# Patient Record
Sex: FEMALE | Race: WHITE | Employment: OTHER | URBAN - METROPOLITAN AREA
[De-identification: names, ages, dates, MRNs, and addresses within clinical notes are randomized per-mention and may not be internally consistent; named-entity substitution may affect disease eponyms.]

---

## 2021-06-09 ENCOUNTER — OFFICE VISIT (OUTPATIENT)
Dept: INTERNAL MEDICINE CLINIC | Age: 81
End: 2021-06-09
Payer: MEDICARE

## 2021-06-09 VITALS
SYSTOLIC BLOOD PRESSURE: 136 MMHG | OXYGEN SATURATION: 98 % | BODY MASS INDEX: 27.43 KG/M2 | HEIGHT: 68 IN | WEIGHT: 181 LBS | HEART RATE: 81 BPM | DIASTOLIC BLOOD PRESSURE: 60 MMHG | RESPIRATION RATE: 14 BRPM

## 2021-06-09 DIAGNOSIS — E78.2 MIXED HYPERLIPIDEMIA: ICD-10-CM

## 2021-06-09 DIAGNOSIS — R32 URINARY INCONTINENCE, UNSPECIFIED TYPE: Primary | ICD-10-CM

## 2021-06-09 DIAGNOSIS — Z78.0 POSTMENOPAUSAL: ICD-10-CM

## 2021-06-09 DIAGNOSIS — Z87.448 HX OF RENAL FAILURE: ICD-10-CM

## 2021-06-09 DIAGNOSIS — Z13.820 SCREENING FOR OSTEOPOROSIS: ICD-10-CM

## 2021-06-09 DIAGNOSIS — E66.3 OVER WEIGHT: ICD-10-CM

## 2021-06-09 DIAGNOSIS — R73.9 HIGH BLOOD SUGAR: ICD-10-CM

## 2021-06-09 DIAGNOSIS — I10 ESSENTIAL HYPERTENSION: ICD-10-CM

## 2021-06-09 PROBLEM — I73.9 PAD (PERIPHERAL ARTERY DISEASE) (HCC): Status: ACTIVE | Noted: 2019-02-26

## 2021-06-09 PROBLEM — N19 RENAL FAILURE: Status: ACTIVE | Noted: 2017-08-04

## 2021-06-09 LAB
BILIRUBIN URINE: NEGATIVE
BLOOD, URINE: ABNORMAL
CLARITY: CLEAR
COLOR: YELLOW
EPITHELIAL CELLS, UA: 2 /HPF (ref 0–5)
GLUCOSE URINE: NEGATIVE MG/DL
HYALINE CASTS: 1 /LPF (ref 0–8)
KETONES, URINE: NEGATIVE MG/DL
LEUKOCYTE ESTERASE, URINE: NEGATIVE
MICROSCOPIC EXAMINATION: YES
NITRITE, URINE: NEGATIVE
PH UA: 6 (ref 5–8)
PROTEIN UA: 30 MG/DL
RBC UA: 8 /HPF (ref 0–4)
SPECIFIC GRAVITY UA: 1.02 (ref 1–1.03)
URINE TYPE: ABNORMAL
UROBILINOGEN, URINE: 0.2 E.U./DL
WBC UA: 4 /HPF (ref 0–5)

## 2021-06-09 PROCEDURE — 1123F ACP DISCUSS/DSCN MKR DOCD: CPT | Performed by: INTERNAL MEDICINE

## 2021-06-09 PROCEDURE — G8417 CALC BMI ABV UP PARAM F/U: HCPCS | Performed by: INTERNAL MEDICINE

## 2021-06-09 PROCEDURE — 1036F TOBACCO NON-USER: CPT | Performed by: INTERNAL MEDICINE

## 2021-06-09 PROCEDURE — G8400 PT W/DXA NO RESULTS DOC: HCPCS | Performed by: INTERNAL MEDICINE

## 2021-06-09 PROCEDURE — 99204 OFFICE O/P NEW MOD 45 MIN: CPT | Performed by: INTERNAL MEDICINE

## 2021-06-09 PROCEDURE — 4040F PNEUMOC VAC/ADMIN/RCVD: CPT | Performed by: INTERNAL MEDICINE

## 2021-06-09 PROCEDURE — G8427 DOCREV CUR MEDS BY ELIG CLIN: HCPCS | Performed by: INTERNAL MEDICINE

## 2021-06-09 PROCEDURE — 0509F URINE INCON PLAN DOCD: CPT | Performed by: INTERNAL MEDICINE

## 2021-06-09 PROCEDURE — 1090F PRES/ABSN URINE INCON ASSESS: CPT | Performed by: INTERNAL MEDICINE

## 2021-06-09 RX ORDER — DONEPEZIL HYDROCHLORIDE 5 MG/1
5 TABLET, FILM COATED ORAL NIGHTLY
COMMUNITY
End: 2021-06-23 | Stop reason: SDUPTHER

## 2021-06-09 RX ORDER — MIRTAZAPINE 15 MG/1
15 TABLET, FILM COATED ORAL NIGHTLY
COMMUNITY
End: 2021-08-30 | Stop reason: SDUPTHER

## 2021-06-09 RX ORDER — ATORVASTATIN CALCIUM 40 MG/1
40 TABLET, FILM COATED ORAL DAILY
COMMUNITY
End: 2021-08-16 | Stop reason: SDUPTHER

## 2021-06-09 RX ORDER — ASPIRIN 81 MG/1
81 TABLET ORAL NIGHTLY
COMMUNITY

## 2021-06-09 ASSESSMENT — PATIENT HEALTH QUESTIONNAIRE - PHQ9
SUM OF ALL RESPONSES TO PHQ QUESTIONS 1-9: 0
SUM OF ALL RESPONSES TO PHQ QUESTIONS 1-9: 0
1. LITTLE INTEREST OR PLEASURE IN DOING THINGS: 0
SUM OF ALL RESPONSES TO PHQ QUESTIONS 1-9: 0
2. FEELING DOWN, DEPRESSED OR HOPELESS: 0
SUM OF ALL RESPONSES TO PHQ9 QUESTIONS 1 & 2: 0

## 2021-06-09 NOTE — PROGRESS NOTES
SULFAMETHOXAZOLE-TRIMETHOPRIM: headaches - Converted from [employer]       Past Medical History:   Diagnosis Date    Anxiety     Dementia ([address]     Hyperlipidemia     Hypertension        No past surgical history on file. Social History     Socioeconomic History    Marital status:      Spouse name: Not on file    Number of children: Not on file    Years of education: Not on file    Highest education level: Not on file   Occupational History    Not on file   Tobacco Use    Smoking status: Never Smoker    Smokeless tobacco: Never Used   Substance and Sexual Activity    Alcohol use: Never    Drug use: Never    Sexual activity: Not on file   Other Topics Concern    Not on file   Social History Narrative    Not on file     Social Determinants of Health     Financial Resource Strain:     Difficulty of Paying Living Expenses:    Food Insecurity:     Worried About Running Out of Food in the Last Year:     [address] in the Last Year:    Transportation Needs:     Lack of Transportation (Medical):  Lack of Transportation (Non-Medical):    Physical Activity:     Days of Exercise per Week:     Minutes of Exercise per Session:    Stress:     Feeling of Stress :    Social Connections:     Frequency of Communication with Friends and Family:     Frequency of Social Gatherings with Friends and Family:     Attends Jainism Services:     Active Member of Clubs or Organizations:     Attends Club or Organization Meetings:     Marital Status:    Intimate Partner Violence:     Fear of Current or Ex-Partner:     Emotionally Abused:     Physically Abused:     Sexually Abused:         No family history on file. OBJECTIVE:    Vitals:    06/09/21 0922 06/09/21 1005   BP: (!) 148/60 136/60   Pulse: 81    Resp: 14    SpO2: 98%    Weight: 181 lb (82.1 kg)    Height: 5' 8\" (1.727 m)      Body mass index is 27.52 kg/m².       Physical Exam  Constitutional:       General: She is not in acute 1 month    - Urinalysis with Microscopic; Future  - Culture, Urine; Future  - Urinalysis with Microscopic  - Culture, Urine    2. Over weight  BMI 27. Will screen for BONNER and DM  - Lipid Panel; Future  - Comprehensive Metabolic Panel; Future  - Hemoglobin A1C; Future    3. Hx of renal failure  Patient with documented renal failure in the past. Will check CMP to assess current kidney function. - Comprehensive Metabolic Panel; Future    4. High blood sugar  Patient with high blood sugar level in the past without documented hx of DM. Will check hba1c  - Hemoglobin A1C; Future    5. Screening for osteoporosis  Post menopausal. Has had dexa in the past about 10 years ago was normal.  - screening dexa      - DEXA BONE DENSITY 2 SITES; Future    6. Postmenopausal  Per #5 above  - DEXA BONE DENSITY 2 SITES; Future    7. Essential hypertension  BP wnl on recheck in the office today. Continue off medications. Low salt diet (<2g sodium per day)    8. Mixed hyperlipidemia  On statin therapy. Will check lipid panel. Continue statin    Return in about 4 weeks (around 7/7/2021).      The DO Napoleon

## 2021-06-10 LAB — URINE CULTURE, ROUTINE: NORMAL

## 2021-06-11 ENCOUNTER — TELEPHONE (OUTPATIENT)
Dept: INTERNAL MEDICINE CLINIC | Age: 81
End: 2021-06-11

## 2021-06-16 DIAGNOSIS — R31.29 OTHER MICROSCOPIC HEMATURIA: Primary | ICD-10-CM

## 2021-06-23 DIAGNOSIS — R31.29 OTHER MICROSCOPIC HEMATURIA: Primary | ICD-10-CM

## 2021-06-23 DIAGNOSIS — F03.90 DEMENTIA WITHOUT BEHAVIORAL DISTURBANCE, UNSPECIFIED DEMENTIA TYPE: ICD-10-CM

## 2021-06-23 RX ORDER — DONEPEZIL HYDROCHLORIDE 5 MG/1
5 TABLET, FILM COATED ORAL NIGHTLY
Qty: 30 TABLET | Refills: 2 | Status: SHIPPED | OUTPATIENT
Start: 2021-06-23 | End: 2021-08-30 | Stop reason: SDUPTHER

## 2021-08-09 ENCOUNTER — OFFICE VISIT (OUTPATIENT)
Dept: INTERNAL MEDICINE CLINIC | Age: 81
End: 2021-08-09
Payer: MEDICARE

## 2021-08-09 VITALS
HEIGHT: 67 IN | SYSTOLIC BLOOD PRESSURE: 132 MMHG | BODY MASS INDEX: 28.72 KG/M2 | WEIGHT: 183 LBS | OXYGEN SATURATION: 98 % | DIASTOLIC BLOOD PRESSURE: 78 MMHG | RESPIRATION RATE: 16 BRPM | HEART RATE: 76 BPM

## 2021-08-09 DIAGNOSIS — R32 URINARY INCONTINENCE, UNSPECIFIED TYPE: ICD-10-CM

## 2021-08-09 DIAGNOSIS — R73.03 PREDIABETES: ICD-10-CM

## 2021-08-09 DIAGNOSIS — R31.0 GROSS HEMATURIA: ICD-10-CM

## 2021-08-09 DIAGNOSIS — I10 ESSENTIAL HYPERTENSION: Primary | ICD-10-CM

## 2021-08-09 DIAGNOSIS — I73.9 PAD (PERIPHERAL ARTERY DISEASE) (HCC): ICD-10-CM

## 2021-08-09 PROCEDURE — 4040F PNEUMOC VAC/ADMIN/RCVD: CPT | Performed by: INTERNAL MEDICINE

## 2021-08-09 PROCEDURE — 1036F TOBACCO NON-USER: CPT | Performed by: INTERNAL MEDICINE

## 2021-08-09 PROCEDURE — 1090F PRES/ABSN URINE INCON ASSESS: CPT | Performed by: INTERNAL MEDICINE

## 2021-08-09 PROCEDURE — 1123F ACP DISCUSS/DSCN MKR DOCD: CPT | Performed by: INTERNAL MEDICINE

## 2021-08-09 PROCEDURE — G8400 PT W/DXA NO RESULTS DOC: HCPCS | Performed by: INTERNAL MEDICINE

## 2021-08-09 PROCEDURE — G8427 DOCREV CUR MEDS BY ELIG CLIN: HCPCS | Performed by: INTERNAL MEDICINE

## 2021-08-09 PROCEDURE — G8417 CALC BMI ABV UP PARAM F/U: HCPCS | Performed by: INTERNAL MEDICINE

## 2021-08-09 PROCEDURE — 99214 OFFICE O/P EST MOD 30 MIN: CPT | Performed by: INTERNAL MEDICINE

## 2021-08-09 PROCEDURE — 0509F URINE INCON PLAN DOCD: CPT | Performed by: INTERNAL MEDICINE

## 2021-08-09 SDOH — ECONOMIC STABILITY: INCOME INSECURITY: IN THE LAST 12 MONTHS, WAS THERE A TIME WHEN YOU WERE NOT ABLE TO PAY THE MORTGAGE OR RENT ON TIME?: NO

## 2021-08-09 SDOH — ECONOMIC STABILITY: FOOD INSECURITY: WITHIN THE PAST 12 MONTHS, YOU WORRIED THAT YOUR FOOD WOULD RUN OUT BEFORE YOU GOT MONEY TO BUY MORE.: NEVER TRUE

## 2021-08-09 SDOH — ECONOMIC STABILITY: FOOD INSECURITY: WITHIN THE PAST 12 MONTHS, THE FOOD YOU BOUGHT JUST DIDN'T LAST AND YOU DIDN'T HAVE MONEY TO GET MORE.: NEVER TRUE

## 2021-08-09 SDOH — ECONOMIC STABILITY: TRANSPORTATION INSECURITY
IN THE PAST 12 MONTHS, HAS THE LACK OF TRANSPORTATION KEPT YOU FROM MEDICAL APPOINTMENTS OR FROM GETTING MEDICATIONS?: NO

## 2021-08-09 SDOH — ECONOMIC STABILITY: HOUSING INSECURITY
IN THE LAST 12 MONTHS, WAS THERE A TIME WHEN YOU DID NOT HAVE A STEADY PLACE TO SLEEP OR SLEPT IN A SHELTER (INCLUDING NOW)?: NO

## 2021-08-09 SDOH — ECONOMIC STABILITY: TRANSPORTATION INSECURITY
IN THE PAST 12 MONTHS, HAS LACK OF TRANSPORTATION KEPT YOU FROM MEETINGS, WORK, OR FROM GETTING THINGS NEEDED FOR DAILY LIVING?: NO

## 2021-08-09 ASSESSMENT — SOCIAL DETERMINANTS OF HEALTH (SDOH): HOW HARD IS IT FOR YOU TO PAY FOR THE VERY BASICS LIKE FOOD, HOUSING, MEDICAL CARE, AND HEATING?: NOT HARD AT ALL

## 2021-08-09 NOTE — PROGRESS NOTES
Spenser Villalobos (:  1940) is a [de-identified] y.o. female,Established patient, here for evaluation of the following chief complaint(s):  1 Month Follow-Up      Vitals:    21 1453   BP: 132/78   Pulse: 76   Resp: 16   SpO2: 98%        ASSESSMENT/PLAN:  1. Essential hypertension  Blood pressure well controlled on no medications at this time continue to monitor. 2. PAD (peripheral artery disease) ([facility] [address]  Documented history of PAD patient has no pain in the legs with walking. 3. Prediabetes  Prediabetes noted on prior lab work we will plan to follow-up labs in 4 months  4. Urinary incontinence, unspecified type  Patient seen by urology and started on medications for incontinence. Patient is unsure of the name of this medication. Patient's daughter supposed to get me the name of this medication as soon as possible. 5. Gross hematuria  Evaluated by the urology group will obtain these records. Plan for CT to evaluate for mass within the bladder. We will continue to follow urology's recommendations. 6. Dementia  Continue Aricept    Return in about 4 months (around 2021). SUBJECTIVE/OBJECTIVE:  HPI    Patient is an 52-year-old female with past medical history of hypertension, PAD, microscopic hematuria, dementia and CKD who is here for follow-up for microscopic hematuria and other chronic problems. She did see the urology group -for microscopic hematuria. They did urine studies and lab work at the visit. CT scan also ordered- looking for mass in the bladder. This is not completed as of yet. Is also given medication for the incontinance. Patient feeling well overall but she does note some knee pain over the last 2 weeks ago. Did have a fall 1 month ago. She fell onto the right hip but did not fall onto the right knee where the pain is present. She has been taking ibuprofen for the discomfort with some relief. Pain does come and go and is not consistent.     Review of Systems ROS negative except for those noted in the HPI above. Vitals:    08/09/21 1453   BP: 132/78   Pulse: 76   Resp: 16   SpO2: 98%         Physical Exam  Constitutional:       General: She is not in acute distress. Appearance: Normal appearance. She is normal weight. She is not ill-appearing. HENT:      Head: Normocephalic and atraumatic. Right Ear: There is no impacted cerumen. Left Ear: There is no impacted cerumen. Eyes:      General: No scleral icterus. Extraocular Movements: Extraocular movements intact. Conjunctiva/sclera: Conjunctivae normal.   Cardiovascular:      Rate and Rhythm: Normal rate and regular rhythm. Pulses: Normal pulses. Heart sounds: No murmur heard. No friction rub. No gallop. Pulmonary:      Effort: Pulmonary effort is normal. No respiratory distress. Breath sounds: Normal breath sounds. No wheezing, rhonchi or rales. Abdominal:      General: Bowel sounds are normal. There is no distension. Palpations: Abdomen is soft. Tenderness: There is no abdominal tenderness. There is no guarding or rebound. Musculoskeletal:         General: No swelling or deformity. Comments: Crepitus in the right knee with flexion and extension     Skin:     General: Skin is warm. Findings: No erythema or rash. Neurological:      General: No focal deficit present. Mental Status: She is alert and oriented to person, place, and time. Psychiatric:         Mood and Affect: Mood normal.         Behavior: Behavior normal.           An electronic signature was used to authenticate this note.     --Christa Cobos, DO

## 2021-08-10 PROBLEM — R73.03 PREDIABETES: Status: ACTIVE | Noted: 2021-06-09

## 2021-08-16 ENCOUNTER — PATIENT MESSAGE (OUTPATIENT)
Dept: INTERNAL MEDICINE CLINIC | Age: 81
End: 2021-08-16

## 2021-08-16 RX ORDER — ATORVASTATIN CALCIUM 40 MG/1
40 TABLET, FILM COATED ORAL DAILY
Qty: 30 TABLET | Refills: 3 | Status: SHIPPED | OUTPATIENT
Start: 2021-08-16 | End: 2022-02-16 | Stop reason: SDUPTHER

## 2021-08-16 NOTE — TELEPHONE ENCOUNTER
From: Dejah Gill  To: Castillo Fernandez DO  Sent: 2021 12:28 PM EDT  Subject: Prescription Question    My mom Rhoda Bey () 1940 needs her Atorvastatin sent into the [facility] [place] on [address]. Please call if you have any questions or issues with sending in the medication to the pharmacy.  Thank You Fallon Babcock [phone]

## 2021-08-30 ENCOUNTER — OFFICE VISIT (OUTPATIENT)
Dept: INTERNAL MEDICINE CLINIC | Age: 81
End: 2021-08-30
Payer: MEDICARE

## 2021-08-30 ENCOUNTER — TELEPHONE (OUTPATIENT)
Dept: INTERNAL MEDICINE CLINIC | Age: 81
End: 2021-08-30

## 2021-08-30 ENCOUNTER — HOSPITAL ENCOUNTER (OUTPATIENT)
Dept: NON INVASIVE DIAGNOSTICS | Age: 81
Discharge: HOME | End: 2021-08-30
Payer: MEDICARE

## 2021-08-30 VITALS
RESPIRATION RATE: 16 BRPM | SYSTOLIC BLOOD PRESSURE: 140 MMHG | HEIGHT: 67 IN | OXYGEN SATURATION: 98 % | WEIGHT: 184 LBS | DIASTOLIC BLOOD PRESSURE: 70 MMHG | BODY MASS INDEX: 28.88 KG/M2 | HEART RATE: 80 BPM

## 2021-08-30 DIAGNOSIS — R94.31 ABNORMAL EKG: ICD-10-CM

## 2021-08-30 DIAGNOSIS — I10 ESSENTIAL HYPERTENSION: ICD-10-CM

## 2021-08-30 DIAGNOSIS — R31.29 MICROSCOPIC HEMATURIA: ICD-10-CM

## 2021-08-30 DIAGNOSIS — F03.90 DEMENTIA WITHOUT BEHAVIORAL DISTURBANCE, UNSPECIFIED DEMENTIA TYPE: ICD-10-CM

## 2021-08-30 DIAGNOSIS — Z01.818 PREOP EXAMINATION: Primary | ICD-10-CM

## 2021-08-30 DIAGNOSIS — Z01.818 PREOP EXAMINATION: ICD-10-CM

## 2021-08-30 LAB
LV EF: 58 %
LVEF MODALITY: NORMAL

## 2021-08-30 PROCEDURE — 93306 TTE W/DOPPLER COMPLETE: CPT

## 2021-08-30 PROCEDURE — 1123F ACP DISCUSS/DSCN MKR DOCD: CPT | Performed by: INTERNAL MEDICINE

## 2021-08-30 PROCEDURE — G8417 CALC BMI ABV UP PARAM F/U: HCPCS | Performed by: INTERNAL MEDICINE

## 2021-08-30 PROCEDURE — G8400 PT W/DXA NO RESULTS DOC: HCPCS | Performed by: INTERNAL MEDICINE

## 2021-08-30 PROCEDURE — 1090F PRES/ABSN URINE INCON ASSESS: CPT | Performed by: INTERNAL MEDICINE

## 2021-08-30 PROCEDURE — 93000 ELECTROCARDIOGRAM COMPLETE: CPT | Performed by: INTERNAL MEDICINE

## 2021-08-30 PROCEDURE — 99214 OFFICE O/P EST MOD 30 MIN: CPT | Performed by: INTERNAL MEDICINE

## 2021-08-30 PROCEDURE — 1036F TOBACCO NON-USER: CPT | Performed by: INTERNAL MEDICINE

## 2021-08-30 PROCEDURE — 4040F PNEUMOC VAC/ADMIN/RCVD: CPT | Performed by: INTERNAL MEDICINE

## 2021-08-30 PROCEDURE — G8427 DOCREV CUR MEDS BY ELIG CLIN: HCPCS | Performed by: INTERNAL MEDICINE

## 2021-08-30 RX ORDER — DONEPEZIL HYDROCHLORIDE 5 MG/1
5 TABLET, FILM COATED ORAL NIGHTLY
Qty: 30 TABLET | Refills: 2 | Status: SHIPPED | OUTPATIENT
Start: 2021-08-30 | End: 2021-10-11

## 2021-08-30 RX ORDER — MIRTAZAPINE 15 MG/1
15 TABLET, FILM COATED ORAL NIGHTLY
Qty: 30 TABLET | Refills: 3 | Status: SHIPPED | OUTPATIENT
Start: 2021-08-30 | End: 2021-10-11

## 2021-08-30 NOTE — PROGRESS NOTES
[address] [facility]  Preoperative Evaluation  Tawnya Garrett DO  Internal Medicine      Leilanifouzia Jack  YOB: 1940    Date of Service:  8/30/2021    Chief Complaint   Patient presents with    Pre-op Exam     [facility] [phone]       HPI:    This patient presents tothe office today for a preoperative evaluation at the request of surgeon, Dr. Daina Cleaning, who plans on performing cysto on September 2 at the urology group surgery center in [facility]. The current problem began years ago, and symptoms have been stable. Planned anesthesia: General   Known anesthesia problems: unknown  Bleeding risk: No recent or remote history of abnormal bleeding  Personal or FH of DVT/PE: No   Recent steroid use: no  Exercise tolerance before surgery at least 4 METS (Can walk up a flight of steps or a hill or walk on level ground at 3 to 4 mph): Yes   Patient objection to receiving blood products: No    Patient is feeling very well. She denies any chest pain or shortness of breath when walking with her walker. She does go long distances with walking but walks slowly. Patient has had blood in the urine which is reason for procedure . Patient Active Problem List   Diagnosis    Anxiety disorder    HTN (hypertension)    Insomnia    Low back pain    Mixed hyperlipidemia    PAD (peripheral artery disease) [address] [address]    Renal failure    Urinary incontinence    Over weight    Hx of renal failure    Prediabetes    Microscopic hematuria       Review of Systems ROS negative except for those noted in the HPI above.        Allergies   Allergen Reactions    Clarithromycin      CLARITHROMYCIN: headache - Converted from [employer]    Sulfamethoxazole-Trimethoprim      SULFAMETHOXAZOLE-TRIMETHOPRIM: headaches - Converted from [employer]     Outpatient Medications Marked as Taking for the 8/30/21 encounter (Office Visit) with Gabriella Delgadillo DO   Medication Sig Dispense Refill    mirtazapine (Maggie Moraes) 15 MG tablet Take 1 tablet by mouth nightly 30 tablet 3    donepezil (ARICEPT) 5 MG tablet Take 1 tablet by mouth nightly 30 tablet 2    atorvastatin (LIPITOR) 40 MG tablet Take 1 tablet by mouth daily 30 tablet 3    aspirin 81 MG EC tablet Take 81 mg by mouth daily         Past Medical History:   Diagnosis Date    Anxiety     Dementia ([address]     Hyperlipidemia     Hypertension      No past surgical history on file. No family history on file. Social History     Socioeconomic History    Marital status:      Spouse name: Not on file    Number of children: Not on file    Years of education: Not on file    Highest education level: Not on file   Occupational History    Not on file   Tobacco Use    Smoking status: Never Smoker    Smokeless tobacco: Never Used   Substance and Sexual Activity    Alcohol use: Never    Drug use: Never    Sexual activity: Not on file   Other Topics Concern    Not on file   Social History Narrative    Not on file     Social Determinants of Health     Financial Resource Strain: Low Risk     Difficulty of Paying Living Expenses: Not hard at all   Food Insecurity: No Food Insecurity    Worried About Running Out of Food in the Last Year: Never true    [employer] of Food in the Last Year: Never true   Transportation Needs: No Transportation Needs    Lack of Transportation (Medical): No    Lack of Transportation (Non-Medical):  No   Physical Activity:     Days of Exercise per Week:     Minutes of Exercise per Session:    Stress:     Feeling of Stress :    Social Connections:     Frequency of Communication with Friends and Family:     Frequency of Social Gatherings with Friends and Family:     Attends Sikh Services:     Active Member of Clubs or Organizations:     Attends Club or Organization Meetings:     Marital Status:    Intimate Partner Violence:     Fear of Current or Ex-Partner:     Emotionally Abused:     Physically Abused:     Sexually Abused: Objective:    Vitals:    08/30/21 1130   BP: (!) 140/70   Pulse: 80   Resp: 16   SpO2: 98%   Weight: 184 lb (83.5 kg)   Height: 5' 7\" (1.702 m)       Wt Readings from Last 2 Encounters:   08/30/21 184 lb (83.5 kg)   08/09/21 183 lb (83 kg)     BP Readings from Last 3 Encounters:   08/30/21 (!) 140/70   08/09/21 132/78   06/09/21 136/60      Physical Exam  Constitutional:       General: She is not in acute distress. Appearance: Normal appearance. She is not ill-appearing. HENT:      Head: Normocephalic and atraumatic. Right Ear: External ear normal.      Left Ear: External ear normal.   Eyes:      General: No scleral icterus. Extraocular Movements: Extraocular movements intact. Conjunctiva/sclera: Conjunctivae normal.   Cardiovascular:      Rate and Rhythm: Normal rate and regular rhythm. Pulses: Normal pulses. Heart sounds: No murmur heard. No friction rub. No gallop. Pulmonary:      Effort: Pulmonary effort is normal. No respiratory distress. Breath sounds: Normal breath sounds. No wheezing, rhonchi or rales. Abdominal:      General: Bowel sounds are normal. There is no distension. Palpations: Abdomen is soft. There is no mass. Tenderness: There is no abdominal tenderness. There is no guarding or rebound. Musculoskeletal:         General: No swelling. Right lower leg: No edema. Left lower leg: No edema. Skin:     Findings: No erythema or rash. Neurological:      General: No focal deficit present. Mental Status: She is alert and oriented to person, place, and time.    Psychiatric:         Mood and Affect: Mood normal.         Behavior: Behavior normal.          Lab Results   Component Value Date    HDL 30 (L) 06/09/2021    LDLCALC 72 06/09/2021     Lab Results   Component Value Date    LABA1C 6.0 06/09/2021     Lab Results   Component Value Date     (L) 06/09/2021    K 4.6 06/09/2021    BUN 22 (H) 06/09/2021    CREATININE 1.0 06/09/2021    LABGLOM 53 (A) 06/09/2021    GFRAA >60 06/09/2021    CALCIUM 9.3 06/09/2021     Lab Results   Component Value Date    ALT 13 06/09/2021    AST 25 06/09/2021     No results found for: HGB  No results found for: TSHREFLEX, TSH          Assessment/Plan:     1. Preop examination  Patient low to intermediate risk for low risk procedure. Okay to proceed with surgery. EKG did show abnormalities including possible old anterior septal infarct? Although patient did have EKG in March 2021 I am not able to see this because it is in care everywhere from [place]. However, there is a read within care everywhere showing similar findings. Our EKG did also have low voltage, likely secondary to lead placement.   -Obtained echo to evaluate heart structure given these findings which came back normal.  Okay to proceed with surgery.  -Since patient does not have any chest pain on exertion or at rest will hold on stress test  - EKG 12 lead; Future  - EKG 12 lead  - ECHO Complete 2D W Doppler W Color; Future    2. Dementia without behavioral disturbance, unspecified dementia type ([facility] [address]  Continue current medications refill sent    3. Abnormal EKG  See #1 above  - ECHO Complete 2D W Doppler W Color; Future    4. Essential hypertension  Moderately well controlled. Blood pressure slightly up today with systolic at 580    5. Microscopic hematuria  Patient with microscopic hematuria. Plans for cystoscopy for further evaluation. Emergent procedure No  Active Cardiac Condition (decompensated HF, Arrhythmia, MI <3 weeks, severe valve disease):  No   Risk Level of Procedure Low Risk (endoscopy, superficial skin, breast, ambulatory, or cataract, etc.)  Revised Cardiac Risk Index Risk factors: History of ischemic heart disease (likely per EKG however unknow)     Patient is low - intermediate risk for major cardiac complications during a low risk procedure and may continue as planned.     Preoperative workup as follows: echocardiography to exclude impaired ventricular function, ECG  Change in medication regimen before surgery: Hold all medications on morning of surgery  Prophylaxis for cardiac events with perioperative beta-blockers:Not indicated    Deep vein thrombosis prophylaxis: regimen to be chosen by surgical team    Further recommendations requested from consultants: No        --Coreen Gutiérrez DO on 8/31/2021 at 11:45 AM

## 2021-08-30 NOTE — TELEPHONE ENCOUNTER
Please fax patient's Echo and H&P to:     [facility]  Fax #:  [phone]  Atten:  Peggy James normal...

## 2021-08-31 PROBLEM — R31.29 MICROSCOPIC HEMATURIA: Status: ACTIVE | Noted: 2021-08-31

## 2021-10-06 ENCOUNTER — TELEPHONE (OUTPATIENT)
Dept: INTERNAL MEDICINE CLINIC | Age: 81
End: 2021-10-06

## 2021-10-07 ENCOUNTER — OFFICE VISIT (OUTPATIENT)
Dept: INTERNAL MEDICINE CLINIC | Age: 81
End: 2021-10-07
Payer: MEDICARE

## 2021-10-07 VITALS
DIASTOLIC BLOOD PRESSURE: 66 MMHG | SYSTOLIC BLOOD PRESSURE: 138 MMHG | RESPIRATION RATE: 16 BRPM | TEMPERATURE: 98.1 F | BODY MASS INDEX: 28.66 KG/M2 | HEART RATE: 80 BPM | WEIGHT: 183 LBS

## 2021-10-07 DIAGNOSIS — R39.9 UTI SYMPTOMS: Primary | ICD-10-CM

## 2021-10-07 LAB
BILIRUBIN, POC: NEGATIVE
BLOOD URINE, POC: NORMAL
CLARITY, POC: CLEAR
COLOR, POC: YELLOW
GLUCOSE URINE, POC: NEGATIVE
KETONES, POC: NEGATIVE
LEUKOCYTE EST, POC: NORMAL
NITRITE, POC: NEGATIVE
PH, POC: 6
PROTEIN, POC: NORMAL
SPECIFIC GRAVITY, POC: 1.03
UROBILINOGEN, POC: NORMAL

## 2021-10-07 PROCEDURE — G8400 PT W/DXA NO RESULTS DOC: HCPCS | Performed by: INTERNAL MEDICINE

## 2021-10-07 PROCEDURE — 4040F PNEUMOC VAC/ADMIN/RCVD: CPT | Performed by: INTERNAL MEDICINE

## 2021-10-07 PROCEDURE — G8427 DOCREV CUR MEDS BY ELIG CLIN: HCPCS | Performed by: INTERNAL MEDICINE

## 2021-10-07 PROCEDURE — 1036F TOBACCO NON-USER: CPT | Performed by: INTERNAL MEDICINE

## 2021-10-07 PROCEDURE — 99214 OFFICE O/P EST MOD 30 MIN: CPT | Performed by: INTERNAL MEDICINE

## 2021-10-07 PROCEDURE — 81002 URINALYSIS NONAUTO W/O SCOPE: CPT | Performed by: INTERNAL MEDICINE

## 2021-10-07 PROCEDURE — 1090F PRES/ABSN URINE INCON ASSESS: CPT | Performed by: INTERNAL MEDICINE

## 2021-10-07 PROCEDURE — 1123F ACP DISCUSS/DSCN MKR DOCD: CPT | Performed by: INTERNAL MEDICINE

## 2021-10-07 PROCEDURE — G8417 CALC BMI ABV UP PARAM F/U: HCPCS | Performed by: INTERNAL MEDICINE

## 2021-10-07 PROCEDURE — G8484 FLU IMMUNIZE NO ADMIN: HCPCS | Performed by: INTERNAL MEDICINE

## 2021-10-07 RX ORDER — VIBEGRON 75 MG/1
75 TABLET, FILM COATED ORAL NIGHTLY
COMMUNITY
End: 2021-12-31

## 2021-10-07 RX ORDER — NITROFURANTOIN 25; 75 MG/1; MG/1
100 CAPSULE ORAL 2 TIMES DAILY
Qty: 10 CAPSULE | Refills: 0 | Status: SHIPPED | OUTPATIENT
Start: 2021-10-07 | End: 2021-10-12

## 2021-10-07 NOTE — PROGRESS NOTES
Piyush Marshall (:  1940) is a [de-identified] y.o. female,Established patient, here for evaluation of the following chief complaint(s):  Anorexia (Poor appetite and fatigue since Monday.  )      Vitals:    10/07/21 1520   BP: 138/66   Pulse: 80   Resp: 16   Temp: 98.1 °F (36.7 °C)        ASSESSMENT/PLAN:  1. UTI symptoms  Patient with symptoms of abdominal discomfort and decreased appetite for the last 3 to 4 days. This is also associated with some fatigue. Patient denies burning with urination.  -Urine dip in the office is positive for leukocytes and blood. Patient does have microscopic hematuria chronically however there is a little more blood than patient's baseline.  -Obtain formal UA and culture  -We will start Macrobid and check culture for sensitivity  -Patient to call with any worsening of symptoms including fevers or chills  -     POCT Urinalysis no Micro  -     Culture, Urine; Future  -     Urinalysis with Microscopic; Future      Patient to keep appointment for December    SUBJECTIVE/OBJECTIVE:  HPI    Patient is an 81-year-old female with past medical history of CKD, hypertension, PAD, hyperlipidemia and microscopic hematuria who presents secondary to abdominal discomfort and decreased appetite over the last few days. Monday night into Tuesday as well as  stomach ache. Not eating well. Patient's daughter notes that when patient has the symptoms that she typically ends up having a UTI. Patient had been slightly more tired and less active in the days prior as well. Patient does note that she is feeling a little bit better today. Review of Systems ROS negative except for those noted in the HPI above. Vitals:    10/07/21 1520   BP: 138/66   Pulse: 80   Resp: 16   Temp: 98.1 °F (36.7 °C)     Physical Exam  Constitutional:       General: She is not in acute distress. Appearance: Normal appearance. She is not ill-appearing. HENT:      Head: Normocephalic and atraumatic.       Right [Curative] : Goals of care discussed with patient: Curative Ear: External ear normal.      Left Ear: External ear normal.      Nose: No rhinorrhea. Eyes:      General: No scleral icterus. Extraocular Movements: Extraocular movements intact. Conjunctiva/sclera: Conjunctivae normal.   Cardiovascular:      Rate and Rhythm: Normal rate and regular rhythm. Pulses: Normal pulses. Heart sounds: No murmur heard. No friction rub. No gallop. Pulmonary:      Effort: Pulmonary effort is normal. No respiratory distress. Breath sounds: Normal breath sounds. No wheezing, rhonchi or rales. Abdominal:      General: Bowel sounds are normal. There is no distension. Palpations: Abdomen is soft. There is no mass. Tenderness: There is no abdominal tenderness. There is no guarding or rebound. Musculoskeletal:      Cervical back: Normal range of motion. No muscular tenderness. Skin:     General: Skin is warm. Findings: No erythema or rash. Neurological:      General: No focal deficit present. Mental Status: She is alert and oriented to person, place, and time. Psychiatric:         Mood and Affect: Mood normal.         Behavior: Behavior normal.           An electronic signature was used to authenticate this note.     --Dharmesh Pettit DO [Palliative Care Plan] : not applicable at this time

## 2021-10-08 ENCOUNTER — TELEPHONE (OUTPATIENT)
Dept: INTERNAL MEDICINE CLINIC | Age: 81
End: 2021-10-08

## 2021-10-08 LAB
BILIRUBIN URINE: NEGATIVE
BLOOD, URINE: ABNORMAL
CLARITY: CLEAR
COLOR: YELLOW
EPITHELIAL CELLS, UA: 1 /HPF (ref 0–5)
GLUCOSE URINE: NEGATIVE MG/DL
HYALINE CASTS: 4 /LPF (ref 0–8)
KETONES, URINE: NEGATIVE MG/DL
LEUKOCYTE ESTERASE, URINE: ABNORMAL
MICROSCOPIC EXAMINATION: YES
NITRITE, URINE: NEGATIVE
PH UA: 6 (ref 5–8)
PROTEIN UA: ABNORMAL MG/DL
RBC UA: 1 /HPF (ref 0–4)
SPECIFIC GRAVITY UA: 1.03 (ref 1–1.03)
URINE CULTURE, ROUTINE: NORMAL
URINE TYPE: ABNORMAL
UROBILINOGEN, URINE: 1 E.U./DL
WBC UA: 16 /HPF (ref 0–5)

## 2021-10-08 NOTE — TELEPHONE ENCOUNTER
[facility] is on back order, and they want to know if they can switch to Macrodantin, which they do have stock. Please contact pharmacy at phone # provided.

## 2021-10-11 NOTE — TELEPHONE ENCOUNTER
Patient was able to get medication over the weekend. Did talk with patients daughter. Urine culture came back negative. Discussed that they could discontinue abx 2/2 this.

## 2021-10-22 ENCOUNTER — HOSPITAL ENCOUNTER (EMERGENCY)
Age: 81
Discharge: HOME | End: 2021-10-23
Attending: EMERGENCY MEDICINE
Payer: MEDICARE

## 2021-10-22 ENCOUNTER — APPOINTMENT (OUTPATIENT)
Dept: CT IMAGING | Age: 81
End: 2021-10-22
Payer: MEDICARE

## 2021-10-22 ENCOUNTER — APPOINTMENT (OUTPATIENT)
Dept: GENERAL RADIOLOGY | Age: 81
End: 2021-10-22
Payer: MEDICARE

## 2021-10-22 VITALS
RESPIRATION RATE: 16 BRPM | DIASTOLIC BLOOD PRESSURE: 67 MMHG | HEART RATE: 62 BPM | HEIGHT: 60 IN | SYSTOLIC BLOOD PRESSURE: 163 MMHG | WEIGHT: 184.6 LBS | OXYGEN SATURATION: 96 % | TEMPERATURE: 98.2 F | BODY MASS INDEX: 36.24 KG/M2

## 2021-10-22 DIAGNOSIS — R11.2 NAUSEA AND VOMITING, INTRACTABILITY OF VOMITING NOT SPECIFIED, UNSPECIFIED VOMITING TYPE: Primary | ICD-10-CM

## 2021-10-22 LAB
A/G RATIO: 1 (ref 1.1–2.2)
ALBUMIN SERPL-MCNC: 3.3 G/DL (ref 3.4–5)
ALP BLD-CCNC: 158 U/L (ref 40–129)
ALT SERPL-CCNC: 14 U/L (ref 10–40)
ANION GAP SERPL CALCULATED.3IONS-SCNC: 14 MMOL/L (ref 3–16)
AST SERPL-CCNC: 20 U/L (ref 15–37)
BACTERIA: ABNORMAL /HPF
BASOPHILS ABSOLUTE: 0 K/UL (ref 0–0.2)
BASOPHILS RELATIVE PERCENT: 0.2 %
BILIRUB SERPL-MCNC: 0.9 MG/DL (ref 0–1)
BILIRUBIN URINE: NEGATIVE
BLOOD, URINE: ABNORMAL
BUN BLDV-MCNC: 14 MG/DL (ref 7–20)
CALCIUM SERPL-MCNC: 8.3 MG/DL (ref 8.3–10.6)
CHLORIDE BLD-SCNC: 103 MMOL/L (ref 99–110)
CLARITY: CLEAR
CO2: 22 MMOL/L (ref 21–32)
COLOR: YELLOW
CREAT SERPL-MCNC: 0.7 MG/DL (ref 0.6–1.2)
EKG ATRIAL RATE: 69 BPM
EKG DIAGNOSIS: NORMAL
EKG P AXIS: 56 DEGREES
EKG P-R INTERVAL: 186 MS
EKG Q-T INTERVAL: 426 MS
EKG QRS DURATION: 74 MS
EKG QTC CALCULATION (BAZETT): 456 MS
EKG R AXIS: 70 DEGREES
EKG T AXIS: 77 DEGREES
EKG VENTRICULAR RATE: 69 BPM
EOSINOPHILS ABSOLUTE: 0 K/UL (ref 0–0.6)
EOSINOPHILS RELATIVE PERCENT: 0.2 %
EPITHELIAL CELLS, UA: ABNORMAL /HPF (ref 0–5)
GFR AFRICAN AMERICAN: >60
GFR NON-AFRICAN AMERICAN: >60
GLOBULIN: 3.3 G/DL
GLUCOSE BLD-MCNC: 145 MG/DL (ref 70–99)
GLUCOSE URINE: NEGATIVE MG/DL
HCT VFR BLD CALC: 41 % (ref 36–48)
HEMOGLOBIN: 14 G/DL (ref 12–16)
KETONES, URINE: NEGATIVE MG/DL
LACTIC ACID, SEPSIS: 2.3 MMOL/L (ref 0.4–1.9)
LEUKOCYTE ESTERASE, URINE: NEGATIVE
LIPASE: 35 U/L (ref 13–60)
LYMPHOCYTES ABSOLUTE: 0.9 K/UL (ref 1–5.1)
LYMPHOCYTES RELATIVE PERCENT: 11 %
MCH RBC QN AUTO: 29.8 PG (ref 26–34)
MCHC RBC AUTO-ENTMCNC: 34.2 G/DL (ref 31–36)
MCV RBC AUTO: 87 FL (ref 80–100)
MICROSCOPIC EXAMINATION: YES
MONOCYTES ABSOLUTE: 0.6 K/UL (ref 0–1.3)
MONOCYTES RELATIVE PERCENT: 7.6 %
NEUTROPHILS ABSOLUTE: 6.8 K/UL (ref 1.7–7.7)
NEUTROPHILS RELATIVE PERCENT: 81 %
NITRITE, URINE: NEGATIVE
PDW BLD-RTO: 14.8 % (ref 12.4–15.4)
PH UA: 7 (ref 5–8)
PLATELET # BLD: 266 K/UL (ref 135–450)
PMV BLD AUTO: 8 FL (ref 5–10.5)
POTASSIUM REFLEX MAGNESIUM: 3.8 MMOL/L (ref 3.5–5.1)
PRO-BNP: 209 PG/ML (ref 0–449)
PROTEIN UA: NEGATIVE MG/DL
RBC # BLD: 4.71 M/UL (ref 4–5.2)
RBC UA: ABNORMAL /HPF (ref 0–4)
SODIUM BLD-SCNC: 139 MMOL/L (ref 136–145)
SPECIFIC GRAVITY UA: 1.02 (ref 1–1.03)
TOTAL PROTEIN: 6.6 G/DL (ref 6.4–8.2)
TROPONIN: <0.01 NG/ML
URINE TYPE: ABNORMAL
UROBILINOGEN, URINE: 0.2 E.U./DL
WBC # BLD: 8.4 K/UL (ref 4–11)
WBC UA: ABNORMAL /HPF (ref 0–5)

## 2021-10-22 PROCEDURE — 96360 HYDRATION IV INFUSION INIT: CPT

## 2021-10-22 PROCEDURE — 84484 ASSAY OF TROPONIN QUANT: CPT

## 2021-10-22 PROCEDURE — 83690 ASSAY OF LIPASE: CPT

## 2021-10-22 PROCEDURE — 6360000004 HC RX CONTRAST MEDICATION: Performed by: STUDENT IN AN ORGANIZED HEALTH CARE EDUCATION/TRAINING PROGRAM

## 2021-10-22 PROCEDURE — 83880 ASSAY OF NATRIURETIC PEPTIDE: CPT

## 2021-10-22 PROCEDURE — 36415 COLL VENOUS BLD VENIPUNCTURE: CPT

## 2021-10-22 PROCEDURE — 80053 COMPREHEN METABOLIC PANEL: CPT

## 2021-10-22 PROCEDURE — 93005 ELECTROCARDIOGRAM TRACING: CPT | Performed by: STUDENT IN AN ORGANIZED HEALTH CARE EDUCATION/TRAINING PROGRAM

## 2021-10-22 PROCEDURE — 81001 URINALYSIS AUTO W/SCOPE: CPT

## 2021-10-22 PROCEDURE — 99284 EMERGENCY DEPT VISIT MOD MDM: CPT

## 2021-10-22 PROCEDURE — 96361 HYDRATE IV INFUSION ADD-ON: CPT

## 2021-10-22 PROCEDURE — U0005 INFEC AGEN DETEC AMPLI PROBE: HCPCS

## 2021-10-22 PROCEDURE — 71045 X-RAY EXAM CHEST 1 VIEW: CPT

## 2021-10-22 PROCEDURE — 70496 CT ANGIOGRAPHY HEAD: CPT

## 2021-10-22 PROCEDURE — 85025 COMPLETE CBC W/AUTO DIFF WBC: CPT

## 2021-10-22 PROCEDURE — 6370000000 HC RX 637 (ALT 250 FOR IP): Performed by: PHYSICIAN ASSISTANT

## 2021-10-22 PROCEDURE — 83605 ASSAY OF LACTIC ACID: CPT

## 2021-10-22 PROCEDURE — U0003 INFECTIOUS AGENT DETECTION BY NUCLEIC ACID (DNA OR RNA); SEVERE ACUTE RESPIRATORY SYNDROME CORONAVIRUS 2 (SARS-COV-2) (CORONAVIRUS DISEASE [COVID-19]), AMPLIFIED PROBE TECHNIQUE, MAKING USE OF HIGH THROUGHPUT TECHNOLOGIES AS DESCRIBED BY CMS-2020-01-R: HCPCS

## 2021-10-22 PROCEDURE — 70498 CT ANGIOGRAPHY NECK: CPT

## 2021-10-22 PROCEDURE — 74177 CT ABD & PELVIS W/CONTRAST: CPT

## 2021-10-22 PROCEDURE — 2580000003 HC RX 258: Performed by: STUDENT IN AN ORGANIZED HEALTH CARE EDUCATION/TRAINING PROGRAM

## 2021-10-22 PROCEDURE — 70450 CT HEAD/BRAIN W/O DYE: CPT

## 2021-10-22 RX ORDER — SODIUM CHLORIDE, SODIUM LACTATE, POTASSIUM CHLORIDE, CALCIUM CHLORIDE 600; 310; 30; 20 MG/100ML; MG/100ML; MG/100ML; MG/100ML
1000 INJECTION, SOLUTION INTRAVENOUS ONCE
Status: COMPLETED | OUTPATIENT
Start: 2021-10-22 | End: 2021-10-23

## 2021-10-22 RX ORDER — ONDANSETRON 4 MG/1
4 TABLET, ORALLY DISINTEGRATING ORAL ONCE
Status: COMPLETED | OUTPATIENT
Start: 2021-10-22 | End: 2021-10-22

## 2021-10-22 RX ORDER — ONDANSETRON 4 MG/1
4 TABLET, ORALLY DISINTEGRATING ORAL EVERY 8 HOURS PRN
Qty: 30 TABLET | Refills: 0 | Status: SHIPPED | OUTPATIENT
Start: 2021-10-22 | End: 2021-11-17 | Stop reason: ALTCHOICE

## 2021-10-22 RX ORDER — SODIUM CHLORIDE, SODIUM LACTATE, POTASSIUM CHLORIDE, CALCIUM CHLORIDE 600; 310; 30; 20 MG/100ML; MG/100ML; MG/100ML; MG/100ML
1000 INJECTION, SOLUTION INTRAVENOUS ONCE
Status: DISCONTINUED | OUTPATIENT
Start: 2021-10-22 | End: 2021-10-22

## 2021-10-22 RX ADMIN — ONDANSETRON 4 MG: 4 TABLET, ORALLY DISINTEGRATING ORAL at 21:29

## 2021-10-22 RX ADMIN — ONDANSETRON 4 MG: 4 TABLET, ORALLY DISINTEGRATING ORAL at 23:11

## 2021-10-22 RX ADMIN — SODIUM CHLORIDE, SODIUM LACTATE, POTASSIUM CHLORIDE, AND CALCIUM CHLORIDE 1000 ML: .6; .31; .03; .02 INJECTION, SOLUTION INTRAVENOUS at 17:56

## 2021-10-22 RX ADMIN — IOPAMIDOL 80 ML: 755 INJECTION, SOLUTION INTRAVENOUS at 18:39

## 2021-10-22 ASSESSMENT — PAIN DESCRIPTION - PAIN TYPE: TYPE: ACUTE PAIN

## 2021-10-22 NOTE — ED PROVIDER NOTES
[address] [facility]          EM RESIDENT NOTE       Date of evaluation: 10/22/2021    Chief Complaint     Emesis (vomiting for 1 day, being treated for a uti for last 2 wks. feeling tired. )      History of Present Illness     Malina Triplett is a [de-identified] y.o. female with past medical history of hypertension and dementia presenting to the emergency department for evaluation of nausea and vomiting and headache over the last 12 hours. The patient was brought in by EMS who gave her 500 cc fluid bolus as well as Zofran for nausea and vomiting. The patient lives at home with her daughter who is the main historian as the patient is disoriented and unable to provide history. The patient's daughter reports that over the last 24 hours she has had a few episodes of nonbloody nonbilious emesis and has been coughing up more phlegm than normal.  Patient also has been reporting a headache in coordination with her current symptoms. She has had no fevers and was recently treated for urinary tract infection which was culture negative with a course of Macrobid. The patient has had decreased oral intake over the last 24 hours or so of both food and fluids. She has no known sick contacts, no URI symptoms, and no diarrhea. No reported  symptoms. Patient was vaccinated for Covid back in March. Review of Systems     Review of Systems   Unable to perform ROS: Dementia       Past Medical, Surgical, Family, and Social History     She has a past medical history of Anxiety, Dementia ([address] Hyperlipidemia, and Hypertension. She has no past surgical history on file. Her family history is not on file. She reports that she has never smoked. She has never used smokeless tobacco. She reports that she does not drink alcohol and does not use drugs.     Medications     Previous Medications    ASPIRIN 81 MG EC TABLET    Take 81 mg by mouth daily    ATORVASTATIN (LIPITOR) 40 MG TABLET    Take 1 tablet by mouth daily    DONEPEZIL (ARICEPT) 5 MG TABLET    TAKE 1 TABLET BY MOUTH EVERY DAY AT NIGHT    MIRTAZAPINE (REMERON) 15 MG TABLET    TAKE 1 TABLET BY MOUTH EVERY DAY AT NIGHT    VIBEGRON (GEMTESA) 75 MG TABS    Take by mouth       Allergies     She is allergic to clarithromycin and sulfamethoxazole-trimethoprim. Physical Exam     INITIAL VITALS: BP: (!) 178/65, Temp: 98.2 °F (36.8 °C), Pulse: 62, Resp: 16, SpO2: 97 %   Physical Exam  Vitals and nursing note reviewed. Exam conducted with a chaperone present. Constitutional:       General: She is not in acute distress. Appearance: Normal appearance. She is ill-appearing. HENT:      Head: Normocephalic and atraumatic. Nose: Nose normal.      Mouth/Throat:      Mouth: Mucous membranes are dry. Pharynx: Oropharynx is clear. No oropharyngeal exudate or posterior oropharyngeal erythema. Eyes:      General: No scleral icterus. Extraocular Movements: Extraocular movements intact. Conjunctiva/sclera: Conjunctivae normal.      Pupils: Pupils are equal, round, and reactive to light. Comments: No significant nystagmus on exam.   Cardiovascular:      Rate and Rhythm: Normal rate and regular rhythm. Pulses:           Radial pulses are 1+ on the right side and 1+ on the left side. Dorsalis pedis pulses are 1+ on the right side and 1+ on the left side. Heart sounds: Normal heart sounds. No murmur heard. No friction rub. No gallop. Pulmonary:      Effort: Pulmonary effort is normal. No respiratory distress. Breath sounds: Normal breath sounds. No wheezing, rhonchi or rales. Abdominal:      General: Abdomen is flat. Bowel sounds are normal. There is no distension. Palpations: Abdomen is soft. There is no mass. Tenderness: There is no abdominal tenderness. There is no guarding or rebound. Musculoskeletal:         General: No swelling, tenderness, deformity or signs of injury. Normal range of motion.       Cervical back: Normal range of motion and neck supple. No muscular tenderness. Right lower leg: No edema. Left lower leg: No edema. Skin:     General: Skin is warm and dry. Capillary Refill: Capillary refill takes less than 2 seconds. Findings: No rash. Neurological:      General: No focal deficit present. Mental Status: She is alert. Mental status is at baseline. She is disoriented. GCS: GCS eye subscore is 3. GCS verbal subscore is 4. GCS motor subscore is 6. Cranial Nerves: Cranial nerves are intact. No cranial nerve deficit. Sensory: Sensation is intact. No sensory deficit. Motor: Motor function is intact. No weakness. Coordination: Finger-Nose-Finger Test normal.   Psychiatric:         Mood and Affect: Mood normal.         Behavior: Behavior normal.         Thought Content: Thought content normal.         DiagnosticResults     EKG   Interpreted in conjunction with emergencydepartment physician Charmayne Forget, *  Rhythm: normal sinus   Rate: normal  Axis: normal  Ectopy: none  Conduction: normal  ST Segments: no acute change  T Waves:no acute change  Q Waves: none  Clinical Impression: normal EKG   Comparison:  Similar to EKG from 8/31/00    RADIOLOGY:  CT HEAD WO CONTRAST   Final Result   1. No acute intracranial findings. 2. Global volume loss with moderate chronic small vessel ischemic disease in the white matter. XR CHEST PORTABLE   Final Result   Impression:   1. No acute cardiopulmonary disease.       CTA NECK W CONTRAST    (Results Pending)   CT ABDOMEN PELVIS W IV CONTRAST Additional Contrast? None    (Results Pending)   CTA HEAD NECK W CONTRAST    (Results Pending)       LABS:   Results for orders placed or performed during the hospital encounter of 10/22/21   Urinalysis, reflex to microscopic (Lab)   Result Value Ref Range    Color, UA Yellow Straw/Yellow    Clarity, UA Clear Clear    Glucose, Ur Negative Negative mg/dL    Bilirubin Urine Negative Negative    Ketones, Urine Negative Negative mg/dL    Specific Gravity, UA 1.020 1.005 - 1.030    Blood, Urine MODERATE (A) Negative    pH, UA 7.0 5.0 - 8.0    Protein, UA Negative Negative mg/dL    Urobilinogen, Urine 0.2 <2.0 E.U./dL    Nitrite, Urine Negative Negative    Leukocyte Esterase, Urine Negative Negative    Microscopic Examination YES     Urine Type Voided    CBC auto differential   Result Value Ref Range    WBC 8.4 4.0 - 11.0 K/uL    RBC 4.71 4.00 - 5.20 M/uL    Hemoglobin 14.0 12.0 - 16.0 g/dL    Hematocrit 41.0 36.0 - 48.0 %    MCV 87.0 80.0 - 100.0 fL    MCH 29.8 26.0 - 34.0 pg    MCHC 34.2 31.0 - 36.0 g/dL    RDW 14.8 12.4 - 15.4 %    Platelets 795 203 - 930 K/uL    MPV 8.0 5.0 - 10.5 fL    Neutrophils % 81.0 %    Lymphocytes % 11.0 %    Monocytes % 7.6 %    Eosinophils % 0.2 %    Basophils % 0.2 %    Neutrophils Absolute 6.8 1.7 - 7.7 K/uL    Lymphocytes Absolute 0.9 (L) 1.0 - 5.1 K/uL    Monocytes Absolute 0.6 0.0 - 1.3 K/uL    Eosinophils Absolute 0.0 0.0 - 0.6 K/uL    Basophils Absolute 0.0 0.0 - 0.2 K/uL   Comprehensive Metabolic Panel w/ Reflex to MG   Result Value Ref Range    Sodium 139 136 - 145 mmol/L    Potassium reflex Magnesium 3.8 3.5 - 5.1 mmol/L    Chloride 103 99 - 110 mmol/L    CO2 22 21 - 32 mmol/L    Anion Gap 14 3 - 16    Glucose 145 (H) 70 - 99 mg/dL    BUN 14 7 - 20 mg/dL    CREATININE 0.7 0.6 - 1.2 mg/dL    GFR Non-African American >60 >60    GFR African American >60 >60    Calcium 8.3 8.3 - 10.6 mg/dL    Total Protein 6.6 6.4 - 8.2 g/dL    Albumin 3.3 (L) 3.4 - 5.0 g/dL    Albumin/Globulin Ratio 1.0 (L) 1.1 - 2.2    Total Bilirubin 0.9 0.0 - 1.0 mg/dL    Alkaline Phosphatase 158 (H) 40 - 129 U/L    ALT 14 10 - 40 U/L    AST 20 15 - 37 U/L    Globulin 3.3 Not Established g/dL   Microscopic Urinalysis   Result Value Ref Range    WBC, UA None seen 0 - 5 /HPF    RBC, UA 3-4 0 - 4 /HPF    Epithelial Cells, UA 0-1 0 - 5 /HPF    Bacteria, UA Rare (A) None Seen /HPF   EKG 12 Lead   Result Value Ref Range    Ventricular Rate 69 BPM    Atrial Rate 69 BPM    P-R Interval 186 ms    QRS Duration 74 ms    Q-T Interval 426 ms    QTc Calculation (Bazett) 456 ms    P Axis 56 degrees    R Axis 70 degrees    T Axis 77 degrees    Diagnosis       EKG performed in ER and to be interpreted by ER physician. Confirmed by MD, ER ([id number]),  Murtaza Potter ([phone]) on 10/22/2021 6:46:33 PM       ED BEDSIDE ULTRASOUND:  none    RECENT VITALS:  BP: (!) 178/65, Temp: 98.2 °F (36.8 °C), Pulse: 62,Resp: 16, SpO2: 97 %     Procedures     none    ED Course     Nursing Notes, Past Medical Hx, Past Surgical Hx, Social Hx, Allergies, and Family Hx were reviewed. The patient was given the followingmedications:  Orders Placed This Encounter   Medications    lactated ringers infusion 1,000 mL    iopamidol (ISOVUE-370) 76 % injection 80 mL       CONSULTS:  None    MEDICAL DECISION MAKING / ASSESSMENT / Ilsa Shayna is a [de-identified] y.o. female the patient presented with a history listed above. She is a 52-year-old female with a history of dementia coming in with nausea and vomiting and headache for approximately 12 to 24 hours. She was recently treated for urinary tract infection but successfully completed a course of Macrobid. On exam, she has dry mucous membranes and is disoriented with concern for delirium. She also holds her eyes closed with concern for a vertiginous source of her symptoms including central vertigo. CT scan of the abdomen pelvis as well as CT Noncon and angio of the head and neck were ordered to evaluate for intra-abdominal infection as well as for causes of central vertigo. These are pending at this time. Chest x-ray was within normal limits. She has a normal white blood cell count and thus far reassuring labs. EKG without ischemic changes.   At this time the patient's differential remains broad including infection, toxic metabolic although less likely given her lab results, neurologic processes including ischemia or vascular abnormality. At this point I am going off service and have signed out the care of this patient to the oncoming provider. My colleagues responsibilities will include: Follow-up on labs and imaging, reassessment, disposition. This patient was also evaluated by the attending physician. All care plans werediscussed and agreed upon. Clinical Impression     1. Nausea and vomiting, intractability of vomiting not specified, unspecified vomiting type    2. Delirium        Disposition     PATIENT REFERRED TO:  No follow-up provider specified.     DISCHARGE MEDICATIONS:  New Prescriptions    No medications on file       DISPOSITION          Manuel Newsome MD  10/22/21 5624

## 2021-10-23 LAB — SARS-COV-2: NOT DETECTED

## 2021-10-23 NOTE — ED PROVIDER NOTES
[address] [facility] ENCOUNTER          PHYSICIAN ASSISTANT NOTE     Date of evaluation: 10/22/2021    ADDENDUM:      Care of this patient was assumed from colleague, Dr. Twila Flowers. The patient was seen for Emesis (vomiting for 1 day, being treated for a uti for last 2 wks. feeling tired. ) The patient's initial evaluation and plan have been discussed with the prior provider who initially evaluated the patient. Nursing Notes, Past Medical Hx, Past Surgical Hx, Social Hx, Allergies, and Family Hx were all reviewed. Diagnostic Results     EKG   Please see the prior providers note for any EKG and its interpretation    RADIOLOGY:  CTA HEAD NECK W CONTRAST   Final Result      1. Patent carotid and vertebral arteries. 2. Mild atherosclerotic stenosis of the proximal left internal carotid artery measuring 30% in severity. 3. Mild soft atherosclerotic plaque at the right carotid bulb without stenosis. 4. Incidental note of a 1.1 cm circumscribed solid enhancing mass of the deep aspect of the left parotid gland. This is nonspecific and could be benign or malignant in etiology. 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 ENT consultation    recommended for follow-up. 5. 7 mm groundglass density nodule at the right lung apex which is most likely benign or inflammatory in etiology but technically nonspecific. Recommend a follow-up chest CT without contrast in 3 months to evaluate for persistence of the groundglass    density. PROCEDURE: CT ANGIOGRAPHY HEAD WITH/WITHOUT CONTRAST      INDICATION: concern for stroke      COMPARISON: none      TECHNIQUE: Axial CT imaging obtained through the head prior to and following administration of IV contrast. Axial images, multiplanar reformatted images, and maximum intensity projection images were reviewed for CT angiographic technique. IV contrast: 80 mL Omnipaque 350.       FINDINGS:      ANTERIOR CIRCULATION: The intracranial internal carotid arteries, anterior cerebral arteries, and middle cerebral arteries demonstrate no occlusion or stenosis. No evidence for aneurysm or arteriovenous malformation. POSTERIOR CIRCULATION: The bilateral vertebral arteries, basilar artery and posterior cerebral arteries demonstrate no occlusion or stenosis. No evidence for aneurysm or arteriovenous malformation. INTRACRANIAL VENOUS SYSTEM: No evidence for intracranial venous thrombosis. INTRACRANIAL HEMORRHAGE: None. VENTRICLES: Normal in size and configuration for age. BRAIN PARENCHYMA: Mild global volume loss with moderate small vessel ischemic disease in the white matter. SKULL: No destructive osseous process or fracture. PARANASAL SINUSES / MASTOIDS: No acute sinusitis or mastoiditis. ORBITS: Normal.      EXTRACRANIAL SOFT TISSUES: Normal.      OTHER: None. IMPRESSION:      1. Patent intracranial vessels. CT ABDOMEN PELVIS W IV CONTRAST Additional Contrast? None   Final Result      1. No acute findings in the abdomen or pelvis. CTA NECK W CONTRAST   Final Result      1. Patent carotid and vertebral arteries. 2. Mild atherosclerotic stenosis of the proximal left internal carotid artery measuring 30% in severity. 3. Mild soft atherosclerotic plaque at the right carotid bulb without stenosis. 4. Incidental note of a 1.1 cm circumscribed solid enhancing mass of the deep aspect of the left parotid gland. This is nonspecific and could be benign or malignant in etiology. 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 ENT consultation    recommended for follow-up. 5. 7 mm groundglass density nodule at the right lung apex which is most likely benign or inflammatory in etiology but technically nonspecific. Recommend a follow-up chest CT without contrast in 3 months to evaluate for persistence of the groundglass    density.             PROCEDURE: CT ANGIOGRAPHY HEAD WITH/WITHOUT CONTRAST      INDICATION: concern for stroke      COMPARISON: none      TECHNIQUE: Axial CT imaging obtained through the head prior to and following administration of IV contrast. Axial images, multiplanar reformatted images, and maximum intensity projection images were reviewed for CT angiographic technique. IV contrast: 80 mL Omnipaque 350. FINDINGS:      ANTERIOR CIRCULATION: The intracranial internal carotid arteries, anterior cerebral arteries, and middle cerebral arteries demonstrate no occlusion or stenosis. No evidence for aneurysm or arteriovenous malformation. POSTERIOR CIRCULATION: The bilateral vertebral arteries, basilar artery and posterior cerebral arteries demonstrate no occlusion or stenosis. No evidence for aneurysm or arteriovenous malformation. INTRACRANIAL VENOUS SYSTEM: No evidence for intracranial venous thrombosis. INTRACRANIAL HEMORRHAGE: None. VENTRICLES: Normal in size and configuration for age. BRAIN PARENCHYMA: Mild global volume loss with moderate small vessel ischemic disease in the white matter. SKULL: No destructive osseous process or fracture. PARANASAL SINUSES / MASTOIDS: No acute sinusitis or mastoiditis. ORBITS: Normal.      EXTRACRANIAL SOFT TISSUES: Normal.      OTHER: None. IMPRESSION:      1. Patent intracranial vessels. CT HEAD WO CONTRAST   Final Result   1. No acute intracranial findings. 2. Global volume loss with moderate chronic small vessel ischemic disease in the white matter. XR CHEST PORTABLE   Final Result   Impression:   1. No acute cardiopulmonary disease.           LABS:   Results for orders placed or performed during the hospital encounter of 10/22/21   Urinalysis, reflex to microscopic (Lab)   Result Value Ref Range    Color, UA Yellow Straw/Yellow    Clarity, UA Clear Clear    Glucose, Ur Negative Negative mg/dL    Bilirubin Urine Negative Negative    Ketones, Urine Negative Negative mg/dL    Specific Gravity, UA 1.020 1.005 - 1.030    Blood, Urine MODERATE (A) Negative    pH, UA 7.0 5.0 - 8.0    Protein, UA Negative Negative mg/dL    Urobilinogen, Urine 0.2 <2.0 E.U./dL    Nitrite, Urine Negative Negative    Leukocyte Esterase, Urine Negative Negative    Microscopic Examination YES     Urine Type Voided    CBC auto differential   Result Value Ref Range    WBC 8.4 4.0 - 11.0 K/uL    RBC 4.71 4.00 - 5.20 M/uL    Hemoglobin 14.0 12.0 - 16.0 g/dL    Hematocrit 41.0 36.0 - 48.0 %    MCV 87.0 80.0 - 100.0 fL    MCH 29.8 26.0 - 34.0 pg    MCHC 34.2 31.0 - 36.0 g/dL    RDW 14.8 12.4 - 15.4 %    Platelets 596 562 - 023 K/uL    MPV 8.0 5.0 - 10.5 fL    Neutrophils % 81.0 %    Lymphocytes % 11.0 %    Monocytes % 7.6 %    Eosinophils % 0.2 %    Basophils % 0.2 %    Neutrophils Absolute 6.8 1.7 - 7.7 K/uL    Lymphocytes Absolute 0.9 (L) 1.0 - 5.1 K/uL    Monocytes Absolute 0.6 0.0 - 1.3 K/uL    Eosinophils Absolute 0.0 0.0 - 0.6 K/uL    Basophils Absolute 0.0 0.0 - 0.2 K/uL   Comprehensive Metabolic Panel w/ Reflex to MG   Result Value Ref Range    Sodium 139 136 - 145 mmol/L    Potassium reflex Magnesium 3.8 3.5 - 5.1 mmol/L    Chloride 103 99 - 110 mmol/L    CO2 22 21 - 32 mmol/L    Anion Gap 14 3 - 16    Glucose 145 (H) 70 - 99 mg/dL    BUN 14 7 - 20 mg/dL    CREATININE 0.7 0.6 - 1.2 mg/dL    GFR Non-African American >60 >60    GFR African American >60 >60    Calcium 8.3 8.3 - 10.6 mg/dL    Total Protein 6.6 6.4 - 8.2 g/dL    Albumin 3.3 (L) 3.4 - 5.0 g/dL    Albumin/Globulin Ratio 1.0 (L) 1.1 - 2.2    Total Bilirubin 0.9 0.0 - 1.0 mg/dL    Alkaline Phosphatase 158 (H) 40 - 129 U/L    ALT 14 10 - 40 U/L    AST 20 15 - 37 U/L    Globulin 3.3 Not Established g/dL   Lactate, Sepsis   Result Value Ref Range    Lactic Acid, Sepsis 2.3 (H) 0.4 - 1.9 mmol/L   Lipase   Result Value Ref Range    Lipase 35.0 13.0 - 60.0 U/L   Troponin   Result Value Ref Range    Troponin <0.01 <0.01 ng/mL   Brain Natriuretic headache over the past 12 hours. The patient's daughter noted that the patient had been acting somewhat disoriented and the patient appeared to be experiencing mild delirium on presentation. The patient underwent a work-up for dizziness versus infectious etiology by the prior emergency department team.  Please see their note for details of their history, physical and diagnostic evaluation. At the time of turnover, the patient was pending the results of CT scan of the head, angiogram of the head of neck and CT scan of the abdomen and pelvis. No acute abnormalities noted on CT/CTA of the head neck. There was an incidental note of 1.1 cm circumscribed solid enhancing mass of the disease aspect of the left parotid gland which was nonspecific. 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 There is also an incidental note of a 7 mm groundglass density nodule at the right lung apex which is technically nonspecific but radiology recommends a follow-up chest CT without contrast in 3 months to evaluate for persistence of the groundglass density. The patient will be given ENT consultation for follow-up. On reevaluation, the patient had improved significantly after IV fluids and antiemesis medications. The patient is now lucid, no longer demonstrating signs of delirium, her color is improved and she feels significantly better. The patient was able to ambulate without significant nausea or any dizziness. It appears that her signs and symptoms may be most consistent with gastroenteritis and dehydration (as her lactate had been 2.3 from earlier). She completed a liter of IV fluids. We will prescribe the patient Zofran for home management of any further symptoms and she will follow-up on Monday with her primary care physician. Strict return precautions for the development of worsening symptoms. I discussed this plan at length the patient who verbalizes understanding and is in agreement.  The patient is currently stable and will be discharged home for continued self-care. Please see patient's AVS for additional discharge instructions. The patient was seen and evaluated by myself and the attending physician, Felicia Ashford MD  who agrees with my assessment, treatment and plan. Clinical Impression     1.  Nausea and vomiting, intractability of vomiting not specified, unspecified vomiting type        Disposition     PATIENT REFERRED TO:  Jing Alegre, [address]  [phone]    Schedule an appointment as soon as possible for a visit       [facility] Emergency Department  [address]  Go to   If symptoms worsen      DISCHARGE MEDICATIONS:  New Prescriptions    ONDANSETRON (ZOFRAN ODT) 4 MG DISINTEGRATING TABLET    Take 1 tablet by mouth every 8 hours as needed for Nausea       DISPOSITION Discharge - Pending Orders Complete 10/22/2021 10:09:29 PM         [address] [place] [address]  10/22/21 [id number]

## 2021-10-23 NOTE — ED PROVIDER NOTES
ED Attending Attestation Note     Date of evaluation: 10/22/2021    This patient was seen by the resident. I have seen and examined the patient, agree with the workup, evaluation, management and diagnosis. The care plan has been discussed. I have reviewed the ECG and concur with the resident's interpretation. My assessment reveals elderly female lying in bed with complaint of nausea and vomiting x1 day, as well as headache today. Patient has a history of dementia and daughter provides much of the history. She has preserved strength, and when she opens her eyes is able to perform finger-nose testing grossly normally. Her abdomen is soft and nontender. Given her persistent symptoms, inability to really provide much of a history of her fully participate in exam due to her dementia, will take a broad work-up including CT scan of the head and abdomen.      Vianney Hughes MD  10/22/21 [id number]

## 2021-10-23 NOTE — ED NOTES
Ambulated pt 20 steps pt c/o of dizziness and nausea when she sat back in the bed.      Roverto Mathias Page  10/22/21 9088

## 2021-11-12 ENCOUNTER — PATIENT MESSAGE (OUTPATIENT)
Dept: INTERNAL MEDICINE CLINIC | Age: 81
End: 2021-11-12

## 2021-11-15 NOTE — TELEPHONE ENCOUNTER
From: Piyush Marshall  To: Dr. Mamadou Eller: 2021 8:20 PM EST  Subject: Non-Urgent Medical Question    My mom Lisette Pacheco  1940. Seems to have a yeast infection. Would she see you or the urologist? Given that it is Friday night. Can you call in a prescription or should we go to urgent care in the morning. Thanks AKIL! Ankit.  [phone]

## 2021-11-17 ENCOUNTER — HOSPITAL ENCOUNTER (OUTPATIENT)
Age: 81
Setting detail: OBSERVATION
LOS: 1 days | Discharge: HOME | End: 2021-11-18
Attending: EMERGENCY MEDICINE | Admitting: INTERNAL MEDICINE
Payer: MEDICARE

## 2021-11-17 ENCOUNTER — APPOINTMENT (OUTPATIENT)
Dept: GENERAL RADIOLOGY | Age: 81
End: 2021-11-17
Payer: MEDICARE

## 2021-11-17 ENCOUNTER — APPOINTMENT (OUTPATIENT)
Dept: CT IMAGING | Age: 81
End: 2021-11-17
Payer: MEDICARE

## 2021-11-17 DIAGNOSIS — R55 SYNCOPE AND COLLAPSE: Primary | ICD-10-CM

## 2021-11-17 LAB
A/G RATIO: 1.2 (ref 1.1–2.2)
ALBUMIN SERPL-MCNC: 3.8 G/DL (ref 3.4–5)
ALP BLD-CCNC: 160 U/L (ref 40–129)
ALT SERPL-CCNC: 18 U/L (ref 10–40)
AMORPHOUS: ABNORMAL /HPF
ANION GAP SERPL CALCULATED.3IONS-SCNC: 14 MMOL/L (ref 3–16)
AST SERPL-CCNC: 21 U/L (ref 15–37)
BASE EXCESS VENOUS: -0.7 MMOL/L (ref -2–3)
BASOPHILS ABSOLUTE: 0 K/UL (ref 0–0.2)
BASOPHILS RELATIVE PERCENT: 0.4 %
BILIRUB SERPL-MCNC: 1 MG/DL (ref 0–1)
BILIRUBIN URINE: NEGATIVE
BLOOD, URINE: ABNORMAL
BUN BLDV-MCNC: 22 MG/DL (ref 7–20)
CALCIUM SERPL-MCNC: 8.6 MG/DL (ref 8.3–10.6)
CARBOXYHEMOGLOBIN: 0.8 % (ref 0–1.5)
CHLORIDE BLD-SCNC: 105 MMOL/L (ref 99–110)
CLARITY: ABNORMAL
CO2: 22 MMOL/L (ref 21–32)
COLOR: YELLOW
CREAT SERPL-MCNC: 0.9 MG/DL (ref 0.6–1.2)
EKG ATRIAL RATE: 71 BPM
EKG DIAGNOSIS: NORMAL
EKG P AXIS: 57 DEGREES
EKG P-R INTERVAL: 168 MS
EKG Q-T INTERVAL: 424 MS
EKG QRS DURATION: 78 MS
EKG QTC CALCULATION (BAZETT): 460 MS
EKG R AXIS: 74 DEGREES
EKG T AXIS: 90 DEGREES
EKG VENTRICULAR RATE: 71 BPM
EOSINOPHILS ABSOLUTE: 0.2 K/UL (ref 0–0.6)
EOSINOPHILS RELATIVE PERCENT: 2 %
EPITHELIAL CELLS, UA: ABNORMAL /HPF (ref 0–5)
GFR AFRICAN AMERICAN: >60
GFR NON-AFRICAN AMERICAN: >60
GLUCOSE BLD-MCNC: 197 MG/DL (ref 70–99)
GLUCOSE URINE: NEGATIVE MG/DL
HCO3 VENOUS: 25.1 MMOL/L (ref 24–28)
HCT VFR BLD CALC: 43.4 % (ref 36–48)
HEMOGLOBIN: 14.5 G/DL (ref 12–16)
HYALINE CASTS: ABNORMAL /LPF (ref 0–2)
KETONES, URINE: NEGATIVE MG/DL
LACTIC ACID: 2.2 MMOL/L (ref 0.4–2)
LEUKOCYTE ESTERASE, URINE: NEGATIVE
LYMPHOCYTES ABSOLUTE: 1.9 K/UL (ref 1–5.1)
LYMPHOCYTES RELATIVE PERCENT: 24.1 %
MAGNESIUM: 2.1 MG/DL (ref 1.8–2.4)
MCH RBC QN AUTO: 29 PG (ref 26–34)
MCHC RBC AUTO-ENTMCNC: 33.4 G/DL (ref 31–36)
MCV RBC AUTO: 86.8 FL (ref 80–100)
METHEMOGLOBIN VENOUS: <0 % (ref 0–1.5)
MICROSCOPIC EXAMINATION: YES
MONOCYTES ABSOLUTE: 0.8 K/UL (ref 0–1.3)
MONOCYTES RELATIVE PERCENT: 9.8 %
MUCUS: ABNORMAL /LPF
NEUTROPHILS ABSOLUTE: 5 K/UL (ref 1.7–7.7)
NEUTROPHILS RELATIVE PERCENT: 63.7 %
NITRITE, URINE: NEGATIVE
O2 SAT, VEN: 99 %
PCO2, VEN: 44.6 MMHG (ref 41–51)
PDW BLD-RTO: 14.9 % (ref 12.4–15.4)
PH UA: 6 (ref 5–8)
PH VENOUS: 7.36 (ref 7.35–7.45)
PLATELET # BLD: 274 K/UL (ref 135–450)
PMV BLD AUTO: 8.5 FL (ref 5–10.5)
PO2, VEN: 157 MMHG (ref 25–40)
POTASSIUM REFLEX MAGNESIUM: 3.4 MMOL/L (ref 3.5–5.1)
PRO-BNP: 265 PG/ML (ref 0–449)
PROTEIN UA: 30 MG/DL
RBC # BLD: 5 M/UL (ref 4–5.2)
RBC UA: ABNORMAL /HPF (ref 0–4)
SODIUM BLD-SCNC: 141 MMOL/L (ref 136–145)
SPECIFIC GRAVITY UA: >=1.03 (ref 1–1.03)
TCO2 CALC VENOUS: 27 MMOL/L
TOTAL PROTEIN: 6.9 G/DL (ref 6.4–8.2)
TROPONIN: <0.01 NG/ML
URINE REFLEX TO CULTURE: ABNORMAL
URINE TYPE: ABNORMAL
UROBILINOGEN, URINE: 1 E.U./DL
WBC # BLD: 7.8 K/UL (ref 4–11)
WBC UA: ABNORMAL /HPF (ref 0–5)

## 2021-11-17 PROCEDURE — 83880 ASSAY OF NATRIURETIC PEPTIDE: CPT

## 2021-11-17 PROCEDURE — 82803 BLOOD GASES ANY COMBINATION: CPT

## 2021-11-17 PROCEDURE — 99285 EMERGENCY DEPT VISIT HI MDM: CPT

## 2021-11-17 PROCEDURE — 71046 X-RAY EXAM CHEST 2 VIEWS: CPT

## 2021-11-17 PROCEDURE — 2580000003 HC RX 258: Performed by: EMERGENCY MEDICINE

## 2021-11-17 PROCEDURE — 2580000003 HC RX 258: Performed by: INTERNAL MEDICINE

## 2021-11-17 PROCEDURE — 93005 ELECTROCARDIOGRAM TRACING: CPT | Performed by: EMERGENCY MEDICINE

## 2021-11-17 PROCEDURE — 81001 URINALYSIS AUTO W/SCOPE: CPT

## 2021-11-17 PROCEDURE — 84484 ASSAY OF TROPONIN QUANT: CPT

## 2021-11-17 PROCEDURE — 96372 THER/PROPH/DIAG INJ SC/IM: CPT

## 2021-11-17 PROCEDURE — 85025 COMPLETE CBC W/AUTO DIFF WBC: CPT

## 2021-11-17 PROCEDURE — 70450 CT HEAD/BRAIN W/O DYE: CPT

## 2021-11-17 PROCEDURE — G0378 HOSPITAL OBSERVATION PER HR: HCPCS

## 2021-11-17 PROCEDURE — 83735 ASSAY OF MAGNESIUM: CPT

## 2021-11-17 PROCEDURE — 2500000003 HC RX 250 WO HCPCS: Performed by: INTERNAL MEDICINE

## 2021-11-17 PROCEDURE — 83605 ASSAY OF LACTIC ACID: CPT

## 2021-11-17 PROCEDURE — 6370000000 HC RX 637 (ALT 250 FOR IP): Performed by: INTERNAL MEDICINE

## 2021-11-17 PROCEDURE — 80053 COMPREHEN METABOLIC PANEL: CPT

## 2021-11-17 PROCEDURE — 6360000002 HC RX W HCPCS: Performed by: INTERNAL MEDICINE

## 2021-11-17 RX ORDER — ASPIRIN 81 MG/1
81 TABLET ORAL NIGHTLY
Status: DISCONTINUED | OUTPATIENT
Start: 2021-11-17 | End: 2021-11-18 | Stop reason: HOSPADM

## 2021-11-17 RX ORDER — ACETAMINOPHEN 325 MG/1
650 TABLET ORAL EVERY 6 HOURS PRN
Status: DISCONTINUED | OUTPATIENT
Start: 2021-11-17 | End: 2021-11-18 | Stop reason: HOSPADM

## 2021-11-17 RX ORDER — SODIUM CHLORIDE 9 MG/ML
25 INJECTION, SOLUTION INTRAVENOUS PRN
Status: DISCONTINUED | OUTPATIENT
Start: 2021-11-17 | End: 2021-11-18 | Stop reason: HOSPADM

## 2021-11-17 RX ORDER — SODIUM CHLORIDE 9 MG/ML
INJECTION, SOLUTION INTRAVENOUS CONTINUOUS
Status: DISCONTINUED | OUTPATIENT
Start: 2021-11-17 | End: 2021-11-17

## 2021-11-17 RX ORDER — SODIUM CHLORIDE, SODIUM LACTATE, POTASSIUM CHLORIDE, AND CALCIUM CHLORIDE .6; .31; .03; .02 G/100ML; G/100ML; G/100ML; G/100ML
500 INJECTION, SOLUTION INTRAVENOUS ONCE
Status: COMPLETED | OUTPATIENT
Start: 2021-11-17 | End: 2021-11-17

## 2021-11-17 RX ORDER — SODIUM CHLORIDE 0.9 % (FLUSH) 0.9 %
5-40 SYRINGE (ML) INJECTION EVERY 12 HOURS SCHEDULED
Status: DISCONTINUED | OUTPATIENT
Start: 2021-11-17 | End: 2021-11-18 | Stop reason: HOSPADM

## 2021-11-17 RX ORDER — ATORVASTATIN CALCIUM 40 MG/1
40 TABLET, FILM COATED ORAL NIGHTLY
Status: DISCONTINUED | OUTPATIENT
Start: 2021-11-17 | End: 2021-11-18 | Stop reason: HOSPADM

## 2021-11-17 RX ORDER — DONEPEZIL HYDROCHLORIDE 5 MG/1
5 TABLET, FILM COATED ORAL NIGHTLY
Status: DISCONTINUED | OUTPATIENT
Start: 2021-11-17 | End: 2021-11-18 | Stop reason: HOSPADM

## 2021-11-17 RX ORDER — TROSPIUM CHLORIDE 20 MG/1
20 TABLET, FILM COATED ORAL NIGHTLY
Status: DISCONTINUED | OUTPATIENT
Start: 2021-11-17 | End: 2021-11-18 | Stop reason: HOSPADM

## 2021-11-17 RX ORDER — POLYETHYLENE GLYCOL 3350 17 G/17G
17 POWDER, FOR SOLUTION ORAL DAILY PRN
Status: DISCONTINUED | OUTPATIENT
Start: 2021-11-17 | End: 2021-11-18 | Stop reason: HOSPADM

## 2021-11-17 RX ORDER — MIRTAZAPINE 15 MG/1
15 TABLET, FILM COATED ORAL NIGHTLY
Status: DISCONTINUED | OUTPATIENT
Start: 2021-11-17 | End: 2021-11-18 | Stop reason: HOSPADM

## 2021-11-17 RX ORDER — SODIUM CHLORIDE 0.9 % (FLUSH) 0.9 %
5-40 SYRINGE (ML) INJECTION PRN
Status: DISCONTINUED | OUTPATIENT
Start: 2021-11-17 | End: 2021-11-18 | Stop reason: HOSPADM

## 2021-11-17 RX ORDER — ONDANSETRON 2 MG/ML
4 INJECTION INTRAMUSCULAR; INTRAVENOUS EVERY 6 HOURS PRN
Status: DISCONTINUED | OUTPATIENT
Start: 2021-11-17 | End: 2021-11-18 | Stop reason: HOSPADM

## 2021-11-17 RX ORDER — ONDANSETRON 4 MG/1
4 TABLET, ORALLY DISINTEGRATING ORAL EVERY 8 HOURS PRN
Status: DISCONTINUED | OUTPATIENT
Start: 2021-11-17 | End: 2021-11-18 | Stop reason: HOSPADM

## 2021-11-17 RX ORDER — ACETAMINOPHEN 650 MG/1
650 SUPPOSITORY RECTAL EVERY 6 HOURS PRN
Status: DISCONTINUED | OUTPATIENT
Start: 2021-11-17 | End: 2021-11-18 | Stop reason: HOSPADM

## 2021-11-17 RX ADMIN — SODIUM CHLORIDE: 9 INJECTION, SOLUTION INTRAVENOUS at 12:36

## 2021-11-17 RX ADMIN — SODIUM CHLORIDE, PRESERVATIVE FREE 10 ML: 5 INJECTION INTRAVENOUS at 20:28

## 2021-11-17 RX ADMIN — SODIUM CHLORIDE, POTASSIUM CHLORIDE, SODIUM LACTATE AND CALCIUM CHLORIDE 500 ML: 600; 310; 30; 20 INJECTION, SOLUTION INTRAVENOUS at 08:43

## 2021-11-17 RX ADMIN — ENOXAPARIN SODIUM 40 MG: 100 INJECTION SUBCUTANEOUS at 16:12

## 2021-11-17 RX ADMIN — TROSPIUM CHLORIDE 20 MG: 20 TABLET, FILM COATED ORAL at 20:28

## 2021-11-17 RX ADMIN — MICONAZOLE NITRATE: 20 POWDER TOPICAL at 20:28

## 2021-11-17 RX ADMIN — ASPIRIN 81 MG: 81 TABLET, COATED ORAL at 20:28

## 2021-11-17 RX ADMIN — MIRTAZAPINE 15 MG: 15 TABLET, FILM COATED ORAL at 20:28

## 2021-11-17 RX ADMIN — ATORVASTATIN CALCIUM 40 MG: 40 TABLET, FILM COATED ORAL at 20:28

## 2021-11-17 RX ADMIN — DONEPEZIL HYDROCHLORIDE 5 MG: 5 TABLET, FILM COATED ORAL at 20:28

## 2021-11-17 ASSESSMENT — PAIN SCALES - GENERAL
PAINLEVEL_OUTOF10: 0

## 2021-11-17 NOTE — Clinical Note
Patient Class: Inpatient [101]   REQUIRED: Diagnosis: Syncope and collapse [780. 2. ICD-9-CM]   Estimated Length of Stay: Estimated stay of more than 2 midnights

## 2021-11-17 NOTE — ED NOTES
Bed: A04-04  Expected date:   Expected time:   Means of arrival:   Comments:  Laurel Mooney RN  11/17/21 [id number]

## 2021-11-17 NOTE — ED PROVIDER NOTES
[address] [facility]          ATTENDING PHYSICIAN NOTE       Date of evaluation: 11/17/2021    Chief Complaint     Loss of Consciousness (EMS reports syncope in the shower this morning. Pt denies pain but states hasn't felt good since last night. Doesn't provide specifics. Received Zofran en route. Glucose 170. VSS)      History of Present Illness     Asad Andrea is a [de-identified] y.o. female who presents from home because of a possible syncopal episode. Initial history that we obtained from EMS indicated that the patient had passed out in the shower. The patient herself does not answer any questions for us and therefore history is not obtainable from her. It is not clear what her baseline is. Another family member did call and speak to the nurse and it was noted that the patient was in the shower today and appeared to have fallen asleep. Apparently the patient sleeps all day and all night and was in the shower today to get ready for a doctor's appointment to be evaluated for a fungal rash in her groin. The patient's daughter arrived to give complete history and indicated that the patient was in the shower getting ready for a doctor's appointment today when she called out and said that she was lightheaded and felt like she was going to pass out and her daughter had to help her to the toilet seat to sit down where she actually had a syncopal episode lasting 1 to 2 minutes. The patient appeared to have shallow respirations and periods of apnea during this time. Apparently the patient had 3 similar episodes a couple of years ago in [place] where she lived and this was worked up in the hospital and she saw a cardiologist and a neurologist but an etiology was never determined. Her presentation was almost identical at that time.   It was attributed to potentially a transient drop in her blood pressure causing her to feel as if she was going to pass out followed by a syncopal episode. Review of Systems     Review of Systems   Unable to perform ROS: Patient nonverbal       Past Medical, Surgical, Family, and Social History     She has a past medical history of Anxiety, Dementia ([address] Hyperlipidemia, and Hypertension. She has no past surgical history on file. Her family history is not on file. She reports that she has never smoked. She has never used smokeless tobacco. She reports that she does not drink alcohol and does not use drugs. Medications     Previous Medications    ASPIRIN 81 MG EC TABLET    Take 81 mg by mouth nightly     ATORVASTATIN (LIPITOR) 40 MG TABLET    Take 1 tablet by mouth daily    DONEPEZIL (ARICEPT) 5 MG TABLET    TAKE 1 TABLET BY MOUTH EVERY DAY AT NIGHT    MIRTAZAPINE (REMERON) 15 MG TABLET    TAKE 1 TABLET BY MOUTH EVERY DAY AT NIGHT    VIBEGRON (GEMTESA) 75 MG TABS    Take 75 mg by mouth nightly        Allergies     She is allergic to clarithromycin and sulfamethoxazole-trimethoprim. Physical Exam     INITIAL VITALS: BP: (!) 158/69, Temp: 97.4 °F (36.3 °C), Pulse: 68, Resp: 20, SpO2: 92 % ([email or web address])   Physical Exam  Vitals and nursing note reviewed. Constitutional:       General: She is not in acute distress. Appearance: She is well-developed. She is not diaphoretic. HENT:      Head: Normocephalic and atraumatic. Eyes:      Extraocular Movements: Extraocular movements intact. Pupils: Pupils are equal, round, and reactive to light. Cardiovascular:      Rate and Rhythm: Normal rate and regular rhythm. Pulmonary:      Effort: Pulmonary effort is normal.      Breath sounds: Normal breath sounds. Abdominal:      General: Bowel sounds are normal. There is no distension. Palpations: Abdomen is soft. Tenderness: There is no abdominal tenderness. Musculoskeletal:         General: Normal range of motion. Skin:     General: Skin is warm and dry. Findings: Rash (bilateral inguinal tinea cruris) present. Neurological:      Mental Status: She is confused. Comments: Somnolent   Psychiatric:         Behavior: Behavior normal.         Diagnostic Results     EKG   Interpreted by Lind Cheadle, MD     Rhythm: normal sinus   Rate: normal  Axis: normal  Ectopy: none  Conduction: normal  ST Segments: no acute change  T Waves: no acute change  Q Waves: none    Clinical Impression: normal sinus rhythm with no acute changes/normal EKG      RADIOLOGY:  XR CHEST (2 VW)   Final Result      1. No acute disease. CT Head WO Contrast   Final Result      1. No acute intracranial hemorrhage or mass effect. 2. Moderate cortical atrophy. 3. Moderate patchy white matter disease.           LABS:   Results for orders placed or performed during the hospital encounter of 11/17/21   CBC Auto Differential   Result Value Ref Range    WBC 7.8 4.0 - 11.0 K/uL    RBC 5.00 4.00 - 5.20 M/uL    Hemoglobin 14.5 12.0 - 16.0 g/dL    Hematocrit 43.4 36.0 - 48.0 %    MCV 86.8 80.0 - 100.0 fL    MCH 29.0 26.0 - 34.0 pg    MCHC 33.4 31.0 - 36.0 g/dL    RDW 14.9 12.4 - 15.4 %    Platelets 898 154 - 764 K/uL    MPV 8.5 5.0 - 10.5 fL    Neutrophils % 63.7 %    Lymphocytes % 24.1 %    Monocytes % 9.8 %    Eosinophils % 2.0 %    Basophils % 0.4 %    Neutrophils Absolute 5.0 1.7 - 7.7 K/uL    Lymphocytes Absolute 1.9 1.0 - 5.1 K/uL    Monocytes Absolute 0.8 0.0 - 1.3 K/uL    Eosinophils Absolute 0.2 0.0 - 0.6 K/uL    Basophils Absolute 0.0 0.0 - 0.2 K/uL   Comprehensive Metabolic Panel w/ Reflex to MG   Result Value Ref Range    Sodium 141 136 - 145 mmol/L    Potassium reflex Magnesium 3.4 (L) 3.5 - 5.1 mmol/L    Chloride 105 99 - 110 mmol/L    CO2 22 21 - 32 mmol/L    Anion Gap 14 3 - 16    Glucose 197 (H) 70 - 99 mg/dL    BUN 22 (H) 7 - 20 mg/dL    CREATININE 0.9 0.6 - 1.2 mg/dL    GFR Non-African American >60 >60    GFR African American >60 >60    Calcium 8.6 8.3 - 10.6 mg/dL    Total Protein 6.9 6.4 - 8.2 g/dL    Albumin 3.8 3.4 - 5.0 g/dL    Albumin/Globulin Ratio 1.2 1.1 - 2.2    Total Bilirubin 1.0 0.0 - 1.0 mg/dL    Alkaline Phosphatase 160 (H) 40 - 129 U/L    ALT 18 10 - 40 U/L    AST 21 15 - 37 U/L   Troponin   Result Value Ref Range    Troponin <0.01 <0.01 ng/mL   Brain Natriuretic Peptide   Result Value Ref Range    Pro- 0 - 449 pg/mL   Urinalysis Reflex to Culture    Specimen: Urine, clean catch   Result Value Ref Range    Color, UA Yellow Straw/Yellow    Clarity, UA SL CLOUDY (A) Clear    Glucose, Ur Negative Negative mg/dL    Bilirubin Urine Negative Negative    Ketones, Urine Negative Negative mg/dL    Specific Gravity, UA >=1.030 1.005 - 1.030    Blood, Urine MODERATE (A) Negative    pH, UA 6.0 5.0 - 8.0    Protein, UA 30 (A) Negative mg/dL    Urobilinogen, Urine 1.0 <2.0 E.U./dL    Nitrite, Urine Negative Negative    Leukocyte Esterase, Urine Negative Negative    Microscopic Examination YES     Urine Type NotGiven     Urine Reflex to Culture Not Indicated    Lactic Acid, Plasma   Result Value Ref Range    Lactic Acid 2.2 (H) 0.4 - 2.0 mmol/L   Blood Gas, Venous   Result Value Ref Range    pH, Boni 7.358 7.350 - 7.450    pCO2, Boni 44.6 41.0 - 51.0 mmHg    pO2, Boni 157.0 (H) 25.0 - 40.0 mmHg    HCO3, Venous 25.1 24.0 - 28.0 mmol/L    Base Excess, Boni -0.7 -2.0 - 3.0 mmol/L    O2 Sat, Boni 99 Not established %    Carboxyhemoglobin 0.8 0.0 - 1.5 %    MetHgb, Boni <0.0 0.0 - 1.5 %    TC02 (Calc), Boni 27 mmol/L   Microscopic Urinalysis   Result Value Ref Range    Hyaline Casts, UA 21-40 (A) 0 - 2 /LPF    Mucus, UA 4+ (A) None Seen /LPF    WBC, UA 3-5 0 - 5 /HPF    RBC, UA 0-2 0 - 4 /HPF    Epithelial Cells, UA 0-1 0 - 5 /HPF    Amorphous, UA 2+ /HPF   Magnesium   Result Value Ref Range    Magnesium 2.10 1.80 - 2.40 mg/dL   EKG 12 Lead   Result Value Ref Range    Ventricular Rate 71 BPM    Atrial Rate 71 BPM    P-R Interval 168 ms    QRS Duration 78 ms    Q-T Interval 424 ms    QTc Calculation (Bazett) 460 ms    P Axis 57 degrees    R Axis 74 degrees    T Axis 90 degrees    Diagnosis       EKG performed in ER and to be interpreted by ER physician. Confirmed by MD, ER ([id number]),  Darlene Brody (LUDA)WALKER ([id number]) on 11/17/2021 9:58:05 AM     RECENT VITALS:  BP: (!) 159/70,Temp: 97.4 °F (36.3 °C), Pulse: 98, Resp: 15, SpO2: 97 %       ED Course     Nursing Notes, Past Medical Hx, Past Surgical Hx, Social Hx,Allergies, and Family Hx were reviewed. patient was given the following medications:  Orders Placed This Encounter   Medications    lactated ringers bolus    0.9 % sodium chloride infusion       CONSULTS:  IP CONSULT TO HOSPITALIST    MEDICAL DECISIONMAKING / ASSESSMENT / Averycorine Duenas is a [de-identified] y.o. female presenting with a syncopal episode at home. The patient according to her daughter who is here with her has not quite fully returned to baseline and has been increasingly somnolent over the last couple of weeks. Her episode today was very similar to an episode 2 years ago in [place] for which no definitive etiology was ever found. Given that the patient has not fully returned to baseline and there is no current explanation for her event, she will be brought into the hospital for further monitoring and potential additional work-up. Initial work-up here does not demonstrate any significant abnormality that would account for her syncopal episode. Head CT, chest x-ray, labs, and urinalysis are not revealing any significant findings at this time. Clinical Impression     1.  Syncope and collapse        Disposition     DISPOSITION Admitted 11/17/2021 12:22:24 PM       Ronn Barrios MD  11/17/21 1210

## 2021-11-17 NOTE — ED TRIAGE NOTES
EMS reports syncope in the shower this morning. Pt denies pain but states hasn't felt good since last night. Doesn't provide specifics. Received Zofran en route. Glucose 170.  VSS

## 2021-11-17 NOTE — ED NOTES
Clinical Pharmacy Progress Note  Medication History     Admit Date: 11/17/2021    List of of current medications patient is taking is complete. Home Medication list in EPIC updated to reflect changes noted below. Source of information: Daughter Claudene Grime, pharmacy fills    Patient's home pharmacy: [facility]      Changes made to medication list:   Medications removed:    Ondansetron ODT 4mg prn -- one time course that is now complete  Other notes:    Patient takes all her medications at Bedtime. Please call with any questions.   Isidro WhittakerD., [facility]   11/17/2021 11:20 AM  Wireless: [phone]

## 2021-11-17 NOTE — PROGRESS NOTES
Pt arrives from ED with daughter at bedside. Pt is in no acute distress; vss. Pt and daughter understand plan of care and call light usage. Will continue to monitor.

## 2021-11-18 VITALS
HEART RATE: 94 BPM | WEIGHT: 184 LBS | TEMPERATURE: 98.5 F | HEIGHT: 67 IN | OXYGEN SATURATION: 96 % | DIASTOLIC BLOOD PRESSURE: 63 MMHG | SYSTOLIC BLOOD PRESSURE: 133 MMHG | BODY MASS INDEX: 28.88 KG/M2 | RESPIRATION RATE: 18 BRPM

## 2021-11-18 LAB
ANION GAP SERPL CALCULATED.3IONS-SCNC: 11 MMOL/L (ref 3–16)
BASOPHILS ABSOLUTE: 0 K/UL (ref 0–0.2)
BASOPHILS RELATIVE PERCENT: 0.5 %
BUN BLDV-MCNC: 16 MG/DL (ref 7–20)
CALCIUM SERPL-MCNC: 8.7 MG/DL (ref 8.3–10.6)
CHLORIDE BLD-SCNC: 103 MMOL/L (ref 99–110)
CO2: 26 MMOL/L (ref 21–32)
CREAT SERPL-MCNC: 0.8 MG/DL (ref 0.6–1.2)
EOSINOPHILS ABSOLUTE: 0.1 K/UL (ref 0–0.6)
EOSINOPHILS RELATIVE PERCENT: 1.3 %
GFR AFRICAN AMERICAN: >60
GFR NON-AFRICAN AMERICAN: >60
GLUCOSE BLD-MCNC: 118 MG/DL (ref 70–99)
HCT VFR BLD CALC: 41.6 % (ref 36–48)
HEMOGLOBIN: 13.7 G/DL (ref 12–16)
LYMPHOCYTES ABSOLUTE: 0.9 K/UL (ref 1–5.1)
LYMPHOCYTES RELATIVE PERCENT: 14.9 %
MCH RBC QN AUTO: 29.3 PG (ref 26–34)
MCHC RBC AUTO-ENTMCNC: 33.1 G/DL (ref 31–36)
MCV RBC AUTO: 88.5 FL (ref 80–100)
MONOCYTES ABSOLUTE: 0.5 K/UL (ref 0–1.3)
MONOCYTES RELATIVE PERCENT: 7.9 %
NEUTROPHILS ABSOLUTE: 4.5 K/UL (ref 1.7–7.7)
NEUTROPHILS RELATIVE PERCENT: 75.4 %
PDW BLD-RTO: 14.8 % (ref 12.4–15.4)
PLATELET # BLD: 259 K/UL (ref 135–450)
PMV BLD AUTO: 8.1 FL (ref 5–10.5)
POTASSIUM REFLEX MAGNESIUM: 3.9 MMOL/L (ref 3.5–5.1)
RBC # BLD: 4.7 M/UL (ref 4–5.2)
SODIUM BLD-SCNC: 140 MMOL/L (ref 136–145)
WBC # BLD: 6 K/UL (ref 4–11)

## 2021-11-18 PROCEDURE — 97535 SELF CARE MNGMENT TRAINING: CPT

## 2021-11-18 PROCEDURE — 97166 OT EVAL MOD COMPLEX 45 MIN: CPT

## 2021-11-18 PROCEDURE — G0378 HOSPITAL OBSERVATION PER HR: HCPCS

## 2021-11-18 PROCEDURE — 80048 BASIC METABOLIC PNL TOTAL CA: CPT

## 2021-11-18 PROCEDURE — 36415 COLL VENOUS BLD VENIPUNCTURE: CPT

## 2021-11-18 PROCEDURE — 2580000003 HC RX 258: Performed by: INTERNAL MEDICINE

## 2021-11-18 PROCEDURE — 96372 THER/PROPH/DIAG INJ SC/IM: CPT

## 2021-11-18 PROCEDURE — 97116 GAIT TRAINING THERAPY: CPT

## 2021-11-18 PROCEDURE — 97530 THERAPEUTIC ACTIVITIES: CPT

## 2021-11-18 PROCEDURE — 85025 COMPLETE CBC W/AUTO DIFF WBC: CPT

## 2021-11-18 PROCEDURE — 6360000002 HC RX W HCPCS: Performed by: INTERNAL MEDICINE

## 2021-11-18 PROCEDURE — 97162 PT EVAL MOD COMPLEX 30 MIN: CPT

## 2021-11-18 RX ADMIN — ENOXAPARIN SODIUM 40 MG: 100 INJECTION SUBCUTANEOUS at 09:31

## 2021-11-18 RX ADMIN — SODIUM CHLORIDE, PRESERVATIVE FREE 10 ML: 5 INJECTION INTRAVENOUS at 09:31

## 2021-11-18 RX ADMIN — MICONAZOLE NITRATE: 20 POWDER TOPICAL at 09:31

## 2021-11-18 ASSESSMENT — PAIN SCALES - GENERAL
PAINLEVEL_OUTOF10: 0
PAINLEVEL_OUTOF10: 0

## 2021-11-18 NOTE — PLAN OF CARE
Problem: Falls - Risk of:  Goal: Will remain free from falls  Description: Will remain free from falls  11/18/2021 1445 by Karine Goldstein RN  Outcome: Completed  11/18/2021 1233 by Karine Goldstein RN  Outcome: Ongoing  Note: Patient high fall risk. Fall precautions in place. 11/18/2021 0500 by Rachel Harden RN  Outcome: Ongoing  Note: Fall precautions in place. Pt educated on importance of using bed exit alarm. Bed locked in lowest position with side rails up x3. Bed exit alarm in use. Nonskid footwear in use. Pt educated on call light system and instructed to use call light prior to getting up. No attempts to get OOB without assistance. Call light and personal belongings within reach. Ambulates w/ walker at baseline. Hourly rounding in place. Goal: Absence of physical injury  Description: Absence of physical injury  11/18/2021 1445 by Karine Goldstein RN  Outcome: Completed  11/18/2021 1233 by Karine Goldsetin RN  Outcome: Ongoing     Problem: Falls - Risk of:  Goal: Absence of physical injury  Description: Absence of physical injury  11/18/2021 1445 by Karine Goldstein RN  Outcome: Completed  11/18/2021 1233 by Karine Goldstein RN  Outcome: Ongoing     Problem: Infection:  Goal: Will remain free from infection  Description: Will remain free from infection  11/18/2021 1445 by Karine Goldstein RN  Outcome: Completed  11/18/2021 1233 by Karine Goldstein RN  Outcome: Ongoing  11/18/2021 0500 by Rachel Harden RN  Outcome: Ongoing  Note: WBC within normal range. Afebrile. No signs of infection noted. Will continue to monitor.       Problem: Safety:  Goal: Free from accidental physical injury  Description: Free from accidental physical injury  11/18/2021 1445 by Karine Goldstein RN  Outcome: Completed  11/18/2021 1233 by Karine Goldstein RN  Outcome: Ongoing  Goal: Free from intentional harm  Description: Free from intentional harm  11/18/2021 1445 by Karine Goldstein RN  Outcome: Completed  11/18/2021 1233 by Faviola Cross RN  Outcome: Ongoing     Problem: Skin Integrity:  Goal: Absence of new skin breakdown  Description: Absence of new skin breakdown  11/18/2021 1445 by Faviola Cross RN  Outcome: Completed  11/18/2021 1233 by Faviola Cross RN  Outcome: Ongoing     Problem: Skin Integrity:  Goal: Will show no infection signs and symptoms  Description: Will show no infection signs and symptoms  11/18/2021 1445 by Faviola Cross RN  Outcome: Completed  11/18/2021 1233 by Faviola Cross RN  Outcome: Ongoing  Goal: Absence of new skin breakdown  Description: Absence of new skin breakdown  11/18/2021 1445 by Faviola Cross RN  Outcome: Completed  11/18/2021 1233 by Faviola Cross RN  Outcome: Ongoing     Problem: Discharge Planning:  Goal: Patients continuum of care needs are met  Description: Patients continuum of care needs are met  11/18/2021 1445 by Faviola Cross RN  Outcome: Completed  11/18/2021 1233 by Faviola Cross RN  Outcome: Ongoing     Problem: Skin Integrity:  Goal: Skin integrity will stabilize  Description: Skin integrity will stabilize  11/18/2021 1445 by Faviola Cross RN  Outcome: Completed  11/18/2021 1233 by Faviola Cross RN  Outcome: Ongoing  11/18/2021 0500 by Doris Hull RN  Outcome: Ongoing  Note: Pt has redness/excoriation to abd folds and groin - miconazole powder applied. Pt has some redness to bottom. Turns and repositions self but frequent repositioning reinforced as needed. Purewick in place for moisture prevention. Incontinence care provided. Heels elevated with pillow support. No new signs of skin breakdown needed. Will continue to monitor.

## 2021-11-18 NOTE — PLAN OF CARE
Problem: Skin Integrity:  Goal: Skin integrity will stabilize  Description: Skin integrity will stabilize  Outcome: Ongoing  Note: Pt has redness/excoriation to abd folds and groin - miconazole powder applied. Pt has some redness to bottom. Turns and repositions self but frequent repositioning reinforced as needed. Purewick in place for moisture prevention. Incontinence care provided. Heels elevated with pillow support. No new signs of skin breakdown needed. Will continue to monitor. Problem: Infection:  Goal: Will remain free from infection  Description: Will remain free from infection  Outcome: Ongoing  Note: WBC within normal range. Afebrile. No signs of infection noted. Will continue to monitor. Problem: Pain:  Goal: Patient's pain/discomfort is manageable  Description: Patient's pain/discomfort is manageable  Outcome: Ongoing  Note: Denies pain. Will continue to monitor and reassess pain. Problem: Falls - Risk of:  Goal: Will remain free from falls  Description: Will remain free from falls  Outcome: Ongoing  Note: Fall precautions in place. Pt educated on importance of using bed exit alarm. Bed locked in lowest position with side rails up x3. Bed exit alarm in use. Nonskid footwear in use. Pt educated on call light system and instructed to use call light prior to getting up. No attempts to get OOB without assistance. Call light and personal belongings within reach. Ambulates w/ walker at baseline. Hourly rounding in place.

## 2021-11-18 NOTE — PROGRESS NOTES
Physical Therapy    Facility/Department: [address]  Initial Assessment and Treatment    NAME: Asad Andrea  : 1940  MRN: [id number]    Date of Service: 2021    Discharge Recommendations:  Asad Andrea scored a 20/24 on the AM-PAC short mobility form. Current research shows that an AM-PAC score of 18 or greater is typically associated with a discharge to the patient's home setting. Based on the patient's AM-PAC score and their current functional mobility deficits, it is recommended that the patient have 2-3 sessions per week of Physical Therapy at d/c to increase the patient's independence. At this time, this patient demonstrates the endurance and safety to discharge home with 24hr assist.  Please see assessment section for further patient specific details. PT Equipment Recommendations  Equipment Needed: Yes (Rolling walker)    Assessment   Assessment: Pt from home with syncope and collapse. Pt with history for dementia and PLOF unknown. Pt demonstrates transfers and gait likely close to functional baseline. Balance improved with use of rolling walker. Recommend rolling walker for home if pt doesn't already have. Pt would benefit from 24hr supervision due to baseline cognition. Will follow. Treatment Diagnosis: Decreased gait associated with syncope and collapse. Decision Making: Medium Complexity  Patient Education: Role of PT. No learning due to baseline cognition. REQUIRES PT FOLLOW UP: Yes         Restrictions  Up with assist     Vision/Hearing  Vision: Within Functional Limits  Hearing: Within functional limits       Subjective  Chart Reviewed: Yes  Additional Pertinent Hx: Pt to ED  with fall in shower and admitted for syncope and collpase. Head CT (-). CXR (-). PMH:  HTN, anxiety, dementia  Diagnosis: Syncope and Collapse    Subjective  Subjective: Pt found supine in bed. Pt pleasantly confused, agreeable for PT.   Pain Screening  Patient Currently in Pain: Denies    Orientation  Impaired  Orientation Level: Oriented to person; Disoriented to time; Disoriented to place; Disoriented to situation    Social/Functional History  Lives With: Alone  Type of Home: Apartment  Additional Comments: Pt is a poor historian. Per RN, pt lives at home with daughter. Objective  Strength  Strength RLE: WFL  Strength LLE: WFL    Bed mobility  Supine to Sit: Modified independent (bed flat, using rail)     Transfers  Sit to Stand: Stand by assistance  Stand to sit: Stand by assistance     Ambulation  Device: Rolling Walker  Assistance: Contact guard assistance  Gait Deviations: Decreased step length; Decreased step height; Slow Ayala  Distance: 25ft  Comments: Pt also ambulated 10ft x2 with hand held assistance Min A, unsteady and hesitant gait.     Balance  Sitting - Static: Good  Sitting - Dynamic: Good  Standing - Static: Fair  Standing - Dynamic: Fair (using rolling walker)    Treatment included gait and transfer training with patient education     Plan   Times per week: 2-5  Current Treatment Recommendations: Transfer Training, Gait Training, Strengthening    Safety Devices  Type of devices: Left in chair, Chair alarm in place, Call light within reach, Nurse notified      AM-PAC Score  -PAC Inpatient Mobility Raw Score : 20 (11/18/21 0939)  AM-PAC Inpatient T-Scale Score : 47.67 (11/18/21 0939)  Mobility Inpatient CMS 0-100% Score: 35.83 (11/18/21 0939)  Mobility Inpatient CMS G-Code Modifier : [place] (11/18/21 0939)    Goals  Short term goals  Time Frame for Short term goals: Discharge  Short term goal 1: sit <> stand modified independent  Short term goal 2: ambulate 150ft with rolling walker SBA  Patient Goals   Patient goals : Unable to state       Therapy Time   Individual Concurrent Group Co-treatment   Time In 0830         Time Out 0910         Minutes 40         Timed Code Treatment Minutes:  25  Total Treatment Minutes:  Tello Sharp, PT

## 2021-11-18 NOTE — PROGRESS NOTES
Occupational Therapy   Occupational Therapy Initial Assessment and Treatment  Date: 2021   Patient Name: Radha Ocasio  MRN: [id number]     : 1940    Date of Service: 2021    Discharge Recommendations:  Radha Ocasio scored a 18/24 on the AM-PAC ADL Inpatient form. Current research shows that an AM-PAC score of 18 or greater is typically associated with a discharge to the patient's home setting. Based on the patient's AM-PAC score, and their current ADL deficits, it is recommended that the patient have 2-3 sessions per week of Occupational Therapy at d/c to increase the patient's independence. At this time, this patient demonstrates the endurance and safety to discharge home with home services and a follow up treatment frequency of 2-3x/wk. Please see assessment section for further patient specific details. If patient discharges prior to next session this note will serve as a discharge summary. Please see below for the latest assessment towards goals. OT Equipment Recommendations  Equipment Needed: Yes  Mobility Devices: ADL Assistive Devices  ADL Assistive Devices: Shower Chair with back    Assessment   Performance deficits / Impairments: Decreased functional mobility ; Decreased ADL status  Assessment: Per chart, pt admitted from home s/p syncope and collapse in shower. Pt is a poor historian (baseline dementia) and unable to provide any meaningful history. Per nurse, pt from home w/ dtr. Likely close to functional baseline. Recommend 24 hr A 2* cognition. Will continue to follow during admission to maximize safety and independence. Recommend shower chair w/ back for increased safety (if pt does not currenlty own).   Treatment Diagnosis: impaired ADLs/transfers  OT Education: OT Role; Plan of Care  Patient Education: no learning  Barriers to Learning: baseline dementia  REQUIRES OT FOLLOW UP: Yes  Activity Tolerance  Activity Tolerance: Patient Tolerated treatment well  Safety Devices  Safety Devices in place: Yes  Type of devices: Left in chair; Nurse notified; Call light within reach; Chair alarm in place           Patient Diagnosis(es): The encounter diagnosis was Syncope and collapse.     has a past medical history of Anxiety, Dementia ([address] Hyperlipidemia, and Hypertension. has no past surgical history on file. Treatment Diagnosis: impaired ADLs/transfers      Restrictions  Position Activity Restriction  Other position/activity restrictions: Up with assist    Subjective   General  Chart Reviewed: Yes  Additional Pertinent Hx: [de-identified] y.o. F who presents from home because of a possible syncopal episode. Hospital Course: CT Head: (-); CXR: (-). PMH: HTN, Hyperlipidemia, Anxiety, Dementia. Family / Caregiver Present: No  Referring Practitioner: Dr. Juliana Mock  Diagnosis: Syncope and Collapse    Subjective  Subjective: In bed on entry. Oriented to self and location only. Poor historian - cannot recall if she uses a walker or not. Unsure of who she lives with. Patient Currently in Pain: Denies      Social/Functional History  Social/Functional History  Lives With: Alone  Type of Home: Apartment  Additional Comments: Pt is a poor historian. Per RN, pt lives at home with daughter. Objective   Vision: Within Functional Limits (glassess)  Hearing: Within functional limits      Orientation  Overall Orientation Status: Within Functional Limits        Balance  Sitting Balance: Supervision  Standing Balance:  (SBA - static, CGA - clothing management in standing)    Functional Mobility  Functional - Mobility Device: Rolling Walker  Activity: To/from bathroom  Assist Level: Contact guard assistance    Toilet Transfers  Toilet - Technique: Ambulating  Equipment Used: Standard toilet (w/ bar)  Toilet Transfer:  (CGA stand-sit, SBA sit-stand)    ADL  Grooming: Contact guard assistance (hand hygiene at sink level)  LE Dressing:  Moderate assistance (assist to thread both feet into depends, CGA pullup in standing; Max A socks)  Toileting: Contact guard assistance           Bed mobility  Supine to Sit: Modified independent (bed flat, using rail)     Transfers  Sit to stand: Stand by assistance  Stand to sit: Stand by assistance        Cognition  Overall Cognitive Status: Exceptions  Following Commands: Follows one step commands with repetition  Memory: Decreased short term memory; Decreased recall of recent events; Decreased recall of precautions; Decreased recall of biographical Information; Decreased long term memory  Safety Judgement: Decreased awareness of need for assistance; Decreased awareness of need for safety  Insights: Decreased awareness of deficits  Cognition Comment: baseline dementia                    LUE AROM (degrees)  LUE AROM : WFL  Left Hand AROM (degrees)  Left Hand AROM: WFL  RUE AROM (degrees)  RUE AROM : WFL  Right Hand AROM (degrees)  Right Hand AROM: WFL                  Pt seen by OT for eval and treat.  Treatment included: bed mobility, functional transfer/mobility, ADL         Plan   Plan  Times per week: 2-5  Times per day: Daily  Current Treatment Recommendations: Functional Mobility Training, Safety Education & Training, Self-Care / ADL                                                      AM-PAC Score        AM-[facility] Inpatient Daily Activity Raw Score: 18 (11/18/21 0941)  AM-PAC Inpatient ADL T-Scale Score : 38.66 (11/18/21 0941)  ADL Inpatient CMS 0-100% Score: 46.65 (11/18/21 0941)  ADL Inpatient CMS G-Code Modifier : CK (11/18/21 0941)    Goals  Short term goals  Time Frame for Short term goals: Discharge  Short term goal 1: LB dressing w/ CGA  Short term goal 2: functional mobility to/from toilet w/ SBA  Short term goal 3: toilet transfer w/ Supervision  Short term goal 4: grooming activity in standing at sink level, Supervision  Patient Goals   Patient goals : no goal stated       Therapy Time   Individual Concurrent Group Co-treatment   Time In 0830         Time Out

## 2021-11-18 NOTE — CARE COORDINATION
manager or ;  or  CANNOT provide pharmacy voucher for patients co-pays  5. Patients can then  the prescription on their way out of the hospital at discharge, or pharmacy can deliver to the bedside if staff is available. (payment due at time of pick-up or delivery - cash, check, or card accepted)     Able to afford home medications/ co-pay costs: Yes    ADLS  Support Systems: Children, Family Members    PT AM-PAC:   OT AM-PAC:     [place]: ranch  Steps: 1 garage use    Plans to RETURN to current housing: Yes  Barriers to RETURNING to current housing: medical complications    Home Care Information  Currently ACTIVE with  [employer]: No  Home Care Agency:  [facility] [address] [facility]  Phone: [phone]  Fax: [phone]    Currently ACTIVE with [place] [facility]: No  Gave daughter, Toyb Sosa, info to call     Durable Medical Equipment  DME Provider: n/a  Equipment: Rolling Walker  DISCHARGE PLAN:  Disposition: Home with  [address]: [facility] for discharge: family     Factors facilitating achievement of predicted outcomes: Family support and Cooperative    Barriers to discharge: Medical complications    Additional Case Management Notes: The daughter, Toby Sosa, has no idea why her mom passed out and slept for so long. Gave her Marly's (patient's [facility] RN) phone number and did intake for with daughter. Patient lives with daughter and moved from [address] [place] [address]. Gave [facility] reference for her to call for services.  Daughter wants     The Plan for Transition of Care is related to the following treatment goals of Syncope and collapse [R55]    The Patient and/or patient representative Niki Olvera and her family were provided with a choice of provider and agrees with the discharge plan Yes    Freedom of choice list was provided with basic dialogue that supports the patient's individualized plan of care/goals and shares the quality data associated with the providers.  Yes    Care Transition patient: No    Eduardo Hernandez RN  [facility]  Case Management Department  Ph: [phone]

## 2021-11-18 NOTE — PROGRESS NOTES
Patient discharged home with [place] [id number]. IV removed. Prescription sent to patient pharmacy. Paperwork discussed with patient family member. Denies any questions. Wheeled patient down to family vehicle.

## 2021-11-18 NOTE — DISCHARGE INSTR - COC
Continuity of Care Form    Patient Name: Radha Ocasio   :  [birth year]  MRN:  [id number]    Admit date:  2021  Discharge date:  ***    Code Status Order: Full Code   Advance Directives:      Admitting Physician:  Germaine Chavez MD  PCP: Dharmesh Pettit DO    Discharging Nurse: [facility] Unit/Room#: 1413/7874-58  Discharging Unit Phone Number: ***    Emergency Contact:   Extended Emergency Contact Information  Primary Emergency Contact: Jacoby Jackson Phone: [phone]  Relation: Child    Past Surgical History:  History reviewed. No pertinent surgical history.     Immunization History:   Immunization History   Administered Date(s) Administered    COVID-19, Pfizer, PF, 30mcg/0.3mL 2021, 04/15/2021    Zoster Live (Zostavax) 10/01/2016    Zoster Recombinant (Shingrix) 2011, 2011       Active Problems:  Patient Active Problem List   Diagnosis Code    Anxiety disorder F41.9    HTN (hypertension) I10    Insomnia G47.00    Low back pain M54.50    Mixed hyperlipidemia E78.2    PAD (peripheral artery disease) ([facility] [address] I73.9    Renal failure N19    Urinary incontinence R32    Over weight E66.3    Hx of renal failure Z87.448    Prediabetes R73.03    Microscopic hematuria R31.29    Syncope and collapse R55       Isolation/Infection:   Isolation            No Isolation          Patient Infection Status       Infection Onset Added Last Indicated Last Indicated By Review Planned Expiration Resolved Resolved By    None active    Resolved    COVID-19 (Rule Out) 10/22/21 10/22/21 10/22/21 COVID-19 (Ordered)   10/23/21 Rule-Out Test Resulted            Nurse Assessment:  Last Vital Signs: /63   Pulse 94   Temp 98.5 °F (36.9 °C) (Oral)   Resp 18   Ht 5' 7\" (1.702 m)   Wt 184 lb (83.5 kg)   SpO2 96%   BMI 28.82 kg/m²     Last documented pain score (0-10 scale): Pain Level: 0  Last Weight:   Wt Readings from Last 1 Encounters:   21 184 lb (83.5 kg)     Mental Status: disoriented and alert    IV Access:  - None    Nursing Mobility/ADLs:  Walking   Assisted  Transfer  Assisted  Bathing  Assisted  Dressing  Assisted  Toileting  Assisted  Feeding  Independent  Med [place] Dis[place]  Independent  Med Delivery   whole    Wound Care Documentation and Therapy:        Elimination:  Continence: Bowel: Yes & No  Bladder: Yes & No  Urinary Catheter: None   Colostomy/Ileostomy/Ileal Conduit: No    Date of Last BM: ***    Intake/Output Summary (Last 24 hours) at 11/18/2021 1026  Last data filed at 11/17/2021 2343  Gross per 24 hour   Intake 240 ml   Output --   Net 240 ml     I/O last 3 completed shifts: In: 742.2 [P.O.:240; IV Piggyback:502.2]  Out: -     Safety Concerns: At Risk for Falls    Impairments/Disabilities:      None    Nutrition Therapy:  Current Nutrition Therapy:   - Oral Diet:  General    Routes of Feeding: Oral  Liquids: Thin  Daily Fluid Restriction: no  Last Modified Barium Swallow with Video (Video Swallowing Test): not done    Treatments at the Time of Hospital Discharge:   Respiratory Treatments: ***  Oxygen Therapy:  is not on home oxygen therapy. Ventilator:    - No ventilator support    Rehab Therapies: PT/OT/SN/MSW  Weight Bearing Status/Restrictions: No weight bearing restirctions  Other Medical Equipment (for information only, NOT a DME order):  Walker  Other Treatments: ***    Patient's personal belongings (please select all that are sent with patient):  None    PREFERS:   [address] [place] [address], [place], [address] [phone]    RN SIGNATURE:  Electronically signed by Adam Melendez RN on 11/18/21 at 10:28 AM EST    CASE MANAGEMENT/SOCIAL WORK SECTION    Inpatient Status Date: 11-17-21    Readmission Risk Assessment Score:  Readmission Risk              Risk of Unplanned Readmission:  11           Discharging to Facility/ [address] [address]         Phone: [phone]       Fax:

## 2021-11-18 NOTE — DISCHARGE SUMMARY
Hospitalist Discharge Summary    Patient ID:  Malina Triplett  [id number]  76 y.o.  1940    Admit date: 11/17/2021    Discharge date: 11/18/2021    Disposition: home    Admission Diagnoses:   Patient Active Problem List   Diagnosis    Anxiety disorder    HTN (hypertension)    Insomnia    Low back pain    Mixed hyperlipidemia    PAD (peripheral artery disease) ([facility] [address]    Renal failure    Urinary incontinence    Over weight    Hx of renal failure    Prediabetes    Microscopic hematuria    Syncope and collapse       Discharge Diagnoses: Active Problems:    Syncope and collapse  Resolved Problems:    * No resolved hospital problems. *      Code Status:  Full Code    Condition:  Stable    Discharge Diet: Diet:  ADULT DIET; Regular    PCP to do list: Follow for improvement of symptoms    Hospital Course: 26-year-old female with past medical history of dementia presented to the hospital due to possible syncopal episode. Patient unable to provide any significant history given her dementia. Most of the history was obtained from chart review. Apparently patient passed out in the shower and fell and hence was brought to the hospital.  Per the daughter patient has been sleeping a lot all day and night recently. Today she was in the shower to get ready for doctor's appointment to be evaluated for a fungal rash in the groin when she called her that she was lightheaded and felt like she was going to pass out. Patient has had similar episodes recently. Disease she was brought to the hospital and was evaluated. On arrival she was noted to be hemodynamically stable. Lab work showed mild hypokalemia but otherwise fairly normal.  Chest x-ray and CT of the head were both within normal limits. Patient was admitted to the hospital for the work-up and management       Patient was admitted to the hospital following problems were addressed    Syncopal episode  -Patient was given IV fluids.   PT and OT assessed her. Ultimately she was discharged home in stable condition  -Echo from 8/2021 showed EF of 55 to 39%, normal diastolic function     Fungal rash  -Continue miconazole     Dementia  -Continue donezepil    Discharge Medications:   Current Discharge Medication List      START taking these medications    Details   miconazole (MICOTIN) 2 % powder Apply topically 2 times daily. Qty: 45 g, Refills: 1           Current Discharge Medication List        Current Discharge Medication List      CONTINUE these medications which have NOT CHANGED    Details   mirtazapine (REMERON) 15 MG tablet TAKE 1 TABLET BY MOUTH EVERY DAY AT NIGHT  Qty: 90 tablet, Refills: 1      donepezil (ARICEPT) 5 MG tablet TAKE 1 TABLET BY MOUTH EVERY DAY AT NIGHT  Qty: 90 tablet, Refills: 1    Associated Diagnoses: Dementia without behavioral disturbance, unspecified dementia type (HCC)      Vibegron (GEMTESA) 75 MG TABS Take 75 mg by mouth nightly       atorvastatin (LIPITOR) 40 MG tablet Take 1 tablet by mouth daily  Qty: 30 tablet, Refills: 3      aspirin 81 MG EC tablet Take 81 mg by mouth nightly            Current Discharge Medication List              Procedures: none    Assessment on Discharge: Stable, improved     Discharge ROS:  A complete review of systems was asked and negative except for none    Discharge Exam:  /63   Pulse 94   Temp 98.5 °F (36.9 °C) (Oral)   Resp 18   Ht 5' 7\" (1.702 m)   Wt 184 lb (83.5 kg)   SpO2 96%   BMI 28.82 kg/m²     Gen: NAD  HEENT: NC/AT, moist mucous membranes, no oropharyngeal erythema or exudate  Neck: supple, trachea midline, no anterior cervical or SC LAD  Heart:  Normal s1/s2, RRR, no murmurs, gallops, or rubs.  no leg edema  Lungs:  CTA bilaterally, no wheeze,no rales or rhonchi, no use of accessory muscles  Abd: bowel sounds present, soft, nontender, nondistended, no masses  Extrem:  No clubbing, cyanosis,  no edema  Skin: no lesion or masses  Psych: Alert and awake  Neuro: grossly intact, moves all four extremities    Pertinent Studies During Hospital Stay:  Radiology:  XR CHEST (2 VW)    Result Date: 11/17/2021  EXAM: PA AND LATERAL CHEST X-RAY INDICATION: AMS, shortness of breath COMPARISON: October 2021 FINDINGS: HEART / MEDIASTINUM: Normal in size. LUNGS/PLEURA: The lungs are clear. BONES / SOFT TISSUES: No acute abnormality. OTHER: None. 1. No acute disease. CT Head WO Contrast    Result Date: 11/17/2021  EXAM: CT HEAD WO CONTRAST INDICATION: AMS, HA; mental status changes COMPARISON: October 2021 TECHNICAL: Axial CT imaging obtained from vertex to skull base. Axial images and multiplanar reformatted images reviewed. Individualized dose optimization technique was used in order to meet ALARA standards for radiation dose reduction. In addition to  vendor specific dose reduction algorithms, the dose reduction techniques vary based on the specific scanner utilized but frequently include automated exposure control, adjustment of the mA and/or kV according to patient size, and use of iterative reconstruction technique. IV Contrast: None. FINDINGS: INTRACRANIAL HEMORRHAGE: None. VENTRICLES: Ventricles and sulci are diffusely prominent. BRAIN PARENCHYMA: No midline shift or mass effect. Moderate patchy white matter low attenuation SKULL: No destructive osseous process or fracture. PARANASAL SINUSES AND MASTOIDS: No acute sinusitis or mastoiditis. ORBITS: Normal. EXTRACRANIAL SOFT TISSUES: Normal.     1. No acute intracranial hemorrhage or mass effect. 2. Moderate cortical atrophy. 3. Moderate patchy white matter disease. CT HEAD WO CONTRAST    Result Date: 10/22/2021  History: Stroke. Fatigue. Urinary tract infection. COMPARISON: None. Technique: Axial unenhanced images were obtained from the base of the skull through the vertex. Individualized dose optimization technique was used in order to meet ALARA standards for radiation dose reduction.   In addition to vendor specific dose reduction algorithms, the dose reduction techniques vary based on the specific scanner utilized but frequently include automated exposure control, adjustment of the mA and/or kV according to patient size, and use of iterative reconstruction technique. FINDINGS: Moderate hypoattenuation in the periventricular white matter consistent with chronic small vessel ischemic disease. Mild global volume loss. No intracranial hemorrhage or mass effect. The ventricles and basal cisterns are normal in size and configuration. Paranasal sinuses and mastoid air cells are clear. 1. No acute intracranial findings. 2. Global volume loss with moderate chronic small vessel ischemic disease in the white matter. CT ABDOMEN PELVIS W IV CONTRAST Additional Contrast? None    Result Date: 10/22/2021  INDICATION: Vomiting and abdominal pain. COMPARISON: None. TECHNIQUE: Helically acquired axial images were obtained through the abdomen and pelvis with multiplanar reformats. Up-to-date CT equipment and radiation dose reduction techniques were employed. IV Contrast: 80 cc Isovue 370 IV Oral Contrast: None FINDINGS: LUNG BASES: Clear. LIVER: A few scattered mostly subcentimeter hepatic cysts with the largest in the upper aspect of the liver measuring 3.8 cm consistent with benign findings. No acute abnormalities of the liver. GALLBLADDER AND BILIARY TREE: No calcified gallstones. No gallbladder distention. No intra- or extrahepatic biliary dilatation. PANCREAS: Normal. SPLEEN: Normal. ADRENAL GLANDS: Normal. KIDNEYS AND URETERS: Normal. No hydronephrosis. URINARY BLADDER: Normal. REPRODUCTIVE ORGANS: No associated masses. Previous hysterectomy. BOWEL: Normal diameter, nonobstructed. Normal appendix. LYMPH NODES: No abnormally enlarged nodes. PERITONEUM/RETROPERITONEUM: No ascites or free air. VESSELS: Aorta normal in caliber. Major abdominal vessels are patent. ABDOMINAL WALL: No acute abnormality. BONES: No acute abnormality.  Severe LEFT SUBCLAVIAN ARTERY: Calcific atherosclerotic plaque at the origin of the left subclavian artery with minimal stenosis which does not appear flow limiting. Left subclavian artery is patent. LEFT VERTEBRAL ARTERY: Normal. LEFT CAROTID ARTERY: Left common carotid artery is patent and normal in caliber. Moderate mixed atherosclerotic plaque at the left carotid bulb resulting in mild stenosis (30% by NASA criteria). The remainder of the left internal carotid artery is patent and normal in caliber. NECK SOFT TISSUES: In the deep aspect of the left parotid gland, there is an enhancing solid circumscribed mass measuring 1.1 cm (series 2 image 260). No cervical lymphadenopathy. The submandibular glands and right parotid gland are normal. The thyroid is normal in size. VISUALIZED MEDIASTINUM: No mass or lymphadenopathy. VISUALIZED LUNG APICES: Groundglass density nodule at the right lung apex measures 7 mm (series 2 image 101). Lung apices are otherwise clear. BONES: Degenerative disc disease in the cervical spine at each level from C4-C5 through C6-C7. 2 mm grade 1 anterolisthesis of C6 on C7. No acute osseous abnormality. OTHER: None. 1. Patent carotid and vertebral arteries. 2. Mild atherosclerotic stenosis of the proximal left internal carotid artery measuring 30% in severity. 3. Mild soft atherosclerotic plaque at the right carotid bulb without stenosis. 4. Incidental note of a 1.1 cm circumscribed solid enhancing mass of the deep aspect of the left parotid gland. This is nonspecific and could be benign or malignant in etiology. 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 ENT consultation recommended for follow-up. 5. 7 mm groundglass density nodule at the right lung apex which is most likely benign or inflammatory in etiology but technically nonspecific. Recommend a follow-up chest CT without contrast in 3 months to evaluate for persistence of the groundglass density.  PROCEDURE: CT ANGIOGRAPHY HEAD WITH/WITHOUT CONTRAST INDICATION: concern for stroke COMPARISON: none TECHNIQUE: Axial CT imaging obtained through the head prior to and following administration of IV contrast. Axial images, multiplanar reformatted images, and maximum intensity projection images were reviewed for CT angiographic technique. IV contrast: 80 mL Omnipaque 350. FINDINGS: ANTERIOR CIRCULATION: The intracranial internal carotid arteries, anterior cerebral arteries, and middle cerebral arteries demonstrate no occlusion or stenosis. No evidence for aneurysm or arteriovenous malformation. POSTERIOR CIRCULATION: The bilateral vertebral arteries, basilar artery and posterior cerebral arteries demonstrate no occlusion or stenosis. No evidence for aneurysm or arteriovenous malformation. INTRACRANIAL VENOUS SYSTEM: No evidence for intracranial venous thrombosis. INTRACRANIAL HEMORRHAGE: None. VENTRICLES: Normal in size and configuration for age. BRAIN PARENCHYMA: Mild global volume loss with moderate small vessel ischemic disease in the white matter. SKULL: No destructive osseous process or fracture. PARANASAL SINUSES / MASTOIDS: No acute sinusitis or mastoiditis. ORBITS: Normal. EXTRACRANIAL SOFT TISSUES: Normal. OTHER: None. IMPRESSION: 1. Patent intracranial vessels. CTA HEAD NECK W CONTRAST    Result Date: 10/22/2021  PROCEDURE: CT ANGIOGRAPHY NECK WITH/WITHOUT CONTRAST INDICATION: Stroke. Fatigue. Urinary tract infection. COMPARISON: CT head 10/22/2021. TECHNIQUE: Limited noncontrast CT imaging performed for the purposes of IV contrast bolus tracking. Axial CT imaging obtained from skull base through the lung apices following administration of IV contrast. Axial images, multiplanar reformatted images, and maximum intensity projection images were reviewed for CT angiographic technique. Individualized dose optimization technique was used in order to meet ALARA standards for radiation dose reduction.   In addition to vendor specific dose reduction algorithms, the dose reduction techniques vary based on the specific scanner utilized but frequently include automated exposure control, adjustment of the mA and/or kV according to patient size, and use of iterative reconstruction technique. NASCET criteria used to determine percent stenosis measurements (% stenosis = (1-narrowest diameter/diameter of normal distal segment)x100). IV contrast: 80 mL Isovue-370. FINDINGS: AORTIC ARCH: Standard three-vessel aortic arch anatomy is present. INNOMINATE ARTERY: Normal. RIGHT SUBCLAVIAN ARTERY: Normal. RIGHT VERTEBRAL ARTERY: Normal. RIGHT CAROTID ARTERY: The right common carotid, external carotid, and internal carotid arteries are patent and without stenosis. Mild soft atherosclerotic plaque at the right carotid bulb. LEFT SUBCLAVIAN ARTERY: Calcific atherosclerotic plaque at the origin of the left subclavian artery with minimal stenosis which does not appear flow limiting. Left subclavian artery is patent. LEFT VERTEBRAL ARTERY: Normal. LEFT CAROTID ARTERY: Left common carotid artery is patent and normal in caliber. Moderate mixed atherosclerotic plaque at the left carotid bulb resulting in mild stenosis (30% by NASA criteria). The remainder of the left internal carotid artery is patent and normal in caliber. NECK SOFT TISSUES: In the deep aspect of the left parotid gland, there is an enhancing solid circumscribed mass measuring 1.1 cm (series 2 image 260). No cervical lymphadenopathy. The submandibular glands and right parotid gland are normal. The thyroid is normal in size. VISUALIZED MEDIASTINUM: No mass or lymphadenopathy. VISUALIZED LUNG APICES: Groundglass density nodule at the right lung apex measures 7 mm (series 2 image 101). Lung apices are otherwise clear. BONES: Degenerative disc disease in the cervical spine at each level from C4-C5 through C6-C7. 2 mm grade 1 anterolisthesis of C6 on C7. No acute osseous abnormality. OTHER: None.      1. Patent carotid and vertebral arteries. 2. Mild atherosclerotic stenosis of the proximal left internal carotid artery measuring 30% in severity. 3. Mild soft atherosclerotic plaque at the right carotid bulb without stenosis. 4. Incidental note of a 1.1 cm circumscribed solid enhancing mass of the deep aspect of the left parotid gland. This is nonspecific and could be benign or malignant in etiology. 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 ENT consultation recommended for follow-up. 5. 7 mm groundglass density nodule at the right lung apex which is most likely benign or inflammatory in etiology but technically nonspecific. Recommend a follow-up chest CT without contrast in 3 months to evaluate for persistence of the groundglass density. PROCEDURE: CT ANGIOGRAPHY HEAD WITH/WITHOUT CONTRAST INDICATION: concern for stroke COMPARISON: none TECHNIQUE: Axial CT imaging obtained through the head prior to and following administration of IV contrast. Axial images, multiplanar reformatted images, and maximum intensity projection images were reviewed for CT angiographic technique. IV contrast: 80 mL Omnipaque 350. FINDINGS: ANTERIOR CIRCULATION: The intracranial internal carotid arteries, anterior cerebral arteries, and middle cerebral arteries demonstrate no occlusion or stenosis. No evidence for aneurysm or arteriovenous malformation. POSTERIOR CIRCULATION: The bilateral vertebral arteries, basilar artery and posterior cerebral arteries demonstrate no occlusion or stenosis. No evidence for aneurysm or arteriovenous malformation. INTRACRANIAL VENOUS SYSTEM: No evidence for intracranial venous thrombosis. INTRACRANIAL HEMORRHAGE: None. VENTRICLES: Normal in size and configuration for age. BRAIN PARENCHYMA: Mild global volume loss with moderate small vessel ischemic disease in the white matter. SKULL: No destructive osseous process or fracture. PARANASAL SINUSES / MASTOIDS: No acute sinusitis or mastoiditis. ORBITS: Normal. EXTRACRANIAL SOFT TISSUES: Normal. OTHER: None. IMPRESSION: 1. Patent intracranial vessels. Last Labs on Discharge:     Recent Results (from the past 24 hour(s))   Basic Metabolic Panel w/ Reflex to MG    Collection Time: 11/18/21  8:50 AM   Result Value Ref Range    Sodium 140 136 - 145 mmol/L    Potassium reflex Magnesium 3.9 3.5 - 5.1 mmol/L    Chloride 103 99 - 110 mmol/L    CO2 26 21 - 32 mmol/L    Anion Gap 11 3 - 16    Glucose 118 (H) 70 - 99 mg/dL    BUN 16 7 - 20 mg/dL    CREATININE 0.8 0.6 - 1.2 mg/dL    GFR Non-African American >60 >60    GFR African American >60 >60    Calcium 8.7 8.3 - 10.6 mg/dL   CBC auto differential    Collection Time: 11/18/21  8:50 AM   Result Value Ref Range    WBC 6.0 4.0 - 11.0 K/uL    RBC 4.70 4.00 - 5.20 M/uL    Hemoglobin 13.7 12.0 - 16.0 g/dL    Hematocrit 41.6 36.0 - 48.0 %    MCV 88.5 80.0 - 100.0 fL    MCH 29.3 26.0 - 34.0 pg    MCHC 33.1 31.0 - 36.0 g/dL    RDW 14.8 12.4 - 15.4 %    Platelets 345 086 - 866 K/uL    MPV 8.1 5.0 - 10.5 fL    Neutrophils % 75.4 %    Lymphocytes % 14.9 %    Monocytes % 7.9 %    Eosinophils % 1.3 %    Basophils % 0.5 %    Neutrophils Absolute 4.5 1.7 - 7.7 K/uL    Lymphocytes Absolute 0.9 (L) 1.0 - 5.1 K/uL    Monocytes Absolute 0.5 0.0 - 1.3 K/uL    Eosinophils Absolute 0.1 0.0 - 0.6 K/uL    Basophils Absolute 0.0 0.0 - 0.2 K/uL         Follow up: with Coreen Gutiérrez DO    Note that over 30 minutes was spent in preparing discharge papers, discussing discharge with patient, medication review, etc.    Thank you Anthony Cortez DO for the opportunity to be involved in this patient's care. If you have any questions or concerns please feel free to contact me at [phone].     Electronically signed by [facility], MD on 11/18/2021 at 1:08 PM

## 2021-11-18 NOTE — CARE COORDINATION
[facility]    Spoke with patient regarding [facility] services. Patient aware and agreeable to services.  Faxed orders to [facility] for [facility] by 11/20    Jessa Rothman LPN  Care Transition Nurse  [address]  [phone]

## 2021-11-18 NOTE — H&P
EVERY DAY AT NIGHT 10/11/21  Yes Coreen Gutiérrez, DO   Vibegron (GEMTESA) 75 MG TABS Take 75 mg by mouth nightly    Yes Historical Provider, MD   atorvastatin (LIPITOR) 40 MG tablet Take 1 tablet by mouth daily 8/16/21  Yes Coreen Gutiérrez, DO   aspirin 81 MG EC tablet Take 81 mg by mouth nightly    Yes Historical Provider, MD       Allergies:  Clarithromycin and Sulfamethoxazole-trimethoprim    Social History:       reports that she has never smoked. She has never used smokeless tobacco. She reports that she does not drink alcohol and does not use drugs. Family History:  Reviewed in detail and negative for DM, Early CAD, Cancer, CVA. Positive as follows:    History reviewed. No pertinent family history. REVIEW OF SYSTEMS:   Positive review  noted in the HPI. All other systems reviewed and negative.     PHYSICAL EXAM:    BP (!) 173/73   Pulse 69   Temp 97.8 °F (36.6 °C) (Oral)   Resp 17   Ht 5' 7\" (1.702 m)   Wt 184 lb (83.5 kg)   SpO2 94%   BMI 28.82 kg/m²   General Appearance: Alert oriented x1  Skin: warm and dry, no rash or erythema  Head: normocephalic and atraumatic  Eyes: pupils equal, round, and reactive to light, extraocular eye movements intact, conjunctivae normal  ENT: tympanic membrane, external ear and ear canal normal bilaterally, nose without deformity, nasal mucosa and turbinates normal without polyps  Neck: supple and non-tender without mass, no thyromegaly or thyroid nodules, no cervical lymphadenopathy  Pulmonary/Chest: clear to auscultation bilaterally- no wheezes, rales or rhonchi, normal air movement, no respiratory distress  Cardiovascular: normal rate, regular rhythm, normal S1 and S2, no murmurs, rubs, clicks, or gallops, Peripheral pulses good, Cap refill <3 sec, no carotid bruits  Abdomen: soft, non-tender, non-distended, normal bowel sounds, no masses or organomegaly  Extremities: no cyanosis, clubbing or edema  Musculoskeletal: normal range of motion, no joint swelling, deformity or tenderness  Neurologic: reflexes normal and symmetric, no cranial nerve deficit, gait, coordination and speech normal      LABS:        CBC   Recent Labs     11/17/21  0804   WBC 7.8   HGB 14.5   HCT 43.4         RENAL  Recent Labs     11/17/21  0804      K 3.4*      CO2 22   BUN 22*   CREATININE 0.9     LFT'S  Recent Labs     11/17/21  0804   AST 21   ALT 18   BILITOT 1.0   ALKPHOS 160*     COAG  No results for input(s): INR in the last 72 hours. CARDIAC ENZYMES  Recent Labs     11/17/21  0804   TROPONINI <0.01       U/A:    Lab Results   Component Value Date    NITRITE Negative 10/07/2021    COLORU Yellow 11/17/2021    WBCUA 3-5 11/17/2021    RBCUA 0-2 11/17/2021    MUCUS 4+ 11/17/2021    BACTERIA Rare 10/22/2021    CLARITYU SL CLOUDY 11/17/2021    SPECGRAV >=1.030 11/17/2021    LEUKOCYTESUR Negative 11/17/2021    BLOODU MODERATE 11/17/2021    GLUCOSEU Negative 11/17/2021    AMORPHOUS 2+ 11/17/2021       ABG  No results found for: WMJ9BXM, BEART, Y7OALAGD, PHART, THGBART, JNU8BXK, PO2ART, BEL3AJS    UA:  Recent Labs     11/17/21  0804   COLORU Yellow   PHUR 6.0   WBCUA 3-5   RBCUA 0-2   MUCUS 4+*   CLARITYU SL CLOUDY*   SPECGRAV >=1.030   LEUKOCYTESUR Negative   UROBILINOGEN 1.0   BILIRUBINUR Negative   BLOODU MODERATE*   GLUCOSEU Negative   KETUA Negative   AMORPHOUS 2+       Microbiology:  No results for input(s): LABGRAM, LABANAE, ORG, CXSURG in the last 72 hours. Nasal Culture: No results for input(s): ORG, MRSAPCR in the last 72 hours. Blood Culture: No results for input(s): BC, BLOODCULT2, ORG in the last 72 hours. Fungal Culture:   No results for input(s): FUNGSM in the last 72 hours. No results for input(s): FUNCXBLD in the last 72 hours. CSF Culture:  No results for input(s): COLORCSF, APPEARCSF, CFTUBE, CLOTCSF, WBCCSF, RBCCSF, NEUTCSF, NUMCELLSCSF, LYMPHSCSF, MONOCSF, GLUCCSF, VOLCSF in the last 72 hours.   Respiratory Culture:  No results for input(s): CULTRESP, LABGRAM in the last 72 hours. AFB:No results for input(s): AFBSMEAR in the last 72 hours. Urine Culture  No results for input(s): LABURIN in the last 72 hours. RADIOLOGY:    XR CHEST (2 VW)   Final Result      1. No acute disease. CT Head WO Contrast   Final Result      1. No acute intracranial hemorrhage or mass effect. 2. Moderate cortical atrophy. 3. Moderate patchy white matter disease. Previous medical records personally reviewed and analyzed         PHYSICIAN CERTIFICATION    I certify that El Olmedo is expected to be hospitalized for <2 midnights based on the following assessment and plan:    ASSESSMENT/PLAN:  Active Hospital Problems    Diagnosis Date Noted    Syncope and collapse [R55] 11/17/2021     Syncopal episode  -Check orthostatic vital signs  -Continue IV fluids  -PT/OT evaluation  -Echo from 8/2021 showed EF of 55 to 46%, normal diastolic function    Fungal rash  -Continue miconazole    Dementia  -Continue donezepil    DVT Prophylaxis: lovenox  Diet: ADULT DIET; Regular  Code Status: Full Code  PT/OT Eval Status: consulted    Dispo -pending clinical course       [facility], MD  The note was completed using EMR. Every effort was made to ensure accuracy; however, inadvertent computerized transcription errors may be present. Thank you Toby Rosen DO for the opportunity to be involved in this patient's care. If you have any questions or concerns please feel free to contact me at [phone].

## 2021-11-18 NOTE — PLAN OF CARE
Problem: Falls - Risk of:  Goal: Will remain free from falls  Description: Will remain free from falls  11/18/2021 1233 by Fartun Santiago RN  Outcome: Ongoing  Note: Patient high fall risk. Fall precautions in place. 11/18/2021 0500 by James Woodall RN  Outcome: Ongoing  Note: Fall precautions in place. Pt educated on importance of using bed exit alarm. Bed locked in lowest position with side rails up x3. Bed exit alarm in use. Nonskid footwear in use. Pt educated on call light system and instructed to use call light prior to getting up. No attempts to get OOB without assistance. Call light and personal belongings within reach. Ambulates w/ walker at baseline. Hourly rounding in place. Goal: Absence of physical injury  Description: Absence of physical injury  Outcome: Ongoing     Problem: Infection:  Goal: Will remain free from infection  Description: Will remain free from infection  11/18/2021 1233 by Fartun Santiago RN  Outcome: Ongoing  11/18/2021 0500 by James Woodall RN  Outcome: Ongoing  Note: WBC within normal range. Afebrile. No signs of infection noted. Will continue to monitor.       Problem: Safety:  Goal: Free from accidental physical injury  Description: Free from accidental physical injury  Outcome: Ongoing  Goal: Free from intentional harm  Description: Free from intentional harm  Outcome: Ongoing     Problem: Pain:  Goal: Patient's pain/discomfort is manageable  Description: Patient's pain/discomfort is manageable  11/18/2021 1233 by Fartun Santiago RN  Outcome: Ongoing     Problem: Daily Care:  Goal: Daily care needs are met  Description: Daily care needs are met  Outcome: Ongoing     Problem: Skin Integrity:  Goal: Skin integrity will stabilize  Description: Skin integrity will stabilize  11/18/2021 1233 by Fartun Santiago RN  Outcome: Ongoing     Problem: Discharge Planning:  Goal: Patients continuum of care needs are met  Description: Patients continuum of care needs are met  Outcome: Ongoing     Problem: Skin Integrity:  Goal: Will show no infection signs and symptoms  Description: Will show no infection signs and symptoms  Outcome: Ongoing     Problem: Skin Integrity:  Goal: Absence of new skin breakdown  Description: Absence of new skin breakdown  Outcome: Ongoing

## 2021-11-22 ENCOUNTER — TELEPHONE (OUTPATIENT)
Dept: INTERNAL MEDICINE CLINIC | Age: 81
End: 2021-11-22

## 2021-11-22 NOTE — TELEPHONE ENCOUNTER
Colette Shipley needs Verbal orders for home care for the patient Skilled nursing, PT, OT, Patient Aide,  and speech therapist

## 2021-11-23 ENCOUNTER — TELEPHONE (OUTPATIENT)
Dept: INTERNAL MEDICINE CLINIC | Age: 81
End: 2021-11-23

## 2021-12-01 ENCOUNTER — TELEPHONE (OUTPATIENT)
Dept: INTERNAL MEDICINE CLINIC | Age: 81
End: 2021-12-01

## 2021-12-01 RX ORDER — FLUCONAZOLE 150 MG/1
150 TABLET ORAL ONCE
Qty: 1 TABLET | Refills: 0 | Status: SHIPPED | OUTPATIENT
Start: 2021-12-01 | End: 2021-12-01

## 2021-12-01 NOTE — TELEPHONE ENCOUNTER
Okay to give order for diflucan. She can also try nystatin cream under the breast.  I would like patient to leave urine sample just incase.

## 2021-12-01 NOTE — TELEPHONE ENCOUNTER
Eliseo ordered per Dr Laura Lambert and sent to [facility] on [address].   Note completed

## 2021-12-01 NOTE — TELEPHONE ENCOUNTER
Nikki Antonio from [facility] calling to get orders for Diflucan & Lotrisone cream. Patient is complaining of burning with urination but nurse thinks it is a yeast infection rather than UTI. States patient's vagina and shannon-area is very red as well as under right breast. States patient is miserable. Nurse did leave a cup for patient to provide a urine sample, if the dr wants to check that as well.

## 2021-12-01 NOTE — TELEPHONE ENCOUNTER
Lynette Sommer is returning Aundrea's call. She is needing the prescriptions for Diflucan and Nystatin sent to [facility]ACY #[id number]- [address], [address] [phone]    She you are wanting Dr. Ezekiel Burris to know that the urine will taken tomorrow. Please contact Lynette Sommer with questions/concerns.

## 2021-12-02 ENCOUNTER — HOSPITAL ENCOUNTER (OUTPATIENT)
Age: 81
Setting detail: SPECIMEN
Discharge: HOME | End: 2021-12-02
Payer: MEDICARE

## 2021-12-02 LAB
BACTERIA: ABNORMAL /HPF
BILIRUBIN URINE: NEGATIVE
BLOOD, URINE: ABNORMAL
CLARITY: ABNORMAL
COLOR: ABNORMAL
EPITHELIAL CELLS, UA: 0 /HPF (ref 0–5)
GLUCOSE URINE: NEGATIVE MG/DL
HYALINE CASTS: 7 /LPF (ref 0–8)
KETONES, URINE: NEGATIVE MG/DL
LEUKOCYTE ESTERASE, URINE: ABNORMAL
MICROSCOPIC EXAMINATION: YES
NITRITE, URINE: NEGATIVE
PH UA: 6 (ref 5–8)
PROTEIN UA: 30 MG/DL
RBC UA: ABNORMAL /HPF (ref 0–4)
SPECIFIC GRAVITY UA: 1.02 (ref 1–1.03)
URINE TYPE: ABNORMAL
UROBILINOGEN, URINE: 1 E.U./DL
WBC UA: 73 /HPF (ref 0–5)

## 2021-12-02 PROCEDURE — 87077 CULTURE AEROBIC IDENTIFY: CPT

## 2021-12-02 PROCEDURE — 87088 URINE BACTERIA CULTURE: CPT

## 2021-12-02 PROCEDURE — 87086 URINE CULTURE/COLONY COUNT: CPT

## 2021-12-02 PROCEDURE — 81001 URINALYSIS AUTO W/SCOPE: CPT

## 2021-12-02 PROCEDURE — 87186 SC STD MICRODIL/AGAR DIL: CPT

## 2021-12-03 DIAGNOSIS — N30.01 ACUTE CYSTITIS WITH HEMATURIA: Primary | ICD-10-CM

## 2021-12-03 RX ORDER — NITROFURANTOIN 25; 75 MG/1; MG/1
100 CAPSULE ORAL 2 TIMES DAILY
Qty: 10 CAPSULE | Refills: 0 | Status: SHIPPED | OUTPATIENT
Start: 2021-12-03 | End: 2021-12-08

## 2021-12-05 LAB
ORGANISM: ABNORMAL
ORGANISM: ABNORMAL
URINE CULTURE, ROUTINE: ABNORMAL
URINE CULTURE, ROUTINE: ABNORMAL

## 2021-12-08 ASSESSMENT — LIFESTYLE VARIABLES
AUDIT TOTAL SCORE: INCOMPLETE
HOW OFTEN DO YOU HAVE A DRINK CONTAINING ALCOHOL: NEVER
AUDIT-C TOTAL SCORE: INCOMPLETE
HOW OFTEN DO YOU HAVE A DRINK CONTAINING ALCOHOL: 0

## 2021-12-08 ASSESSMENT — PATIENT HEALTH QUESTIONNAIRE - PHQ9
1. LITTLE INTEREST OR PLEASURE IN DOING THINGS: 0
SUM OF ALL RESPONSES TO PHQ QUESTIONS 1-9: 0
SUM OF ALL RESPONSES TO PHQ9 QUESTIONS 1 & 2: 0
2. FEELING DOWN, DEPRESSED OR HOPELESS: 0

## 2021-12-09 ENCOUNTER — TELEPHONE (OUTPATIENT)
Dept: INTERNAL MEDICINE CLINIC | Age: 81
End: 2021-12-09

## 2021-12-09 RX ORDER — KETOCONAZOLE 20 MG/G
CREAM TOPICAL
Qty: 30 G | Refills: 1 | Status: SHIPPED | OUTPATIENT
Start: 2021-12-09 | End: 2021-12-15 | Stop reason: SDUPTHER

## 2021-12-09 NOTE — TELEPHONE ENCOUNTER
Zuleymacarlcharlie Meléndez her daughter said that she is beet red almost bleeding. No new soap, detergents, perfumes. Daughter will send a mychart with pics. No vaginal discharge. Be on the lookout for pictures.

## 2021-12-09 NOTE — TELEPHONE ENCOUNTER
The visiting nurse states she saw patient today, and her vaginal area is raw and smells very yeasty. She states patient has an appointment with Dr. Maldonado Sheldon next week, and is requesting to know if Diflucan can please be prescribed. [facility]/pharmacy #[id number]- [phone] - [phone]     She provided no additional information and can be reached @ phone # provided.

## 2021-12-09 NOTE — TELEPHONE ENCOUNTER
I believe diflucan was prescribed for patient last week. Did she take it then? If so did symptoms resolve and come back or have they just been consistent?

## 2021-12-13 ENCOUNTER — TELEPHONE (OUTPATIENT)
Dept: INTERNAL MEDICINE CLINIC | Age: 81
End: 2021-12-13

## 2021-12-13 DIAGNOSIS — R31.9 HEMATURIA, UNSPECIFIED TYPE: ICD-10-CM

## 2021-12-13 DIAGNOSIS — R53.83 FATIGUE, UNSPECIFIED TYPE: Primary | ICD-10-CM

## 2021-12-13 NOTE — TELEPHONE ENCOUNTER
Patients daughter called kirstie that she does not think the medication is working for the symptoms because they are not getting any better. Urine still has a strong smell some bleeding in her depends when she gets changed and cleaned up. She would like to get some suggestions before the visit on wensday as that is a AWV.

## 2021-12-13 NOTE — TELEPHONE ENCOUNTER
Spoke with Susanne Cedillo she was advised on holding off using depends if possible and advised blood work and urine has been ordered. I have faxed the orders to [facility] for them to draw ASAP requested tomorrow.

## 2021-12-13 NOTE — TELEPHONE ENCOUNTER
Spoke with Toby Sosa who states the patient was getting better but then the last few days her urine had a terrible odor. She is urinating less then normal as she is not eating or drinking much. Patient did state she is feeling much more fatigued. The blood is coming from the skin not the urine. They took the depends off of a patient for a few days which made the skin irritation better but then they put her back in them the last few nights and the irritation is back. The nurse was bathing the patient this morning and noticed when she  took the depends off there was blood. Cannot see any active bleeding in urine or irritation area. Even when cleaning with wipe.  Daughter is requesting to know if she is able to drop off a urine tomorrow afternoon before her husbands appointment if that is ok with you for us to check for another UTI

## 2021-12-13 NOTE — TELEPHONE ENCOUNTER
Thank you Christian Chopra. Please have them avoid the depends. Okay to do urine studies. Please see if she can bring patient in for lab work via blood 2/2 the fatigue as well. I will place orders. Thank yoU!

## 2021-12-14 LAB
BASOPHILS ABSOLUTE: 0 /ΜL
BASOPHILS RELATIVE PERCENT: 0 %
BILIRUBIN, URINE: NEGATIVE
BILIRUBIN, URINE: NEGATIVE
BLOOD, URINE: POSITIVE
BLOOD, URINE: POSITIVE
BUN BLDV-MCNC: 23 MG/DL
CALCIUM SERPL-MCNC: 9.6 MG/DL
CHLORIDE BLD-SCNC: 101 MMOL/L
CLARITY: NORMAL
CLARITY: NORMAL
CO2: 22 MMOL/L
COLOR: NORMAL
COLOR: NORMAL
CREAT SERPL-MCNC: 1.09 MG/DL
EOSINOPHILS ABSOLUTE: 0.2 /ΜL
EOSINOPHILS RELATIVE PERCENT: 2 %
GFR CALCULATED: 48
GLUCOSE BLD-MCNC: 107 MG/DL
GLUCOSE URINE: NORMAL
GLUCOSE URINE: NORMAL
HCT VFR BLD CALC: 45.8 % (ref 36–46)
HEMOGLOBIN: 15.4 G/DL (ref 12–16)
KETONES, URINE: NEGATIVE
KETONES, URINE: NEGATIVE
LEUKOCYTE ESTERASE, URINE: NEGATIVE
LEUKOCYTE ESTERASE, URINE: NORMAL
LYMPHOCYTES ABSOLUTE: 1.4 /ΜL
LYMPHOCYTES RELATIVE PERCENT: 17 %
MCH RBC QN AUTO: 28.8 PG
MCHC RBC AUTO-ENTMCNC: 33.6 G/DL
MCV RBC AUTO: 86 FL
MONOCYTES ABSOLUTE: 0.7 /ΜL
MONOCYTES RELATIVE PERCENT: 8 %
NEUTROPHILS ABSOLUTE: 6.2 /ΜL
NEUTROPHILS RELATIVE PERCENT: 73 %
NITRITE, URINE: NEGATIVE
NITRITE, URINE: NEGATIVE
PDW BLD-RTO: 14.8 %
PH UA: 6.5 (ref 4.5–8)
PH UA: 6.5 (ref 4.5–8)
PLATELET # BLD: 342 K/ΜL
PMV BLD AUTO: NORMAL FL
POTASSIUM SERPL-SCNC: 3.7 MMOL/L
PROTEIN UA: NEGATIVE
PROTEIN UA: NEGATIVE
RBC # BLD: 5.34 10^6/ΜL
SODIUM BLD-SCNC: 140 MMOL/L
SPECIFIC GRAVITY, URINE: 6.5
SPECIFIC GRAVITY, URINE: 6.5
UROBILINOGEN, URINE: NORMAL
UROBILINOGEN, URINE: NORMAL
WBC # BLD: 8.5 10^3/ML

## 2021-12-15 ENCOUNTER — OFFICE VISIT (OUTPATIENT)
Dept: INTERNAL MEDICINE CLINIC | Age: 81
End: 2021-12-15
Payer: MEDICARE

## 2021-12-15 VITALS
HEIGHT: 67 IN | RESPIRATION RATE: 16 BRPM | DIASTOLIC BLOOD PRESSURE: 70 MMHG | OXYGEN SATURATION: 98 % | BODY MASS INDEX: 27.62 KG/M2 | SYSTOLIC BLOOD PRESSURE: 130 MMHG | WEIGHT: 176 LBS | HEART RATE: 82 BPM

## 2021-12-15 DIAGNOSIS — B37.2 CUTANEOUS CANDIDIASIS: Primary | ICD-10-CM

## 2021-12-15 PROCEDURE — G8400 PT W/DXA NO RESULTS DOC: HCPCS | Performed by: INTERNAL MEDICINE

## 2021-12-15 PROCEDURE — 1036F TOBACCO NON-USER: CPT | Performed by: INTERNAL MEDICINE

## 2021-12-15 PROCEDURE — G8427 DOCREV CUR MEDS BY ELIG CLIN: HCPCS | Performed by: INTERNAL MEDICINE

## 2021-12-15 PROCEDURE — 1123F ACP DISCUSS/DSCN MKR DOCD: CPT | Performed by: INTERNAL MEDICINE

## 2021-12-15 PROCEDURE — 99214 OFFICE O/P EST MOD 30 MIN: CPT | Performed by: INTERNAL MEDICINE

## 2021-12-15 PROCEDURE — G8417 CALC BMI ABV UP PARAM F/U: HCPCS | Performed by: INTERNAL MEDICINE

## 2021-12-15 PROCEDURE — 4040F PNEUMOC VAC/ADMIN/RCVD: CPT | Performed by: INTERNAL MEDICINE

## 2021-12-15 PROCEDURE — G8484 FLU IMMUNIZE NO ADMIN: HCPCS | Performed by: INTERNAL MEDICINE

## 2021-12-15 PROCEDURE — 1090F PRES/ABSN URINE INCON ASSESS: CPT | Performed by: INTERNAL MEDICINE

## 2021-12-15 PROCEDURE — 1111F DSCHRG MED/CURRENT MED MERGE: CPT | Performed by: INTERNAL MEDICINE

## 2021-12-15 RX ORDER — NITROFURANTOIN 25; 75 MG/1; MG/1
100 CAPSULE ORAL 2 TIMES DAILY
Qty: 20 CAPSULE | Refills: 0 | Status: SHIPPED | OUTPATIENT
Start: 2021-12-15 | End: 2021-12-20

## 2021-12-15 RX ORDER — KETOCONAZOLE 20 MG/G
CREAM TOPICAL
Qty: 60 G | Refills: 3 | Status: SHIPPED | OUTPATIENT
Start: 2021-12-15

## 2021-12-15 NOTE — PROGRESS NOTES
Pao Fong (:  1940) is a [de-identified] y.o. female,Established patient, here for evaluation of the following chief complaint(s):  Skin Problem (45 days before she went to the hospital )      Vitals:    12/15/21 1535   BP: 130/70   Pulse: 82   Resp: 16   SpO2: 98%        ASSESSMENT/PLAN:  1. Cutaneous candidiasis  -Continue fluconazole cream  -Eliminate depends completely  -If no improvement will consider wound care  -If no improvement in 1 week we will get patient in with dermatology    2. Dysuria  Awaiting lab results including UA and urine culture to confirm UTI. We will also start Macrobid  No follow-ups on file. SUBJECTIVE/OBJECTIVE:  HPI     Patient is an [de-identified] female with past medical history of CKD, PAD, prediabetes, and hypertension who presents secondary to rash. Patient and her daughter note that she has had a rash in the inguinal area in the past prior to moving to [place]. 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 Patient has since been in [place] and had resolved however it is now back. Patient has tried multiple things including powders that were given to them by home care. After sending me pictures last week I had decided to place patient on ketoconazole cream secondary to concern for cutaneous candidiasis. Patient's daughter notes that symptoms improved after doing this and stopping the use of a particular type of depends. However when the depends were put back on the rash got significantly worse. Patient now having some bleeding in the area as well. Also notes burning with urination. Home care did draw blood yesterday and take studies for urine culture and urinalysis    Review of Systems ROS negative except for those noted in the HPI above. Vitals:    12/15/21 1535   BP: 130/70   Pulse: 82   Resp: 16   SpO2: 98%         Physical Exam  Constitutional:       Appearance: Normal appearance.    HENT:      Head: Normocephalic and atraumatic. Right Ear: External ear normal.      Left Ear: External ear normal.   Eyes:      Extraocular Movements: Extraocular movements intact. Conjunctiva/sclera: Conjunctivae normal.   Cardiovascular:      Rate and Rhythm: Normal rate and regular rhythm. Heart sounds: Normal heart sounds. No murmur heard. No gallop. Pulmonary:      Effort: Pulmonary effort is normal. No respiratory distress. Breath sounds: Normal breath sounds. No wheezing, rhonchi or rales. Abdominal:      General: Bowel sounds are normal. There is no distension. Palpations: Abdomen is soft. Tenderness: There is no abdominal tenderness. There is no guarding. Musculoskeletal:      Right lower leg: No edema. Left lower leg: No edema. Skin:     Findings: Rash (very ertyhematus rash in the groin bilaterally and under the breast bilaterally) present. Neurological:      General: No focal deficit present. Mental Status: She is alert. Psychiatric:         Mood and Affect: Mood normal.         Behavior: Behavior normal.           An electronic signature was used to authenticate this note.     --Leila Bartholomew,

## 2021-12-16 ENCOUNTER — TELEPHONE (OUTPATIENT)
Dept: INTERNAL MEDICINE CLINIC | Age: 81
End: 2021-12-16

## 2021-12-16 NOTE — TELEPHONE ENCOUNTER
Called anastacio, patients daughter, to review labs that were completed through home care. Labs showed that there was blood in the urine but no UTI. Patient has had blood in the urine prior and had work up with urology for this. BMP and CBC also came back normal which is reassuring. Javier Little did not answer. Message was left for her to call back.      The DO Napoleon

## 2021-12-31 ENCOUNTER — APPOINTMENT (OUTPATIENT)
Dept: CT IMAGING | Age: 81
DRG: 308 | End: 2021-12-31
Payer: MEDICARE

## 2021-12-31 ENCOUNTER — HOSPITAL ENCOUNTER (INPATIENT)
Age: 81
LOS: 4 days | Discharge: HOME HEALTH | DRG: 308 | End: 2022-01-04
Attending: EMERGENCY MEDICINE | Admitting: INTERNAL MEDICINE
Payer: MEDICARE

## 2021-12-31 ENCOUNTER — APPOINTMENT (OUTPATIENT)
Dept: GENERAL RADIOLOGY | Age: 81
DRG: 308 | End: 2021-12-31
Payer: MEDICARE

## 2021-12-31 DIAGNOSIS — S70.01XA CONTUSION OF RIGHT HIP, INITIAL ENCOUNTER: ICD-10-CM
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
## 2021-12-31 DIAGNOSIS — I48.91 NEW ONSET ATRIAL FIBRILLATION (HCC): Primary | ICD-10-CM

## 2021-12-31 DIAGNOSIS — S00.83XA FOREHEAD CONTUSION, INITIAL ENCOUNTER: ICD-10-CM

## 2021-12-31 DIAGNOSIS — U07.1 COVID-19 VIRUS INFECTION: ICD-10-CM

## 2021-12-31 DIAGNOSIS — R55 SYNCOPE AND COLLAPSE: ICD-10-CM

## 2021-12-31 DIAGNOSIS — I48.91 NEW ONSET A-FIB (HCC): ICD-10-CM
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
## 2021-12-31 PROBLEM — G93.41 ACUTE METABOLIC ENCEPHALOPATHY: Status: ACTIVE | Noted: 2021-12-31

## 2021-12-31 LAB
A/G RATIO: 1 (ref 1.1–2.2)
ALBUMIN SERPL-MCNC: 3.5 G/DL (ref 3.4–5)
ALP BLD-CCNC: 138 U/L (ref 40–129)
ALT SERPL-CCNC: 25 U/L (ref 10–40)
ANION GAP SERPL CALCULATED.3IONS-SCNC: 17 MMOL/L (ref 3–16)
APTT: 35.9 SEC (ref 26.2–38.6)
AST SERPL-CCNC: 39 U/L (ref 15–37)
BACTERIA: ABNORMAL /HPF
BASOPHILS ABSOLUTE: 0 K/UL (ref 0–0.2)
BASOPHILS RELATIVE PERCENT: 0.5 %
BILIRUB SERPL-MCNC: 0.5 MG/DL (ref 0–1)
BILIRUBIN URINE: NEGATIVE
BLOOD, URINE: ABNORMAL
BUN BLDV-MCNC: 19 MG/DL (ref 7–20)
CALCIUM SERPL-MCNC: 8.7 MG/DL (ref 8.3–10.6)
CHLORIDE BLD-SCNC: 101 MMOL/L (ref 99–110)
CLARITY: CLEAR
CO2: 22 MMOL/L (ref 21–32)
COLOR: YELLOW
CREAT SERPL-MCNC: 1 MG/DL (ref 0.6–1.2)
EKG DIAGNOSIS: NORMAL
EKG Q-T INTERVAL: 368 MS
EKG QRS DURATION: 78 MS
EKG QTC CALCULATION (BAZETT): 479 MS
EKG R AXIS: 51 DEGREES
EKG T AXIS: 92 DEGREES
EKG VENTRICULAR RATE: 102 BPM
EOSINOPHILS ABSOLUTE: 0 K/UL (ref 0–0.6)
EOSINOPHILS RELATIVE PERCENT: 0.2 %
EPITHELIAL CELLS, UA: ABNORMAL /HPF (ref 0–5)
GFR AFRICAN AMERICAN: >60
GFR NON-AFRICAN AMERICAN: 53
GLUCOSE BLD-MCNC: 120 MG/DL (ref 70–99)
GLUCOSE URINE: NEGATIVE MG/DL
HCT VFR BLD CALC: 43.6 % (ref 36–48)
HEMOGLOBIN: 14.4 G/DL (ref 12–16)
INR BLD: 1.08 (ref 0.88–1.12)
KETONES, URINE: NEGATIVE MG/DL
LEUKOCYTE ESTERASE, URINE: NEGATIVE
LYMPHOCYTES ABSOLUTE: 0.9 K/UL (ref 1–5.1)
LYMPHOCYTES RELATIVE PERCENT: 21.7 %
MAGNESIUM: 2 MG/DL (ref 1.8–2.4)
MCH RBC QN AUTO: 29 PG (ref 26–34)
MCHC RBC AUTO-ENTMCNC: 33.1 G/DL (ref 31–36)
MCV RBC AUTO: 87.7 FL (ref 80–100)
MICROSCOPIC EXAMINATION: YES
MONOCYTES ABSOLUTE: 1 K/UL (ref 0–1.3)
MONOCYTES RELATIVE PERCENT: 22.7 %
NEUTROPHILS ABSOLUTE: 2.4 K/UL (ref 1.7–7.7)
NEUTROPHILS RELATIVE PERCENT: 54.9 %
NITRITE, URINE: NEGATIVE
PDW BLD-RTO: 15.2 % (ref 12.4–15.4)
PH UA: 6.5 (ref 5–8)
PLATELET # BLD: 208 K/UL (ref 135–450)
PMV BLD AUTO: 8.5 FL (ref 5–10.5)
POTASSIUM REFLEX MAGNESIUM: 3.6 MMOL/L (ref 3.5–5.1)
PROTEIN UA: ABNORMAL MG/DL
PROTHROMBIN TIME: 12.3 SEC (ref 9.9–12.7)
RBC # BLD: 4.97 M/UL (ref 4–5.2)
RBC UA: ABNORMAL /HPF (ref 0–4)
SODIUM BLD-SCNC: 140 MMOL/L (ref 136–145)
SPECIFIC GRAVITY UA: 1.02 (ref 1–1.03)
T4 FREE: 1.2 NG/DL (ref 0.9–1.8)
TOTAL PROTEIN: 7.1 G/DL (ref 6.4–8.2)
TSH REFLEX: 3.64 UIU/ML (ref 0.27–4.2)
TSH SERPL DL<=0.05 MIU/L-ACNC: 3.64 UIU/ML (ref 0.27–4.2)
URINE TYPE: ABNORMAL
UROBILINOGEN, URINE: 0.2 E.U./DL
WBC # BLD: 4.3 K/UL (ref 4–11)
WBC UA: ABNORMAL /HPF (ref 0–5)
YEAST: PRESENT /HPF

## 2021-12-31 PROCEDURE — 73502 X-RAY EXAM HIP UNI 2-3 VIEWS: CPT

## 2021-12-31 PROCEDURE — 84439 ASSAY OF FREE THYROXINE: CPT

## 2021-12-31 PROCEDURE — 6370000000 HC RX 637 (ALT 250 FOR IP): Performed by: INTERNAL MEDICINE

## 2021-12-31 PROCEDURE — 6360000002 HC RX W HCPCS: Performed by: INTERNAL MEDICINE

## 2021-12-31 PROCEDURE — 96361 HYDRATE IV INFUSION ADD-ON: CPT

## 2021-12-31 PROCEDURE — 93005 ELECTROCARDIOGRAM TRACING: CPT | Performed by: PHYSICIAN ASSISTANT

## 2021-12-31 PROCEDURE — 85730 THROMBOPLASTIN TIME PARTIAL: CPT

## 2021-12-31 PROCEDURE — 83735 ASSAY OF MAGNESIUM: CPT

## 2021-12-31 PROCEDURE — 84443 ASSAY THYROID STIM HORMONE: CPT

## 2021-12-31 PROCEDURE — 99221 1ST HOSP IP/OBS SF/LOW 40: CPT | Performed by: INTERNAL MEDICINE

## 2021-12-31 PROCEDURE — 85610 PROTHROMBIN TIME: CPT

## 2021-12-31 PROCEDURE — 2580000003 HC RX 258: Performed by: INTERNAL MEDICINE

## 2021-12-31 PROCEDURE — 85025 COMPLETE CBC W/AUTO DIFF WBC: CPT

## 2021-12-31 PROCEDURE — 71045 X-RAY EXAM CHEST 1 VIEW: CPT

## 2021-12-31 PROCEDURE — 2500000003 HC RX 250 WO HCPCS: Performed by: EMERGENCY MEDICINE

## 2021-12-31 PROCEDURE — 81001 URINALYSIS AUTO W/SCOPE: CPT

## 2021-12-31 PROCEDURE — 36415 COLL VENOUS BLD VENIPUNCTURE: CPT

## 2021-12-31 PROCEDURE — 6370000000 HC RX 637 (ALT 250 FOR IP): Performed by: PHYSICIAN ASSISTANT

## 2021-12-31 PROCEDURE — 2580000003 HC RX 258: Performed by: PHYSICIAN ASSISTANT

## 2021-12-31 PROCEDURE — 72125 CT NECK SPINE W/O DYE: CPT

## 2021-12-31 PROCEDURE — 1200000000 HC SEMI PRIVATE

## 2021-12-31 PROCEDURE — 96374 THER/PROPH/DIAG INJ IV PUSH: CPT

## 2021-12-31 PROCEDURE — 99285 EMERGENCY DEPT VISIT HI MDM: CPT

## 2021-12-31 PROCEDURE — 80053 COMPREHEN METABOLIC PANEL: CPT

## 2021-12-31 PROCEDURE — 73700 CT LOWER EXTREMITY W/O DYE: CPT

## 2021-12-31 PROCEDURE — 70450 CT HEAD/BRAIN W/O DYE: CPT

## 2021-12-31 RX ORDER — ACETAMINOPHEN 650 MG/1
650 SUPPOSITORY RECTAL EVERY 6 HOURS PRN
Status: DISCONTINUED | OUTPATIENT
Start: 2021-12-31 | End: 2022-01-04 | Stop reason: HOSPADM

## 2021-12-31 RX ORDER — ATORVASTATIN CALCIUM 40 MG/1
40 TABLET, FILM COATED ORAL DAILY
Status: DISCONTINUED | OUTPATIENT
Start: 2021-12-31 | End: 2022-01-04 | Stop reason: HOSPADM

## 2021-12-31 RX ORDER — ACETAMINOPHEN 325 MG/1
650 TABLET ORAL ONCE
Status: COMPLETED | OUTPATIENT
Start: 2021-12-31 | End: 2021-12-31

## 2021-12-31 RX ORDER — ASPIRIN 81 MG/1
81 TABLET ORAL NIGHTLY
Status: DISCONTINUED | OUTPATIENT
Start: 2021-12-31 | End: 2022-01-04 | Stop reason: HOSPADM

## 2021-12-31 RX ORDER — AZITHROMYCIN 250 MG/1
TABLET, FILM COATED ORAL
Status: ON HOLD | COMMUNITY
Start: 2021-12-30 | End: 2022-01-04 | Stop reason: HOSPADM

## 2021-12-31 RX ORDER — MIRTAZAPINE 15 MG/1
15 TABLET, FILM COATED ORAL NIGHTLY
Status: DISCONTINUED | OUTPATIENT
Start: 2021-12-31 | End: 2022-01-04 | Stop reason: HOSPADM

## 2021-12-31 RX ORDER — POTASSIUM CHLORIDE 20 MEQ/1
40 TABLET, EXTENDED RELEASE ORAL ONCE
Status: COMPLETED | OUTPATIENT
Start: 2021-12-31 | End: 2021-12-31

## 2021-12-31 RX ORDER — SODIUM CHLORIDE 9 MG/ML
25 INJECTION, SOLUTION INTRAVENOUS PRN
Status: DISCONTINUED | OUTPATIENT
Start: 2021-12-31 | End: 2022-01-04 | Stop reason: HOSPADM

## 2021-12-31 RX ORDER — ONDANSETRON 4 MG/1
4 TABLET, ORALLY DISINTEGRATING ORAL EVERY 8 HOURS PRN
Status: DISCONTINUED | OUTPATIENT
Start: 2021-12-31 | End: 2022-01-04 | Stop reason: HOSPADM

## 2021-12-31 RX ORDER — SODIUM CHLORIDE 0.9 % (FLUSH) 0.9 %
5-40 SYRINGE (ML) INJECTION EVERY 12 HOURS SCHEDULED
Status: DISCONTINUED | OUTPATIENT
Start: 2021-12-31 | End: 2022-01-04 | Stop reason: HOSPADM

## 2021-12-31 RX ORDER — ACETAMINOPHEN 325 MG/1
650 TABLET ORAL EVERY 6 HOURS PRN
Status: DISCONTINUED | OUTPATIENT
Start: 2021-12-31 | End: 2022-01-04 | Stop reason: HOSPADM

## 2021-12-31 RX ORDER — 0.9 % SODIUM CHLORIDE 0.9 %
1000 INTRAVENOUS SOLUTION INTRAVENOUS ONCE
Status: COMPLETED | OUTPATIENT
Start: 2021-12-31 | End: 2021-12-31

## 2021-12-31 RX ORDER — POLYETHYLENE GLYCOL 3350 17 G/17G
17 POWDER, FOR SOLUTION ORAL DAILY PRN
Status: DISCONTINUED | OUTPATIENT
Start: 2021-12-31 | End: 2022-01-04 | Stop reason: HOSPADM

## 2021-12-31 RX ORDER — SODIUM CHLORIDE 0.9 % (FLUSH) 0.9 %
5-40 SYRINGE (ML) INJECTION PRN
Status: DISCONTINUED | OUTPATIENT
Start: 2021-12-31 | End: 2022-01-04 | Stop reason: HOSPADM

## 2021-12-31 RX ORDER — DONEPEZIL HYDROCHLORIDE 5 MG/1
5 TABLET, FILM COATED ORAL NIGHTLY
Status: DISCONTINUED | OUTPATIENT
Start: 2021-12-31 | End: 2022-01-04 | Stop reason: HOSPADM

## 2021-12-31 RX ORDER — ONDANSETRON 2 MG/ML
4 INJECTION INTRAMUSCULAR; INTRAVENOUS EVERY 6 HOURS PRN
Status: DISCONTINUED | OUTPATIENT
Start: 2021-12-31 | End: 2022-01-04 | Stop reason: HOSPADM

## 2021-12-31 RX ORDER — METOPROLOL TARTRATE 5 MG/5ML
5 INJECTION INTRAVENOUS ONCE
Status: COMPLETED | OUTPATIENT
Start: 2021-12-31 | End: 2021-12-31

## 2021-12-31 RX ADMIN — ASPIRIN 81 MG: 81 TABLET, COATED ORAL at 22:18

## 2021-12-31 RX ADMIN — DONEPEZIL HYDROCHLORIDE 5 MG: 5 TABLET, FILM COATED ORAL at 22:18

## 2021-12-31 RX ADMIN — ATORVASTATIN CALCIUM 40 MG: 40 TABLET, FILM COATED ORAL at 15:31

## 2021-12-31 RX ADMIN — ACETAMINOPHEN 650 MG: 325 TABLET ORAL at 02:43

## 2021-12-31 RX ADMIN — MIRTAZAPINE 15 MG: 15 TABLET, FILM COATED ORAL at 22:18

## 2021-12-31 RX ADMIN — POTASSIUM CHLORIDE 40 MEQ: 1500 TABLET, EXTENDED RELEASE ORAL at 05:57

## 2021-12-31 RX ADMIN — SODIUM CHLORIDE, PRESERVATIVE FREE 10 ML: 5 INJECTION INTRAVENOUS at 22:19

## 2021-12-31 RX ADMIN — ENOXAPARIN SODIUM 30 MG: 100 INJECTION SUBCUTANEOUS at 22:18

## 2021-12-31 RX ADMIN — SODIUM CHLORIDE 1000 ML: 0.9 INJECTION, SOLUTION INTRAVENOUS at 03:24

## 2021-12-31 RX ADMIN — METOPROLOL TARTRATE 5 MG: 5 INJECTION INTRAVENOUS at 04:47

## 2021-12-31 RX ADMIN — SODIUM CHLORIDE, PRESERVATIVE FREE 10 ML: 5 INJECTION INTRAVENOUS at 11:09

## 2021-12-31 ASSESSMENT — PAIN SCALES - GENERAL
PAINLEVEL_OUTOF10: 0
PAINLEVEL_OUTOF10: 0
PAINLEVEL_OUTOF10: 4
PAINLEVEL_OUTOF10: 0

## 2021-12-31 NOTE — PROGRESS NOTES
Pts poc is her daughter, Wallace Parker. Wallace Parker was called and updated concerning status and plan of care. Is requesting for physician to evaluate her to see if she would benefit from monoclonal antibody infusions. Will pass this along to physician. All other questions answered at this time. Informed her that her mother was placed in bilateral wrist restraints due to frequently getting out of bed, high fall risk, pulling ivs out and tele off. Wallace Parekr verbalized understanding. Will continue to monitor.

## 2021-12-31 NOTE — ED TRIAGE NOTES
Pt. Presents to ED via squad with c/o fall resulting in right hip pain. Pt. Also has a contusion to forehead and is unsure if it is related to the fall. States she is not sure if she takes blood thinners. Denies LOC, lightheadedness, dizziness, chest pain, or SOB. COVID+ 10/29/21.

## 2021-12-31 NOTE — H&P
Hospital Medicine  History and Physical    PCP: Zee Hunter DO  Patient Name: Hipolito Mendez    Date of Service: Pt seen/examined on 12/31/21 and admitted to Inpatient with expected LOS greater than two midnights due to medical therapy    CHIEF COMPLAINT:  Pt c/o right hip pain status post fall  HISTORY OF PRESENT ILLNESS: Patient is a poor historian at this time, and information for this report is derived from conversation with the emergency room physician and review of the records. Pt is an [de-identified]y.o. year-old female with a history of hypertension, hyperlipidemia, peripheral arterial disease and low back pain. She presents from her nursing facility for evaluation of right hip pain status post fall. Imaging did not reveal any fractures. During the course of her evaluation she was found to have rapid atrial fibrillation. She has no previous history of A. fib. She is being admitted for further evaluation and treatment. Pt unable to provide associated signs and symptoms at this time due to altered mental status      Past Medical History:        Diagnosis Date    Anxiety     Chronic renal insufficiency     COVID-19 virus infection 12/30/2021    Dementia ([facility] [facility]     Hyperlipidemia     Hypertension     Peripheral arterial disease ([facility] [address] [facility]     Prediabetes        Past Surgical History:    History reviewed. No pertinent surgical history. Allergies:  Clarithromycin and Sulfamethoxazole-trimethoprim    Medications Prior to Admission:    Prior to Admission medications    Medication Sig Start Date End Date Taking? Authorizing Provider   azithromycin (ZITHROMAX) 250 MG tablet Take as directed 12/30/21  Yes Historical Provider, MD   ketoconazole (NIZORAL) 2 % cream Apply topically daily.  12/15/21  Yes Coreen Gutiérrez DO   mirtazapine (REMERON) 15 MG tablet TAKE 1 TABLET BY MOUTH EVERY DAY AT NIGHT 10/11/21  Yes Coreen Gutiérrez DO   donepezil (ARICEPT) 5 MG tablet TAKE 1 TABLET BY MOUTH EVERY DAY AT NIGHT 10/11/21  Yes Coreen Gutiérrez,    atorvastatin (LIPITOR) 40 MG tablet Take 1 tablet by mouth daily 8/16/21  Yes Coreen SAMANTA Vickelizabeth DO   aspirin 81 MG EC tablet Take 81 mg by mouth nightly    Yes Historical Provider, MD       Family History:   Family history is negative for accelerated coronary artery disease, diabetes or malignancies. Social History:   TOBACCO:   reports that she has never smoked. She has never used smokeless tobacco.  ETOH:   reports no history of alcohol use. OCCUPATION:      REVIEW OF SYSTEMS:  A review of systems could not be performed due to patient's current mental status    Physical Exam:    Vitals: /68   Pulse 70   Temp 97.6 °F (36.4 °C) (Oral)   Resp 18   Ht 5' 7\" (1.702 m)   Wt 169 lb 15.6 oz (77.1 kg)   SpO2 96%   BMI 26.62 kg/m²   General appearance: WD/WN [de-identified]y.o. year-old female who is alert, appears stated age and is cooperative  HEENT: Head: Normocephalic, no lesions, without obvious abnormality. Eye: Normal external eye, conjunctiva, lids cornea, PEERL. Ears: Normal external ears. Non-tender. Nose: Normal external nose, mucus membranes and septum. Pharynx: Dental Hygiene adequate. Normal buccal mucosa. Normal pharynx. Neck: no adenopathy, no carotid bruit, no JVD, supple, symmetrical, trachea midline and thyroid not enlarged, symmetric, no tenderness/mass/nodules  Lungs: clear to auscultation bilaterally and no use of accessory muscles  Heart: irregularly irregular, rapid rate, S1, S2 normal, no murmur, click, rub or gallop and normal apical impulse  Abdomen: soft, non-tender; bowel sounds normal; no masses, no organomegaly  Extremities: extremities atraumatic, no cyanosis or edema and Homans sign is negative, no sign of DVT.     Capillary Refill: Acceptable < 3 seconds   Peripheral Pulses: +3 easily felt, not easily obliterated with pressures   Skin: Skin color, texture, turgor normal. No rashes or lesions on exposed skin  Neurologic: Neurovascularly intact without any focal sensory/motor deficits. Cranial nerves: II-XII intact, grossly non-focal. Gait was not tested. Mental Status: Alert and oriented to self only    CBC:   Recent Labs     12/31/21  0104   WBC 4.3   HGB 14.4        BMP:  No results for input(s): NA, K, CL, CO2, BUN, CREATININE, GLUCOSE in the last 72 hours. Troponin: No results for input(s): TROPONINI in the last 72 hours. PT/INR:  No results found for: PTINR  U/A:    Lab Results   Component Value Date    LEUKOCYTESUR Negative 12/31/2021    NITRITE Negative 10/07/2021    RBCUA 21-50 12/31/2021    SPECGRAV 1.025 12/31/2021    UROBILINOGEN 0.2 12/31/2021    BILIRUBINUR Negative 12/31/2021    BILIRUBINUR Negative 12/14/2021    BILIRUBINUR Negative 12/14/2021    BLOODU LARGE 12/31/2021    GLUCOSEU Negative 12/31/2021    PROTEINU TRACE 12/31/2021         RAD:   I have independently reviewed and interpreted the imaging studies below and based my recommendations to the patient on those findings. CT HEAD WO CONTRAST    Result Date: 12/31/2021  Patient: Jodie Dove  Time Out: 03:50 Exam(s): CT HEAD Without Contrast  EXAM:   CT Head Without Intravenous Contrast  CLINICAL HISTORY:   left forehead contusion post fall. TECHNIQUE:   Axial computed tomography images of the head/brain without intravenous contrast.  CTDI is 67.8 mGy and DLP is 1254 mGy-cm. All CT scans at this facility use dose modulation, iterative reconstruction, and/or weight based dosing when appropriate to reduce radiation dose to as low as reasonably achievable. COMPARISON:   No relevant prior studies available. FINDINGS:   Brain: Generalized cerebral volume loss and chronic, small vessel ischemic changes in the white matter. No acute intracranial hemorrhage, edema or abnormal mass-effect. Ventricles:  Unremarkable. No ventriculomegaly. Bones/joints:  Unremarkable. No acute fracture. Soft tissues:  Small left frontal scalp hematoma.    Sinuses:  Unremarkable as visualized. No acute sinusitis. Mastoid air cells:  Unremarkable as visualized. No mastoid effusion. Electronically signed by Arlin Pacheco MD on 12-20-68 at 3804      No acute findings in the head/brain. CT CERVICAL SPINE WO CONTRAST    Result Date: 12/31/2021  Patient: Isaak Collar  Time Out: 03:53 Exam(s): CT C SPINE  EXAM:   CT Cervical Spine Without Intravenous Contrast  CLINICAL HISTORY:   head trauma post fall. TECHNIQUE:   Axial computed tomography images of the cervical spine without intravenous contrast.  CTDI is 14.6 mGy and DLP is 331.6 mGy-cm. All CT scans at this facility use dose modulation, iterative reconstruction, and/or weight based dosing when appropriate to reduce radiation dose to as low as reasonably achievable. COMPARISON:   No relevant prior studies available. FINDINGS:   Vertebrae:  Minimal anterior spondylolisthesis of C6 on C7, likely chronic and degenerative in nature. No acute fracture. Discs/spinal canal/neural foramina:  Moderate to advanced disc degeneration at C4/C5 and C5/C6. Multilevel degenerative arthropathy of the facet joints bilaterally. Neural foraminal narrowing at multiple levels, most severe at C5/C6 on the right. Soft tissues:  Unremarkable. Electronically signed by Arlin Pacheco MD on 01-67-88 at [id number]      No acute findings in the cervical spine. XR CHEST PORTABLE    Result Date: 12/31/2021  PORTABLE AP CHEST AT 0116 HOURS:   HISTORY: Fall, syncope. COMPARISON: 11/17/2021. FINDINGS: The heart and pulmonary vasculature are within normal limits. The lungs are well-expanded and are clear bilaterally. There is no evidence of infiltrate or effusion. Bony structures and soft tissues are unremarkable. No acute cardiopulmonary findings.      CT HIP RIGHT WO CONTRAST    Result Date: 12/31/2021  Patient: Isaak Collar  Time Out: 04:20 Exam(s): CT RIGHT HIP Without Contrast  EXAM:   CT Right Lower Extremity Without Intravenous Contrast, Hip CLINICAL HISTORY:   pain post fall, unable to bear weight. TECHNIQUE:   Axial computed tomography images of the right hip without intravenous contrast.  All CT scans at this facility use dose modulation, iterative reconstruction, and/or weight based dosing when appropriate to reduce radiation dose to as low as reasonably achievable. COMPARISON:   No relevant prior studies available. FINDINGS:   Bones/joints:  Advanced degenerative arthropathy of the right hip joint with loss of joint space, sclerosis and subcortical cyst formation. No acute fracture. No dislocation. Soft tissues:  Unremarkable. Electronically signed by Kim Sommer MD on [id number] at [id number]      No acute findings in the right hip. Advanced degenerative arthropathy of the right hip. XR HIP 2-3 VW W PELVIS RIGHT    Result Date: 12/31/2021  AP PELVIS AND RIGHT HIP: HISTORY: Pain after fall. TECHNIQUE: AP view the pelvis and additional view of the right hip are obtained. FINDINGS: The bony pelvis appears intact. There are severe degenerative arthritic changes seen of both hip joints. No definite fracture or dislocation is seen. Severe degenerative arthritis of the hip joints bilaterally. No acute findings. Assessment:   Principal Problem:    New onset a-fib ([address]  Active Problems:    Essential hypertension    Hyperlipidemia    PAD (peripheral artery disease) (HCC)    Acute metabolic encephalopathy  Resolved Problems:    * No resolved hospital problems. *      Plan:     Covid19 infection - Pt has a reinfection case of COVID-19, having already recovered from a COVID-19 infection in October of this year, and now developing symptoms yesterday and testing positive as an outpatient today, as both her daughter and son-in-law are also currently positive for COVID-19. New onset a-fib (HCC) - Bt has been back and forth between A fin and NSR. Will consult Cardiology.  Check Thyroid function tests    PAD (peripheral artery disease) ([address] - continue ASA and Statin     Acute metabolic encephalopathy - said to be worse than her baseline dementia. Possibly due to illness. We will monitor and provide supportive care    Essential (primary) hypertension -not currently requiring medications at home; monitor blood pressure    Hyperlipidemia - No current evidence of Rhabdomyolysis or other adverse effects. Continue statin therapy while in the hospital        DVT Prophylaxis: Low intensity Enoxaparin   Diet: ADULT DIET; Regular  Code Status: Full Code  (Advanced care planning has been discussed with patient and/or responsible family member and is reflected in the code status.  Further orders associated with this have been entered if appropriate)    Disposition: Anticipate that patient will remain in the hospital for 2 to 3 days depending on further evaluation and clinical course   Please note that over 50 minutes was spent in evaluating the patient, review of records and results, discussion with staff/family, etc.    Hugo Vogel MD

## 2021-12-31 NOTE — ED PROVIDER NOTES
ED Attending Attestation Note     Date of evaluation: 12/31/2021    This patient was seen by the advance practice provider. I have seen and examined the patient, agree with the workup, evaluation, management and diagnosis. The care plan has been discussed. I have reviewed the ECG and concur with the LIBIA's interpretation. My assessment reveals a frail appearing elderly patient, confused, but pleasantly conversational, in some discomfort when her right hip is manipulated, but in no acute distress. She presents by EMS after a fall at home off of the toilet, striking her forehead, where there is a small contusion, and landing on her right hip. She was unable to bear weight on her hip at home, and does have some wincing with manipulation of the hip here, although no obvious deformity. Plain films show no acute bony abnormality, so CT is pending. Additionally, the patient apparently has a reinfection case of COVID-19, having already recovered from a COVID-19 infection in October of this year, and now developing symptoms yesterday and testing positive as an outpatient today, as both her daughter and son-in-law are also currently positive for COVID-19. The patient is noted to be in and out of atrial fibrillation, and does not appear to have any prior history of this diagnosis. ADDENDUM:      Care of this patient was assumed from the physician's assistant primarily at the conclusion of her shift at 0300. The patient was seen for Fall (right hip pain)  . The patient's initial evaluation and plan have been discussed with the prior provider who initially evaluated the patient. Nursing Notes, Past Medical Hx, Past Surgical Hx, Social Hx, Allergies, and Family Hx were all reviewed. Diagnostic Results       RADIOLOGY:  CT HIP RIGHT WO CONTRAST   Final Result     No acute findings in the right hip. Advanced degenerative    arthropathy of the right hip.           CT CERVICAL SPINE WO CONTRAST   Final Result No acute findings in the cervical spine. CT HEAD WO CONTRAST   Final Result     No acute findings in the head/brain. XR HIP 2-3 VW W PELVIS RIGHT   Final Result      Severe degenerative arthritis of the hip joints bilaterally. No acute findings. XR CHEST PORTABLE   Final Result      No acute cardiopulmonary findings.              LABS:   Results for orders placed or performed during the hospital encounter of 12/31/21   CBC auto differential   Result Value Ref Range    WBC 4.3 4.0 - 11.0 K/uL    RBC 4.97 4.00 - 5.20 M/uL    Hemoglobin 14.4 12.0 - 16.0 g/dL    Hematocrit 43.6 36.0 - 48.0 %    MCV 87.7 80.0 - 100.0 fL    MCH 29.0 26.0 - 34.0 pg    MCHC 33.1 31.0 - 36.0 g/dL    RDW 15.2 12.4 - 15.4 %    Platelets 300 436 - 786 K/uL    MPV 8.5 5.0 - 10.5 fL    Neutrophils % 54.9 %    Lymphocytes % 21.7 %    Monocytes % 22.7 %    Eosinophils % 0.2 %    Basophils % 0.5 %    Neutrophils Absolute 2.4 1.7 - 7.7 K/uL    Lymphocytes Absolute 0.9 (L) 1.0 - 5.1 K/uL    Monocytes Absolute 1.0 0.0 - 1.3 K/uL    Eosinophils Absolute 0.0 0.0 - 0.6 K/uL    Basophils Absolute 0.0 0.0 - 0.2 K/uL   PT - INR   Result Value Ref Range    Protime 12.3 9.9 - 12.7 sec    INR 1.08 0.88 - 1.12   PTT   Result Value Ref Range    aPTT 35.9 26.2 - 38.6 sec   Troponin (lab)   Result Value Ref Range    Troponin <0.01 <0.01 ng/mL   Basic Metabolic Panel w/ Reflex to MG   Result Value Ref Range    Sodium 139 136 - 145 mmol/L    Potassium reflex Magnesium 3.4 (L) 3.5 - 5.1 mmol/L    Chloride 102 99 - 110 mmol/L    CO2 26 21 - 32 mmol/L    Anion Gap 11 3 - 16    Glucose 132 (H) 70 - 99 mg/dL    BUN 18 7 - 20 mg/dL    CREATININE 1.0 0.6 - 1.2 mg/dL    GFR Non- 53 (A) >60    GFR African American >60 >60    Calcium 8.4 8.3 - 10.6 mg/dL   Urinalysis, reflex to microscopic (Lab)   Result Value Ref Range    Color, UA Yellow Straw/Yellow    Clarity, UA Clear Clear    Glucose, Ur Negative Negative mg/dL    Bilirubin Urine Negative Negative    Ketones, Urine Negative Negative mg/dL    Specific Gravity, UA 1.025 1.005 - 1.030    Blood, Urine LARGE (A) Negative    pH, UA 6.5 5.0 - 8.0    Protein, UA TRACE (A) Negative mg/dL    Urobilinogen, Urine 0.2 <2.0 E.U./dL    Nitrite, Urine Negative Negative    Leukocyte Esterase, Urine Negative Negative    Microscopic Examination YES     Urine Type Voided    Magnesium   Result Value Ref Range    Magnesium 2.00 1.80 - 2.40 mg/dL   Microscopic Urinalysis   Result Value Ref Range    WBC, UA 3-5 0 - 5 /HPF    RBC, UA 21-50 (A) 0 - 4 /HPF    Epithelial Cells, UA 6-10 (A) 0 - 5 /HPF    Bacteria, UA 1+ (A) None Seen /HPF    Yeast, UA Present (A) None Seen /HPF       RECENT VITALS:  BP: (!) 154/133, Temp: 97.3 °F (36.3 °C), Pulse: 153, Resp: 16, SpO2: 99 %     Procedures       ED Course     The patient was given the following medications:  Orders Placed This Encounter   Medications    acetaminophen (TYLENOL) tablet 650 mg    0.9 % sodium chloride bolus    metoprolol (LOPRESSOR) injection 5 mg       CONSULTS:  IP CONSULT TO HOSPITALIST    MEDICAL DECISION MAKING / ASSESSMENT / Riky Steele is a [de-identified] y.o. female, who typically lives at home with family members, who presents to the emergency department today, just having been diagnosed with COVID-19, together with her family, yesterday, with a fall at home. She has significant right hip pain, and is now not able to bear weight, whereas her baseline is ambulating with a walker at home. Additionally, she is found to be in new onset atrial fibrillation, with a variably rapid ventricular response. Please see the physician's assistant's initial dictation for details of the patient's history, physical examination, and initial emergency department course. The patient's care was given over to me at 0300 hrs.  at the conclusion of her shift, with cross-sectional imaging pending, and plans for admission for the patient's new onset atrial fibrillation, and worsening debility precluding her return home at this time. As noted above, the patient's cross-sectional imaging showed no intracranial injury, no acute traumatic injury of the cervical spine, and significant arthrosis of the right hip, without acute bony abnormality. On my reevaluation, the patient denied any significant pain in her right hip, and at this time the hip was able to be moved without significant pain. She appears to have developed no other new sources of pain since initial evaluation. The patient continues in atrial fibrillation a little bit more persistently now, with heart rates that ranged widely from the low 100s into the 130s during my time in the room. She is being given a single dose of 5 mg of metoprolol, to see if this improves her rate somewhat, but I would be gentle with attempts at rate control, as the lower range of her heart rate is already in the low 100s. Given her age and fall risk, she is not having anticoagulation initiated from the emergency department. Clinical Impression     1. New onset atrial fibrillation ([address]    2. COVID-19 virus infection    3. Forehead contusion, initial encounter    4. Contusion of right hip, initial encounter    5. Fall in home, initial encounter        Disposition     PATIENT REFERRED TO:  No follow-up provider specified.     DISCHARGE MEDICATIONS:  New Prescriptions    No medications on file       DISPOSITION Decision To Admit 12/31/2021 04:34:37 AM       Geoffrey Lynn MD  12/31/21 [id number]

## 2021-12-31 NOTE — PROGRESS NOTES
4 Eyes Admission Assessment     I agree as the admission nurse that 2 RN's have performed a thorough Head to Toe Skin Assessment on the patient. ALL assessment sites listed below have been assessed on admission. Areas assessed by both nurses:   [x]   Head, Face, and Ears   [x]   Shoulders, Back, and Chest: red excoriation noted under breasts  [x]   Arms, Elbows, and Hands   [x]   Coccyx, Sacrum, and Ischium: red excoriating noted to periarea and buttocks  [x]   Legs, Feet, and Heels        Does the Patient have Skin Breakdown?   No         Rui Prevention initiated:  No   Wound Care Orders initiated:  No      [facility] nurse consulted for Pressure Injury (Stage 3,4, Unstageable, DTI, NWPT, and Complex wounds) or Rui score 18 or lower:  No      Nurse 1 eSignature: Electronically signed by Cesia Galvan RN on 12/31/21 at 4:56 PM EST    **SHARE this note so that the co-signing nurse is able to place an eSignature**    Nurse 2 eSignature: Electronically signed by Olegario Santiago RN on 12/31/21 at 7:47 AM EST

## 2021-12-31 NOTE — CONSULTS
mirtazapine (REMERON) 15 MG tablet TAKE 1 TABLET BY MOUTH EVERY DAY AT NIGHT 10/11/21  Yes Coreen Gutiérrez DO   donepezil (ARICEPT) 5 MG tablet TAKE 1 TABLET BY MOUTH EVERY DAY AT NIGHT 10/11/21  Yes Coreen Gutiérrez DO   atorvastatin (LIPITOR) 40 MG tablet Take 1 tablet by mouth daily 8/16/21  Yes Coreen Gutiérrez DO   aspirin 81 MG EC tablet Take 81 mg by mouth nightly    Yes Historical Provider, MD          Inpatient Medications:   sodium chloride flush  5-40 mL IntraVENous 2 times per day    enoxaparin  40 mg SubCUTAneous Daily       IV drips:   sodium chloride         PRN:  sodium chloride flush, sodium chloride, ondansetron **OR** ondansetron, polyethylene glycol, acetaminophen **OR** acetaminophen    Allergy:     Clarithromycin and Sulfamethoxazole-trimethoprim       Review of Systems:     Covid precautions    Physical Examination:     Vitals:    12/31/21 0505 12/31/21 0531 12/31/21 0602 12/31/21 0701   BP: 97/69 113/85 116/79 116/72   Pulse: 96 71 98 75   Resp:    16   Temp:    97 °F (36.1 °C)   TempSrc:    Oral   SpO2: 95% 96% 97% 95%   Weight:    169 lb 15.6 oz (77.1 kg)   Height:    5' 7\" (1.702 m)       Wt Readings from Last 3 Encounters:   12/31/21 169 lb 15.6 oz (77.1 kg)   12/15/21 176 lb (79.8 kg)   11/17/21 184 lb (83.5 kg)       Objective    Covid precautions    Labs:     Recent Labs     12/31/21  0104   MG 2.00     Recent Labs     12/31/21  0104   WBC 4.3   HGB 14.4   HCT 43.6      MCV 87.7     No results for input(s): CHOLTOT, TRIG, HDL, LDL in the last 72 hours. Invalid input(s): [address] [facility], CHOLHDL, VLDCHOL  Recent Labs     12/31/21  0104   INR 1.08     No results for input(s): CKTOTAL, CKMB, CKMBINDEX, TROPONINI in the last 72 hours. No results for input(s): BNP in the last 72 hours. No results for input(s): TSH in the last 72 hours.   No results for input(s): CHOL, HDL, LDLCALC, TRIG in the last 72 hours.]    Lab Results   Component Value Date    TROPONINI <0.01 11/17/2021         Imaging:     I personally reviewed imaging studies including CXR, Stress test, TTE/WOLFGANG. Last ECG (if available) -personally interpreted EKG:  I have reviewed EKG with the following interpretation  Atrial flutter/sinus    Telemetry: Personally interpreted  Sinus  Last Stress (if available):    Last TTE/WOLFGANG(if available):    Last Cath (if available):    Last CMR  (if available):      Assessment / Plan:     Atrial fibrillation.  -Likely triggered by Covid  -Currently normal sinus rhythm. Isolated episode of A. Fib.  -No need to start anticoagulation.  -Seems that in the past had a Holter monitor without evidence of A. fib.  -EKG shows a combination of atrial flutter/sinus rhythm.  -Continue to monitor, if there is recurrence of A. fib start anticoagulation and to start metoprolol 25 twice a day    Diann Jin MD, [facility], [facility]

## 2021-12-31 NOTE — PLAN OF CARE
Problem: Falls - Risk of:  Goal: Will remain free from falls  Description: Will remain free from falls  Note: Pt free from injury or falls at this time, fall precautions in place, bed in low position, side rail up x2, Mckeon Fall Risk: High (45 and higher), bed alarm on, reoriented to room and call light, reminded not to get up without assistance, call light in reach, will continue to monitor. Pt verbalizes understanding of fall risk procedures but continued to attempt to get out of bed despite redirection and education. Placed in bilateral wrist restraints for safety. Problem: Non-Violent Restraints  Goal: Removal from restraints as soon as assessed to be safe  Note: Pt remains in bilateral soft wrist restraints due poor safety awareness and pulling at lines/tubes/equipments. Visual checks every  hour and restraint need/assessment re-evaluated every 2 hours per hospital policy. See restraint flowsheet. Goal is for patient to remain free of physical, harm/injury. Will continue to monitor.

## 2022-01-01 LAB
A/G RATIO: 1 (ref 1.1–2.2)
ALBUMIN SERPL-MCNC: 3.3 G/DL (ref 3.4–5)
ALP BLD-CCNC: 131 U/L (ref 40–129)
ALT SERPL-CCNC: 27 U/L (ref 10–40)
ANION GAP SERPL CALCULATED.3IONS-SCNC: 9 MMOL/L (ref 3–16)
AST SERPL-CCNC: 49 U/L (ref 15–37)
BASOPHILS ABSOLUTE: 0 K/UL (ref 0–0.2)
BASOPHILS RELATIVE PERCENT: 0.5 %
BILIRUB SERPL-MCNC: 0.6 MG/DL (ref 0–1)
BUN BLDV-MCNC: 23 MG/DL (ref 7–20)
CALCIUM SERPL-MCNC: 8.4 MG/DL (ref 8.3–10.6)
CHLORIDE BLD-SCNC: 107 MMOL/L (ref 99–110)
CO2: 26 MMOL/L (ref 21–32)
CREAT SERPL-MCNC: 0.9 MG/DL (ref 0.6–1.2)
EOSINOPHILS ABSOLUTE: 0 K/UL (ref 0–0.6)
EOSINOPHILS RELATIVE PERCENT: 0.3 %
GFR AFRICAN AMERICAN: >60
GFR NON-AFRICAN AMERICAN: >60
GLUCOSE BLD-MCNC: 89 MG/DL (ref 70–99)
HCT VFR BLD CALC: 43 % (ref 36–48)
HEMOGLOBIN: 14.2 G/DL (ref 12–16)
LYMPHOCYTES ABSOLUTE: 1.2 K/UL (ref 1–5.1)
LYMPHOCYTES RELATIVE PERCENT: 25.1 %
MAGNESIUM: 1.9 MG/DL (ref 1.8–2.4)
MCH RBC QN AUTO: 28.9 PG (ref 26–34)
MCHC RBC AUTO-ENTMCNC: 33.1 G/DL (ref 31–36)
MCV RBC AUTO: 87.4 FL (ref 80–100)
MONOCYTES ABSOLUTE: 0.8 K/UL (ref 0–1.3)
MONOCYTES RELATIVE PERCENT: 16.7 %
NEUTROPHILS ABSOLUTE: 2.7 K/UL (ref 1.7–7.7)
NEUTROPHILS RELATIVE PERCENT: 57.4 %
PDW BLD-RTO: 14.8 % (ref 12.4–15.4)
PLATELET # BLD: 215 K/UL (ref 135–450)
PMV BLD AUTO: 8.1 FL (ref 5–10.5)
POTASSIUM REFLEX MAGNESIUM: 3.4 MMOL/L (ref 3.5–5.1)
RBC # BLD: 4.92 M/UL (ref 4–5.2)
SODIUM BLD-SCNC: 142 MMOL/L (ref 136–145)
TOTAL PROTEIN: 6.7 G/DL (ref 6.4–8.2)
WBC # BLD: 4.8 K/UL (ref 4–11)

## 2022-01-01 PROCEDURE — 99233 SBSQ HOSP IP/OBS HIGH 50: CPT | Performed by: INTERNAL MEDICINE

## 2022-01-01 PROCEDURE — 1200000000 HC SEMI PRIVATE

## 2022-01-01 PROCEDURE — 6370000000 HC RX 637 (ALT 250 FOR IP): Performed by: INTERNAL MEDICINE

## 2022-01-01 PROCEDURE — U0003 INFECTIOUS AGENT DETECTION BY NUCLEIC ACID (DNA OR RNA); SEVERE ACUTE RESPIRATORY SYNDROME CORONAVIRUS 2 (SARS-COV-2) (CORONAVIRUS DISEASE [COVID-19]), AMPLIFIED PROBE TECHNIQUE, MAKING USE OF HIGH THROUGHPUT TECHNOLOGIES AS DESCRIBED BY CMS-2020-01-R: HCPCS

## 2022-01-01 PROCEDURE — U0005 INFEC AGEN DETEC AMPLI PROBE: HCPCS

## 2022-01-01 PROCEDURE — 83735 ASSAY OF MAGNESIUM: CPT

## 2022-01-01 PROCEDURE — 2580000003 HC RX 258: Performed by: INTERNAL MEDICINE

## 2022-01-01 PROCEDURE — 6360000002 HC RX W HCPCS: Performed by: INTERNAL MEDICINE

## 2022-01-01 PROCEDURE — 85025 COMPLETE CBC W/AUTO DIFF WBC: CPT

## 2022-01-01 PROCEDURE — 80053 COMPREHEN METABOLIC PANEL: CPT

## 2022-01-01 PROCEDURE — 36415 COLL VENOUS BLD VENIPUNCTURE: CPT

## 2022-01-01 RX ORDER — POTASSIUM CHLORIDE 20 MEQ/1
20 TABLET, EXTENDED RELEASE ORAL 2 TIMES DAILY WITH MEALS
Status: COMPLETED | OUTPATIENT
Start: 2022-01-01 | End: 2022-01-01

## 2022-01-01 RX ORDER — POTASSIUM CHLORIDE 7.45 MG/ML
10 INJECTION INTRAVENOUS
Status: DISCONTINUED | OUTPATIENT
Start: 2022-01-01 | End: 2022-01-01

## 2022-01-01 RX ORDER — MAGNESIUM SULFATE 1 G/100ML
1000 INJECTION INTRAVENOUS ONCE
Status: COMPLETED | OUTPATIENT
Start: 2022-01-01 | End: 2022-01-01

## 2022-01-01 RX ADMIN — ENOXAPARIN SODIUM 30 MG: 100 INJECTION SUBCUTANEOUS at 20:20

## 2022-01-01 RX ADMIN — SODIUM CHLORIDE 25 ML: 9 INJECTION, SOLUTION INTRAVENOUS at 10:00

## 2022-01-01 RX ADMIN — METOPROLOL 25 MG: 25 TABLET ORAL at 20:17

## 2022-01-01 RX ADMIN — ATORVASTATIN CALCIUM 40 MG: 40 TABLET, FILM COATED ORAL at 08:33

## 2022-01-01 RX ADMIN — POTASSIUM CHLORIDE 20 MEQ: 1500 TABLET, EXTENDED RELEASE ORAL at 10:04

## 2022-01-01 RX ADMIN — POTASSIUM CHLORIDE 20 MEQ: 1500 TABLET, EXTENDED RELEASE ORAL at 18:26

## 2022-01-01 RX ADMIN — ENOXAPARIN SODIUM 30 MG: 100 INJECTION SUBCUTANEOUS at 08:33

## 2022-01-01 RX ADMIN — MAGNESIUM SULFATE HEPTAHYDRATE 1000 MG: 1 INJECTION, SOLUTION INTRAVENOUS at 10:03

## 2022-01-01 RX ADMIN — DONEPEZIL HYDROCHLORIDE 5 MG: 5 TABLET, FILM COATED ORAL at 20:17

## 2022-01-01 RX ADMIN — MIRTAZAPINE 15 MG: 15 TABLET, FILM COATED ORAL at 20:17

## 2022-01-01 RX ADMIN — METOPROLOL 25 MG: 25 TABLET ORAL at 12:23

## 2022-01-01 RX ADMIN — SODIUM CHLORIDE, PRESERVATIVE FREE 10 ML: 5 INJECTION INTRAVENOUS at 08:33

## 2022-01-01 RX ADMIN — SODIUM CHLORIDE, PRESERVATIVE FREE 10 ML: 5 INJECTION INTRAVENOUS at 20:18

## 2022-01-01 RX ADMIN — ASPIRIN 81 MG: 81 TABLET, COATED ORAL at 20:17

## 2022-01-01 ASSESSMENT — PAIN SCALES - GENERAL
PAINLEVEL_OUTOF10: 0

## 2022-01-01 NOTE — PROGRESS NOTES
[facility]  Cardiology Inpatient Consult Service  Daily Progress Note        Admit Date:  12/31/2021    Referring Physician: Oneida Garcia DO    Reason for Consultation/Chief Complaint:   Atrial fibrillation. Subjective: Interval history:  LAZARO from CV perspective    Medications:   potassium chloride  10 mEq IntraVENous Q1H    sodium chloride flush  5-40 mL IntraVENous 2 times per day    enoxaparin  30 mg SubCUTAneous BID    mirtazapine  15 mg Oral Nightly    donepezil  5 mg Oral Nightly    atorvastatin  40 mg Oral Daily    aspirin  81 mg Oral Nightly       IV drips:   sodium chloride         PRN:  sodium chloride flush, sodium chloride, ondansetron **OR** ondansetron, polyethylene glycol, acetaminophen **OR** acetaminophen      Objective:     Vitals:    12/31/21 2130 01/01/22 0115 01/01/22 0452 01/01/22 0823   BP: 135/77 (!) 142/76 (!) 168/78 (!) 178/83   Pulse: 74 86 86 97   Resp: 22 20 22 18   Temp: 98 °F (36.7 °C) 98.6 °F (37 °C) 97.8 °F (36.6 °C) 99.6 °F (37.6 °C)   TempSrc: Oral Oral Oral Oral   SpO2: 97% 95% 95% 97%   Weight:       Height:           Intake/Output Summary (Last 24 hours) at 1/1/2022 0841  Last data filed at 1/1/2022 0823  Gross per 24 hour   Intake 120 ml   Output --   Net 120 ml     No intake/output data recorded.   Wt Readings from Last 3 Encounters:   12/31/21 169 lb 15.6 oz (77.1 kg)   12/15/21 176 lb (79.8 kg)   11/17/21 184 lb (83.5 kg)       Admit Wt: Weight: 169 lb 15.6 oz (77.1 kg)   Todays Wt: Weight: 169 lb 15.6 oz (77.1 kg)    TELEMETRY: Personally interpreted Sinus     Physical Exam:     COVID precautions      Objective    Labs:   Recent Labs     12/31/21  0104 01/01/22  0608    142   K 3.6 3.4*   BUN 19 23*   CREATININE 1.0 0.9    107   CO2 22 26   GLUCOSE 120* 89   CALCIUM 8.7 8.4   MG 2.00 1.90     Recent Labs     12/31/21  0104 12/31/21  0104 01/01/22  0608   WBC 4.3  --  4.8   HGB 14.4  --  14.2   HCT 43.6  --  43.0   PLT 208  --  215   MCV 87.7   < > 87.4    < > = values in this interval not displayed. No results for input(s): CHOLTOT, TRIG, HDL, LDL in the last 72 hours. Invalid input(s): [address], CHOLHDL, VLDCHOL  Recent Labs     12/31/21 0104   INR 1.08     No results for input(s): CKTOTAL, CKMB, CKMBINDEX, TROPONINI in the last 72 hours. No results for input(s): BNP in the last 72 hours. No results for input(s): NTPROBNP in the last 72 hours. Recent Labs     12/31/21 0104   TSH 3.64       Imaging:   I personally reviewed imaging studies including CXR, Stress test, TTE/WOLFGANG. Assessment & Plan:     Atrial fibrillation.  -Likely triggered by Covid  -This morning in NSR  - If she goes back into AF please start anticoag.  -Seems that in the past had a Holter monitor without evidence of A. fib. -Admission EKG shows a combination of atrial flutter/sinus rhythm. -BP uncontrolled, consider starting low dose metop (not great BP med but can help with BP and AF/tachy)        Brian Coello MD, [facility]    1/1/2022 8:41 AM

## 2022-01-01 NOTE — PLAN OF CARE
Problem: Falls - Risk of:  Goal: Will remain free from falls  Description: Will remain free from falls  1/1/2022 0647 by LILLIAN BOWLES  Outcome: Ongoing  Note: Bed is in the lowest position, room is free from clutter, bed alarm is in use.        Problem: Non-Violent Restraints  Goal: No harm/injury to patient while restraints in use  Outcome: Ongoing  Note: TrIAL RELEASE HAS BEEN PERFORMED AND PATIENT STILL DISPLAYS SAME BEHAVIOR

## 2022-01-01 NOTE — PROGRESS NOTES
Updated pts Daughter, her POC. Addressed all questions and concerns. Called from pts room to provide opportunity for her to speak with the pt.

## 2022-01-01 NOTE — PROGRESS NOTES
Hospitalist Progress Note      PCP: Tracy Larson DO    Date of Admission: 12/31/2021    Chief Complaint: Fall, hip pain    Hospital Course: Found to have new onset Afib, covid19 infection without hypoxemia    Subjective:   Feels well, no complaints currently. No cough. No shortness of breath. Medications:  Reviewed    Infusion Medications    sodium chloride 25 mL (01/01/22 1000)     Scheduled Medications    potassium chloride  20 mEq Oral BID WC    metoprolol tartrate  25 mg Oral BID    sodium chloride flush  5-40 mL IntraVENous 2 times per day    enoxaparin  30 mg SubCUTAneous BID    mirtazapine  15 mg Oral Nightly    donepezil  5 mg Oral Nightly    atorvastatin  40 mg Oral Daily    aspirin  81 mg Oral Nightly     PRN Meds: sodium chloride flush, sodium chloride, ondansetron **OR** ondansetron, polyethylene glycol, acetaminophen **OR** acetaminophen      Intake/Output Summary (Last 24 hours) at 1/1/2022 1150  Last data filed at 1/1/2022 0823  Gross per 24 hour   Intake 120 ml   Output --   Net 120 ml       Exam:    BP (!) 178/83   Pulse 97   Temp 99.6 °F (37.6 °C) (Oral)   Resp 18   Ht 5' 7\" (1.702 m)   Wt 169 lb 15.6 oz (77.1 kg)   SpO2 97%   BMI 26.62 kg/m²     General appearance: No apparent distress, appears stated age and cooperative. HEENT: Pupils equal, round, and reactive to light. Conjunctivae/corneas clear. Neck: Supple, with full range of motion. No jugular venous distention. Trachea midline. Respiratory:  Normal respiratory effort. Clear to auscultation, bilaterally without Rales/Wheezes/Rhonchi. Cardiovascular: Regular rate and rhythm with normal S1/S2 without murmurs, rubs or gallops. Abdomen: Soft, non-tender, non-distended with normal bowel sounds. Musculoskelatal: No clubbing, cyanosis or edema bilaterally. Skin: Skin color, texture, turgor normal.  No rashes or lesions.   Neurologic:  Cranial nerves: II-XII intact, grossly non-focal.  Psychiatric: Alert and oriented, thought content appropriate, normal insight    Labs:   Recent Labs     12/31/21  0104 01/01/22  0608   WBC 4.3 4.8   HGB 14.4 14.2   HCT 43.6 43.0    215     Recent Labs     12/31/21 0104 01/01/22  0608    142   K 3.6 3.4*    107   CO2 22 26   BUN 19 23*   CREATININE 1.0 0.9   CALCIUM 8.7 8.4     Recent Labs     12/31/21  0104 01/01/22  0608   AST 39* 49*   ALT 25 27   BILITOT 0.5 0.6   ALKPHOS 138* 131*     Recent Labs     12/31/21  0104   INR 1.08     No results for input(s): CKTOTAL, TROPONINI in the last 72 hours. Studies:  CT HIP RIGHT WO CONTRAST   Final Result     No acute findings in the right hip. Advanced degenerative    arthropathy of the right hip. CT CERVICAL SPINE WO CONTRAST   Final Result     No acute findings in the cervical spine. CT HEAD WO CONTRAST   Final Result     No acute findings in the head/brain. XR HIP 2-3 VW W PELVIS RIGHT   Final Result      Severe degenerative arthritis of the hip joints bilaterally. No acute findings. XR CHEST PORTABLE   Final Result      No acute cardiopulmonary findings. Assessment/Plan:    Active Hospital Problems    Diagnosis Date Noted    New onset a-fib ([facility] [address] [I48.91] 12/31/2021    Acute metabolic encephalopathy [B72.35] 12/31/2021    PAD (peripheral artery disease) ([facility] [address] [I73.9] 02/26/2019    Essential hypertension [I10] 02/26/2019    Hyperlipidemia [E78.5] 09/21/2005     Covid19 infection - Pt has a reinfection case of COVID-19, having already recovered from a COVID-19 infection in October of this year, and now developing symptoms yesterday and testing positive as an outpatient, as both her daughter and son-in-law are also currently positive for COVID-19.  -No hypoxia  -monitor for development of worsening symptoms or oxygen requirement  -Per notes patient family inquiring about monoclonal antibody.  No Covid documentation in Epic, I will order a PCR test.     New onset a-fib (HCC) - Bt has been back and forth between A fin and NSR. Cardiology consulted  Now in NSR  If remains in NSR, no a/c  Start Lopressor, BP and HR high while in NSR     PAD (peripheral artery disease) ([facility] [address] - continue ASA and Statin     Acute metabolic encephalopathy - said to be worse than her baseline dementia. Possibly due to illness. We will monitor and provide supportive care     Essential (primary) hypertension  Not on home medications  Lopressor added     Hyperlipidemia  Statin       DVT Prophylaxis: Low intensity Enoxaparin   Diet: ADULT DIET;  Regular  Code Status: Full Code    PT/OT Eval Status: pending    Dispo - Inpatient    Janessa Child DO

## 2022-01-01 NOTE — PLAN OF CARE
Problem: Falls - Risk of:  Goal: Will remain free from falls  Description: Will remain free from falls  1/1/2022 1327 by Shannon Bloom RN  Outcome: Ongoing  Note:  Pt remains without falls during this shift. The bed is in lowest position, wheels are locked, and rails are up 2/4. Camera in place      Problem: Non-Violent Restraints  Goal: No harm/injury to patient while restraints in use  1/1/2022 1327 by Shannon Bloom RN  Outcome: Ongoing  Note: Patient remains in nonviolent restraints d/t agitation, confusion, and self-destructive behavior. Hourly rounds and ROM performed to prevent any skin injury, none noted. Will continue to monitor and monitor need for restraints.        Problem: Skin Integrity:  Goal: Absence of new skin breakdown  Description: Absence of new skin breakdown  Outcome: Ongoing  Note: See LDA, turned every 2 hours, assisted to position of comfort, heels elevated with pillows

## 2022-01-02 LAB
A/G RATIO: 0.9 (ref 1.1–2.2)
ALBUMIN SERPL-MCNC: 3.1 G/DL (ref 3.4–5)
ALP BLD-CCNC: 120 U/L (ref 40–129)
ALT SERPL-CCNC: 29 U/L (ref 10–40)
ANION GAP SERPL CALCULATED.3IONS-SCNC: 5 MMOL/L (ref 3–16)
AST SERPL-CCNC: 55 U/L (ref 15–37)
BASOPHILS ABSOLUTE: 0 K/UL (ref 0–0.2)
BASOPHILS RELATIVE PERCENT: 0.7 %
BILIRUB SERPL-MCNC: 0.6 MG/DL (ref 0–1)
BUN BLDV-MCNC: 22 MG/DL (ref 7–20)
CALCIUM SERPL-MCNC: 8.3 MG/DL (ref 8.3–10.6)
CHLORIDE BLD-SCNC: 105 MMOL/L (ref 99–110)
CO2: 29 MMOL/L (ref 21–32)
CREAT SERPL-MCNC: 0.8 MG/DL (ref 0.6–1.2)
EOSINOPHILS ABSOLUTE: 0.1 K/UL (ref 0–0.6)
EOSINOPHILS RELATIVE PERCENT: 1.7 %
GFR AFRICAN AMERICAN: >60
GFR NON-AFRICAN AMERICAN: >60
GLUCOSE BLD-MCNC: 88 MG/DL (ref 70–99)
HCT VFR BLD CALC: 41.5 % (ref 36–48)
HEMOGLOBIN: 13.9 G/DL (ref 12–16)
LYMPHOCYTES ABSOLUTE: 1.2 K/UL (ref 1–5.1)
LYMPHOCYTES RELATIVE PERCENT: 32.1 %
MCH RBC QN AUTO: 29.1 PG (ref 26–34)
MCHC RBC AUTO-ENTMCNC: 33.4 G/DL (ref 31–36)
MCV RBC AUTO: 87.2 FL (ref 80–100)
MONOCYTES ABSOLUTE: 0.6 K/UL (ref 0–1.3)
MONOCYTES RELATIVE PERCENT: 16 %
NEUTROPHILS ABSOLUTE: 1.9 K/UL (ref 1.7–7.7)
NEUTROPHILS RELATIVE PERCENT: 49.5 %
PDW BLD-RTO: 15.2 % (ref 12.4–15.4)
PLATELET # BLD: 208 K/UL (ref 135–450)
PMV BLD AUTO: 8.3 FL (ref 5–10.5)
POTASSIUM REFLEX MAGNESIUM: 4 MMOL/L (ref 3.5–5.1)
RBC # BLD: 4.76 M/UL (ref 4–5.2)
SARS-COV-2: DETECTED
SARS-COV-2: DETECTED
SODIUM BLD-SCNC: 139 MMOL/L (ref 136–145)
TOTAL PROTEIN: 6.5 G/DL (ref 6.4–8.2)
WBC # BLD: 3.8 K/UL (ref 4–11)

## 2022-01-02 PROCEDURE — 1200000000 HC SEMI PRIVATE

## 2022-01-02 PROCEDURE — 85025 COMPLETE CBC W/AUTO DIFF WBC: CPT

## 2022-01-02 PROCEDURE — 2580000003 HC RX 258: Performed by: INTERNAL MEDICINE

## 2022-01-02 PROCEDURE — 6370000000 HC RX 637 (ALT 250 FOR IP): Performed by: INTERNAL MEDICINE

## 2022-01-02 PROCEDURE — 6360000002 HC RX W HCPCS: Performed by: INTERNAL MEDICINE

## 2022-01-02 PROCEDURE — 99233 SBSQ HOSP IP/OBS HIGH 50: CPT | Performed by: INTERNAL MEDICINE

## 2022-01-02 PROCEDURE — U0005 INFEC AGEN DETEC AMPLI PROBE: HCPCS

## 2022-01-02 PROCEDURE — U0003 INFECTIOUS AGENT DETECTION BY NUCLEIC ACID (DNA OR RNA); SEVERE ACUTE RESPIRATORY SYNDROME CORONAVIRUS 2 (SARS-COV-2) (CORONAVIRUS DISEASE [COVID-19]), AMPLIFIED PROBE TECHNIQUE, MAKING USE OF HIGH THROUGHPUT TECHNOLOGIES AS DESCRIBED BY CMS-2020-01-R: HCPCS

## 2022-01-02 PROCEDURE — 2500000003 HC RX 250 WO HCPCS: Performed by: INTERNAL MEDICINE

## 2022-01-02 PROCEDURE — 36415 COLL VENOUS BLD VENIPUNCTURE: CPT

## 2022-01-02 PROCEDURE — 80053 COMPREHEN METABOLIC PANEL: CPT

## 2022-01-02 RX ADMIN — DONEPEZIL HYDROCHLORIDE 5 MG: 5 TABLET, FILM COATED ORAL at 20:58

## 2022-01-02 RX ADMIN — METOPROLOL 25 MG: 25 TABLET ORAL at 20:59

## 2022-01-02 RX ADMIN — MICONAZOLE NITRATE: 2 POWDER TOPICAL at 20:58

## 2022-01-02 RX ADMIN — MICONAZOLE NITRATE: 2 POWDER TOPICAL at 04:57

## 2022-01-02 RX ADMIN — MIRTAZAPINE 15 MG: 15 TABLET, FILM COATED ORAL at 20:59

## 2022-01-02 RX ADMIN — ENOXAPARIN SODIUM 30 MG: 100 INJECTION SUBCUTANEOUS at 20:58

## 2022-01-02 RX ADMIN — ASPIRIN 81 MG: 81 TABLET, COATED ORAL at 20:59

## 2022-01-02 RX ADMIN — SODIUM CHLORIDE, PRESERVATIVE FREE 10 ML: 5 INJECTION INTRAVENOUS at 10:38

## 2022-01-02 RX ADMIN — ATORVASTATIN CALCIUM 40 MG: 40 TABLET, FILM COATED ORAL at 10:37

## 2022-01-02 RX ADMIN — ENOXAPARIN SODIUM 30 MG: 100 INJECTION SUBCUTANEOUS at 10:37

## 2022-01-02 RX ADMIN — MICONAZOLE NITRATE: 2 POWDER TOPICAL at 10:38

## 2022-01-02 RX ADMIN — SODIUM CHLORIDE, PRESERVATIVE FREE 10 ML: 5 INJECTION INTRAVENOUS at 20:59

## 2022-01-02 RX ADMIN — ACETAMINOPHEN 650 MG: 325 TABLET ORAL at 12:06

## 2022-01-02 RX ADMIN — METOPROLOL 25 MG: 25 TABLET ORAL at 10:37

## 2022-01-02 ASSESSMENT — PAIN DESCRIPTION - DESCRIPTORS: DESCRIPTORS: SORE

## 2022-01-02 ASSESSMENT — PAIN - FUNCTIONAL ASSESSMENT: PAIN_FUNCTIONAL_ASSESSMENT: ACTIVITIES ARE NOT PREVENTED

## 2022-01-02 ASSESSMENT — PAIN DESCRIPTION - FREQUENCY: FREQUENCY: CONTINUOUS

## 2022-01-02 ASSESSMENT — PAIN SCALES - GENERAL
PAINLEVEL_OUTOF10: 10
PAINLEVEL_OUTOF10: 0

## 2022-01-02 ASSESSMENT — PAIN DESCRIPTION - PROGRESSION: CLINICAL_PROGRESSION: NOT CHANGED

## 2022-01-02 ASSESSMENT — PAIN DESCRIPTION - PAIN TYPE: TYPE: ACUTE PAIN

## 2022-01-02 ASSESSMENT — PAIN DESCRIPTION - ORIENTATION: ORIENTATION: LOWER

## 2022-01-02 ASSESSMENT — PAIN DESCRIPTION - LOCATION: LOCATION: BACK

## 2022-01-02 ASSESSMENT — PAIN DESCRIPTION - ONSET: ONSET: ON-GOING

## 2022-01-02 NOTE — PROGRESS NOTES
[facility]  Cardiology Inpatient Consult Service  Daily Progress Note        Admit Date:  12/31/2021    Referring Physician: Cherelle Nash DO    Reason for Consultation/Chief Complaint:   Atrial fibrillation. Subjective: Interval history:  LAZARO from CV perspective    Medications:   miconazole   Topical BID    metoprolol tartrate  25 mg Oral BID    sodium chloride flush  5-40 mL IntraVENous 2 times per day    enoxaparin  30 mg SubCUTAneous BID    mirtazapine  15 mg Oral Nightly    donepezil  5 mg Oral Nightly    atorvastatin  40 mg Oral Daily    aspirin  81 mg Oral Nightly       IV drips:   sodium chloride 25 mL (01/01/22 1000)       PRN:  sodium chloride flush, sodium chloride, ondansetron **OR** ondansetron, polyethylene glycol, acetaminophen **OR** acetaminophen      Objective:     Vitals:    01/01/22 2000 01/01/22 2200 01/02/22 0000 01/02/22 0454   BP: (!) 153/68 (!) 145/72 (!) 158/73 (!) 151/73   Pulse: 68 69 69    Resp: 16 16 16 16   Temp: 99.3 °F (37.4 °C) 98 °F (36.7 °C) 98.3 °F (36.8 °C) 98.2 °F (36.8 °C)   TempSrc: Oral Oral Oral Axillary   SpO2: 95% 95% 96% 97%   Weight:       Height:           Intake/Output Summary (Last 24 hours) at 1/2/2022 0850  Last data filed at 1/2/2022 0000  Gross per 24 hour   Intake 600 ml   Output --   Net 600 ml     I/O last 3 completed shifts: In: 720 [P.O.:720]  Out: -   Wt Readings from Last 3 Encounters:   12/31/21 169 lb 15.6 oz (77.1 kg)   12/15/21 176 lb (79.8 kg)   11/17/21 184 lb (83.5 kg)       Admit Wt: Weight: 169 lb 15.6 oz (77.1 kg)   Todays Wt: Weight: 169 lb 15.6 oz (77.1 kg)    TELEMETRY: Personally interpreted Sinus     Physical Exam:     COVID precautions      Objective:  Vital signs: (most recent): Blood pressure (!) 151/73, pulse 69, temperature 98.2 °F (36.8 °C), temperature source Axillary, resp. rate 16, height 5' 7\" (1.702 m), weight 169 lb 15.6 oz (77.1 kg), SpO2 97 %.         Labs:   Recent Labs

## 2022-01-02 NOTE — PROGRESS NOTES
Removed pt restraints per md to trial pt without restraints at this time. Pt is sleeping bed bed. At this time. Will monitor.

## 2022-01-02 NOTE — PLAN OF CARE
Problem: Non-Violent Restraints  Goal: Removal from restraints as soon as assessed to be safe  Outcome: Ongoing  Note: Pt placed back in bilateral wrist restraints after climbing out of bed and removing the IV from her arm. Hourly rounds and ROM performed to prevent any skin injury, none noted. Will continue to monitor and monitor need for restraints. Problem: Gas Exchange - Impaired  Goal: Absence of hypoxia  Outcome: Ongoing  Note: Pt remains on room air with O2 sats above 92%. No signs of respiratory distress at this time. Problem: Pain:  Goal: Control of acute pain  Description: Control of acute pain  Outcome: Ongoing  Note: Monitoring pain trend, implementing non-pharm interventions and also admins PRN pain medication. Will continue to monitor and maintain Pts goal for pain relief.

## 2022-01-02 NOTE — PROGRESS NOTES
Pt is resting in bed at this time. remains without restraints at this time no issues noted bed alarm on.

## 2022-01-02 NOTE — PLAN OF CARE
Problem: Falls - Risk of:  Goal: Will remain free from falls  Description: Will remain free from falls  1/1/2022 2321 by Duayne Ahumada, RN  Outcome: Met This Shift  1/1/2022 1327 by Maame Wu RN  Outcome: Ongoing  Note:  Pt remains without falls during this shift. The bed is in lowest position, wheels are locked, and rails are up 2/4.  Camera in place      Problem: Non-Violent Restraints  Goal: Removal from restraints as soon as assessed to be safe  Outcome: Met This Shift     Problem: Non-Violent Restraints  Goal: Patient's dignity will be maintained  Outcome: Met This Shift     Problem: Skin Integrity:  Goal: Will show no infection signs and symptoms  Description: Will show no infection signs and symptoms  Outcome: Met This Shift     Problem: Skin Integrity:  Goal: Absence of new skin breakdown  Description: Absence of new skin breakdown  1/1/2022 2321 by Duayne Ahumada, RN  Outcome: Met This Shift  1/1/2022 1327 by Maame Wu RN  Outcome: Ongoing  Note: See LDA, turned every 2 hours, assisted to position of comfort, heels elevated with pillows

## 2022-01-02 NOTE — PROGRESS NOTES
Hospitalist Progress Note      PCP: Melvin Jain DO    Date of Admission: 12/31/2021    Chief Complaint: Fall, hip pain    Hospital Course: Found to have new onset Afib, covid19 infection without hypoxemia, currently in SR. Subjective:   Feels well, no complaints currently. No cough. No shortness of breath. Mild confusion. Medications:  Reviewed    Infusion Medications    sodium chloride 25 mL (01/01/22 1000)     Scheduled Medications    miconazole   Topical BID    metoprolol tartrate  25 mg Oral BID    sodium chloride flush  5-40 mL IntraVENous 2 times per day    enoxaparin  30 mg SubCUTAneous BID    mirtazapine  15 mg Oral Nightly    donepezil  5 mg Oral Nightly    atorvastatin  40 mg Oral Daily    aspirin  81 mg Oral Nightly     PRN Meds: sodium chloride flush, sodium chloride, ondansetron **OR** ondansetron, polyethylene glycol, acetaminophen **OR** acetaminophen      Intake/Output Summary (Last 24 hours) at 1/2/2022 1227  Last data filed at 1/2/2022 1154  Gross per 24 hour   Intake 680 ml   Output --   Net 680 ml       Exam:    BP (!) 146/80   Pulse 62   Temp 98.5 °F (36.9 °C) (Oral)   Resp 18   Ht 5' 7\" (1.702 m)   Wt 169 lb 15.6 oz (77.1 kg)   SpO2 95%   BMI 26.62 kg/m²     General appearance: No apparent distress, appears stated age and cooperative. HEENT: Pupils equal, round, and reactive to light. Conjunctivae/corneas clear. Neck: Supple, with full range of motion. No jugular venous distention. Trachea midline. Respiratory:  Normal respiratory effort. Clear to auscultation, bilaterally without Rales/Wheezes/Rhonchi. Cardiovascular: Regular rate and rhythm with normal S1/S2 without murmurs, rubs or gallops. Abdomen: Soft, non-tender, non-distended with normal bowel sounds. Musculoskelatal: No clubbing, cyanosis or edema bilaterally. Skin: Skin color, texture, turgor normal.  No rashes or lesions.   Neurologic:  Cranial nerves: II-XII intact, grossly non-focal.  Psychiatric: Alert and oriented, thought content appropriate, normal insight    Labs:   Recent Labs     12/31/21  0104 01/01/22  0608 01/02/22  0555   WBC 4.3 4.8 3.8*   HGB 14.4 14.2 13.9   HCT 43.6 43.0 41.5    215 208     Recent Labs     12/31/21  0104 01/01/22  0608 01/02/22  0555    142 139   K 3.6 3.4* 4.0    107 105   CO2 22 26 29   BUN 19 23* 22*   CREATININE 1.0 0.9 0.8   CALCIUM 8.7 8.4 8.3     Recent Labs     12/31/21  0104 01/01/22  0608 01/02/22  0555   AST 39* 49* 55*   ALT 25 27 29   BILITOT 0.5 0.6 0.6   ALKPHOS 138* 131* 120     Recent Labs     12/31/21  0104   INR 1.08     No results for input(s): Hilary Hussein in the last 72 hours. Studies:  CT HIP RIGHT WO CONTRAST   Final Result     No acute findings in the right hip. Advanced degenerative    arthropathy of the right hip. CT CERVICAL SPINE WO CONTRAST   Final Result     No acute findings in the cervical spine. CT HEAD WO CONTRAST   Final Result     No acute findings in the head/brain. XR HIP 2-3 VW W PELVIS RIGHT   Final Result      Severe degenerative arthritis of the hip joints bilaterally. No acute findings. XR CHEST PORTABLE   Final Result      No acute cardiopulmonary findings.              Assessment/Plan:    Active Hospital Problems    Diagnosis Date Noted    New onset a-fib ([facility] [address] [I48.91] 12/31/2021    Acute metabolic encephalopathy [H46.36] 12/31/2021    PAD (peripheral artery disease) ([facility] [address] [I73.9] 02/26/2019    Essential hypertension [I10] 02/26/2019    Hyperlipidemia [E78.5] 09/21/2005     Covid19 infection - Pt has a reinfection case of COVID-19, having already recovered from a COVID-19 infection in October of this year, and now developing symptoms yesterday and testing positive as an outpatient, as both her daughter and son-in-law are also currently positive for COVID-19.  -No hypoxia  -monitor for development of worsening symptoms or oxygen requirement  -PCR positive, cont to monitor.      New onset a-fib (HCC) - Bt has been back and forth between A fin and NSR. Cardiology consulted  Now in NSR  If remains in NSR, no a/c  Start Lopressor, BP and HR high while in NSR     PAD (peripheral artery disease) ([facility] [address] - continue ASA and Statin     Acute metabolic encephalopathy - said to be worse than her baseline dementia. Possibly due to illness. We will monitor and provide supportive care     Essential (primary) hypertension  Not on home medications  Lopressor added     Hyperlipidemia  Statin       DVT Prophylaxis: Low intensity Enoxaparin   Diet: ADULT DIET; Regular;  No Added Salt (3-4 gm)  Code Status: Full Code    PT/OT Eval Status:     Dispo - possible dc 1-2 days    Janessa Child DO

## 2022-01-03 ENCOUNTER — APPOINTMENT (OUTPATIENT)
Dept: CT IMAGING | Age: 82
DRG: 308 | End: 2022-01-03
Payer: MEDICARE

## 2022-01-03 LAB
A/G RATIO: 1 (ref 1.1–2.2)
A/G RATIO: 1.2 (ref 1.1–2.2)
ALBUMIN SERPL-MCNC: 3.4 G/DL (ref 3.4–5)
ALBUMIN SERPL-MCNC: 3.6 G/DL (ref 3.4–5)
ALP BLD-CCNC: 115 U/L (ref 40–129)
ALP BLD-CCNC: 132 U/L (ref 40–129)
ALT SERPL-CCNC: 28 U/L (ref 10–40)
ALT SERPL-CCNC: 33 U/L (ref 10–40)
ANION GAP SERPL CALCULATED.3IONS-SCNC: 13 MMOL/L (ref 3–16)
ANION GAP SERPL CALCULATED.3IONS-SCNC: 8 MMOL/L (ref 3–16)
AST SERPL-CCNC: 44 U/L (ref 15–37)
AST SERPL-CCNC: 53 U/L (ref 15–37)
BASE EXCESS ARTERIAL: -3 (ref -3–3)
BASOPHILS ABSOLUTE: 0 K/UL (ref 0–0.2)
BASOPHILS RELATIVE PERCENT: 0.5 %
BILIRUB SERPL-MCNC: 0.7 MG/DL (ref 0–1)
BILIRUB SERPL-MCNC: 0.9 MG/DL (ref 0–1)
BUN BLDV-MCNC: 22 MG/DL (ref 7–20)
BUN BLDV-MCNC: 22 MG/DL (ref 7–20)
CALCIUM IONIZED: 1.13 MMOL/L (ref 1.12–1.32)
CALCIUM SERPL-MCNC: 8.2 MG/DL (ref 8.3–10.6)
CALCIUM SERPL-MCNC: 8.8 MG/DL (ref 8.3–10.6)
CHLORIDE BLD-SCNC: 103 MMOL/L (ref 99–110)
CHLORIDE BLD-SCNC: 106 MMOL/L (ref 99–110)
CO2: 23 MMOL/L (ref 21–32)
CO2: 28 MMOL/L (ref 21–32)
CREAT SERPL-MCNC: 0.8 MG/DL (ref 0.6–1.2)
CREAT SERPL-MCNC: 0.8 MG/DL (ref 0.6–1.2)
D DIMER: 420 NG/ML DDU (ref 0–229)
EKG ATRIAL RATE: 63 BPM
EKG DIAGNOSIS: NORMAL
EKG P AXIS: 57 DEGREES
EKG P-R INTERVAL: 168 MS
EKG Q-T INTERVAL: 436 MS
EKG QRS DURATION: 92 MS
EKG QTC CALCULATION (BAZETT): 446 MS
EKG R AXIS: 46 DEGREES
EKG T AXIS: 62 DEGREES
EKG VENTRICULAR RATE: 63 BPM
EOSINOPHILS ABSOLUTE: 0.1 K/UL (ref 0–0.6)
EOSINOPHILS RELATIVE PERCENT: 2.5 %
GFR AFRICAN AMERICAN: >60
GFR AFRICAN AMERICAN: >60
GFR NON-AFRICAN AMERICAN: >60
GFR NON-AFRICAN AMERICAN: >60
GLUCOSE BLD-MCNC: 163 MG/DL (ref 70–99)
GLUCOSE BLD-MCNC: 178 MG/DL (ref 70–99)
GLUCOSE BLD-MCNC: 91 MG/DL (ref 70–99)
HCO3 ARTERIAL: 21.6 MMOL/L (ref 21–29)
HCT VFR BLD CALC: 42.4 % (ref 36–48)
HEMOGLOBIN: 13.9 G/DL (ref 12–16)
LACTATE: 4.16 MMOL/L (ref 0.4–2)
LACTIC ACID: 1.5 MMOL/L (ref 0.4–2)
LV EF: 58 %
LVEF MODALITY: NORMAL
LYMPHOCYTES ABSOLUTE: 1 K/UL (ref 1–5.1)
LYMPHOCYTES RELATIVE PERCENT: 32.2 %
MCH RBC QN AUTO: 29 PG (ref 26–34)
MCHC RBC AUTO-ENTMCNC: 32.8 G/DL (ref 31–36)
MCV RBC AUTO: 88.4 FL (ref 80–100)
MONOCYTES ABSOLUTE: 0.5 K/UL (ref 0–1.3)
MONOCYTES RELATIVE PERCENT: 15.3 %
NEUTROPHILS ABSOLUTE: 1.6 K/UL (ref 1.7–7.7)
NEUTROPHILS RELATIVE PERCENT: 49.5 %
O2 SAT, ARTERIAL: 95 % (ref 93–100)
PCO2 ARTERIAL: 34.3 MM HG (ref 35–45)
PDW BLD-RTO: 14.7 % (ref 12.4–15.4)
PERFORMED ON: ABNORMAL
PH ARTERIAL: 7.41 (ref 7.35–7.45)
PLATELET # BLD: 205 K/UL (ref 135–450)
PMV BLD AUTO: 8.5 FL (ref 5–10.5)
PO2 ARTERIAL: 76.1 MM HG (ref 75–108)
POC POTASSIUM: 3.5 MMOL/L (ref 3.5–5.1)
POC SAMPLE TYPE: ABNORMAL
POC SODIUM: 140 MMOL/L (ref 136–145)
POTASSIUM REFLEX MAGNESIUM: 3.7 MMOL/L (ref 3.5–5.1)
POTASSIUM REFLEX MAGNESIUM: 3.8 MMOL/L (ref 3.5–5.1)
RBC # BLD: 4.8 M/UL (ref 4–5.2)
SODIUM BLD-SCNC: 139 MMOL/L (ref 136–145)
SODIUM BLD-SCNC: 142 MMOL/L (ref 136–145)
TCO2 ARTERIAL: 23 MMOL/L
TOTAL CK: 410 U/L (ref 26–192)
TOTAL PROTEIN: 6.2 G/DL (ref 6.4–8.2)
TOTAL PROTEIN: 7.3 G/DL (ref 6.4–8.2)
TROPONIN: <0.01 NG/ML
WBC # BLD: 3.2 K/UL (ref 4–11)

## 2022-01-03 PROCEDURE — 84295 ASSAY OF SERUM SODIUM: CPT

## 2022-01-03 PROCEDURE — 97116 GAIT TRAINING THERAPY: CPT

## 2022-01-03 PROCEDURE — 36600 WITHDRAWAL OF ARTERIAL BLOOD: CPT

## 2022-01-03 PROCEDURE — 97530 THERAPEUTIC ACTIVITIES: CPT

## 2022-01-03 PROCEDURE — 97162 PT EVAL MOD COMPLEX 30 MIN: CPT

## 2022-01-03 PROCEDURE — 85379 FIBRIN DEGRADATION QUANT: CPT

## 2022-01-03 PROCEDURE — 36415 COLL VENOUS BLD VENIPUNCTURE: CPT

## 2022-01-03 PROCEDURE — 2580000003 HC RX 258: Performed by: STUDENT IN AN ORGANIZED HEALTH CARE EDUCATION/TRAINING PROGRAM

## 2022-01-03 PROCEDURE — 85025 COMPLETE CBC W/AUTO DIFF WBC: CPT

## 2022-01-03 PROCEDURE — 2580000003 HC RX 258: Performed by: INTERNAL MEDICINE

## 2022-01-03 PROCEDURE — 82550 ASSAY OF CK (CPK): CPT

## 2022-01-03 PROCEDURE — 6370000000 HC RX 637 (ALT 250 FOR IP): Performed by: INTERNAL MEDICINE

## 2022-01-03 PROCEDURE — 97535 SELF CARE MNGMENT TRAINING: CPT

## 2022-01-03 PROCEDURE — 93005 ELECTROCARDIOGRAM TRACING: CPT | Performed by: STUDENT IN AN ORGANIZED HEALTH CARE EDUCATION/TRAINING PROGRAM

## 2022-01-03 PROCEDURE — 82803 BLOOD GASES ANY COMBINATION: CPT

## 2022-01-03 PROCEDURE — 80053 COMPREHEN METABOLIC PANEL: CPT

## 2022-01-03 PROCEDURE — 84132 ASSAY OF SERUM POTASSIUM: CPT

## 2022-01-03 PROCEDURE — 82947 ASSAY GLUCOSE BLOOD QUANT: CPT

## 2022-01-03 PROCEDURE — 82330 ASSAY OF CALCIUM: CPT

## 2022-01-03 PROCEDURE — 83605 ASSAY OF LACTIC ACID: CPT

## 2022-01-03 PROCEDURE — 97166 OT EVAL MOD COMPLEX 45 MIN: CPT

## 2022-01-03 PROCEDURE — 97110 THERAPEUTIC EXERCISES: CPT

## 2022-01-03 PROCEDURE — 1200000000 HC SEMI PRIVATE

## 2022-01-03 PROCEDURE — 99233 SBSQ HOSP IP/OBS HIGH 50: CPT | Performed by: INTERNAL MEDICINE

## 2022-01-03 PROCEDURE — 6360000002 HC RX W HCPCS: Performed by: INTERNAL MEDICINE

## 2022-01-03 PROCEDURE — 70450 CT HEAD/BRAIN W/O DYE: CPT

## 2022-01-03 PROCEDURE — 84484 ASSAY OF TROPONIN QUANT: CPT

## 2022-01-03 PROCEDURE — 93010 ELECTROCARDIOGRAM REPORT: CPT | Performed by: INTERNAL MEDICINE

## 2022-01-03 PROCEDURE — 93308 TTE F-UP OR LMTD: CPT

## 2022-01-03 RX ORDER — 0.9 % SODIUM CHLORIDE 0.9 %
1000 INTRAVENOUS SOLUTION INTRAVENOUS ONCE
Status: COMPLETED | OUTPATIENT
Start: 2022-01-03 | End: 2022-01-03

## 2022-01-03 RX ORDER — 0.9 % SODIUM CHLORIDE 0.9 %
500 INTRAVENOUS SOLUTION INTRAVENOUS ONCE
Status: DISCONTINUED | OUTPATIENT
Start: 2022-01-03 | End: 2022-01-03

## 2022-01-03 RX ADMIN — SODIUM CHLORIDE 1000 ML: 0.9 INJECTION, SOLUTION INTRAVENOUS at 11:05

## 2022-01-03 RX ADMIN — SODIUM CHLORIDE, PRESERVATIVE FREE 10 ML: 5 INJECTION INTRAVENOUS at 08:17

## 2022-01-03 RX ADMIN — DONEPEZIL HYDROCHLORIDE 5 MG: 5 TABLET, FILM COATED ORAL at 22:04

## 2022-01-03 RX ADMIN — ASPIRIN 81 MG: 81 TABLET, COATED ORAL at 22:04

## 2022-01-03 RX ADMIN — ENOXAPARIN SODIUM 30 MG: 100 INJECTION SUBCUTANEOUS at 10:15

## 2022-01-03 RX ADMIN — ENOXAPARIN SODIUM 30 MG: 100 INJECTION SUBCUTANEOUS at 22:04

## 2022-01-03 RX ADMIN — MIRTAZAPINE 15 MG: 15 TABLET, FILM COATED ORAL at 22:04

## 2022-01-03 RX ADMIN — ATORVASTATIN CALCIUM 40 MG: 40 TABLET, FILM COATED ORAL at 10:15

## 2022-01-03 RX ADMIN — MICONAZOLE NITRATE: 2 POWDER TOPICAL at 10:17

## 2022-01-03 RX ADMIN — METOPROLOL 25 MG: 25 TABLET ORAL at 10:16

## 2022-01-03 ASSESSMENT — PAIN SCALES - GENERAL
PAINLEVEL_OUTOF10: 0

## 2022-01-03 NOTE — FLOWSHEET NOTE
01/03/22 1100   Encounter Summary   Services provided to: Patient   Referral/Consult From: Rounding   Continue Visiting   (1/3, juanjose )   Complexity of Encounter High   Length of Encounter 30 minutes   Routine   Type Initial   Assessment Unable to respond   Intervention Sustaining presence/ Ministry of presence   Outcome De-escalated   Staff Yesica Gonzalez, MA

## 2022-01-03 NOTE — PROGRESS NOTES
Spoke with pts daughter; pt uses ketoconazole ointment for erythematous/scaled skin in shannon area/inguinal folds.  Daughter would like pt to be able to stand/ambulate in order to safely transition them back home

## 2022-01-03 NOTE — PROGRESS NOTES
[facility]  Cardiology Inpatient Consult Service  Daily Progress Note        Admit Date:  12/31/2021    Referring Physician: Sarath Driscoll DO    Reason for Consultation/Chief Complaint:   Atrial fibrillation. Subjective: Interval history:  No acute overnight events from cardiovascular perspective. Medications:   miconazole   Topical BID    metoprolol tartrate  25 mg Oral BID    sodium chloride flush  5-40 mL IntraVENous 2 times per day    enoxaparin  30 mg SubCUTAneous BID    mirtazapine  15 mg Oral Nightly    donepezil  5 mg Oral Nightly    atorvastatin  40 mg Oral Daily    aspirin  81 mg Oral Nightly       IV drips:   sodium chloride 25 mL (01/01/22 1000)       PRN:  sodium chloride flush, sodium chloride, ondansetron **OR** ondansetron, polyethylene glycol, acetaminophen **OR** acetaminophen      Objective:     Vitals:    01/02/22 2111 01/03/22 0028 01/03/22 0300 01/03/22 0826   BP:  (!) 150/64 (!) 157/68 (!) 159/70   Pulse: 58 57 59 60   Resp: 20 18 12 16   Temp:  96.6 °F (35.9 °C) 98 °F (36.7 °C) 96.7 °F (35.9 °C)   TempSrc:  Oral Oral Oral   SpO2:  98% 96% 92%   Weight:       Height:           Intake/Output Summary (Last 24 hours) at 1/3/2022 0947  Last data filed at 1/3/2022 0300  Gross per 24 hour   Intake 600 ml   Output 650 ml   Net -50 ml     I/O last 3 completed shifts: In: 600 [P.O.:600]  Out: 650 [Urine:650]  Wt Readings from Last 3 Encounters:   12/31/21 169 lb 15.6 oz (77.1 kg)   12/15/21 176 lb (79.8 kg)   11/17/21 184 lb (83.5 kg)       Admit Wt: Weight: 169 lb 15.6 oz (77.1 kg)   Todays Wt: Weight: 169 lb 15.6 oz (77.1 kg)    TELEMETRY: Personally interpreted Sinus     Physical Exam:      General appearance: No apparent distress, appears stated age and cooperative. HEENT: Pupils equal, round, and reactive to light. Conjunctivae/corneas clear. Neck: Supple, with full range of motion. No jugular venous distention. Trachea midline.   Respiratory: Normal respiratory effort. Clear to auscultation, bilaterally without Rales/Wheezes/Rhonchi. Cardiovascular: Regular rate and rhythm with normal S1/S2 without murmurs, rubs or gallops. Abdomen: Soft, non-tender, non-distended with normal bowel sounds. Musculoskelatal: No clubbing, cyanosis or edema bilaterally. Skin: Skin color, texture, turgor normal.  No rashes or lesions. Neurologic:  Cranial nerves: II-XII intact, grossly non-focal.  Psychiatric: Alert and oriented, thought content appropriate, normal insight      Objective:  Vital signs: (most recent): Blood pressure (!) 159/70, pulse 60, temperature 96.7 °F (35.9 °C), temperature source Oral, resp. rate 16, height 5' 7\" (1.702 m), weight 169 lb 15.6 oz (77.1 kg), SpO2 92 %. Labs:   Recent Labs     01/01/22  0608 01/02/22  0555 01/03/22  0602    139 142   K 3.4* 4.0 3.8   BUN 23* 22* 22*   CREATININE 0.9 0.8 0.8    105 106   CO2 26 29 28   GLUCOSE 89 88 91   CALCIUM 8.4 8.3 8.2*   MG 1.90  --   --      Recent Labs     01/01/22  0608 01/01/22  0608 01/02/22  0555 01/02/22  0555 01/03/22  0602   WBC 4.8  --  3.8*  --  3.2*   HGB 14.2  --  13.9  --  13.9   HCT 43.0  --  41.5  --  42.4     --  208  --  205   MCV 87.4   < > 87.2   < > 88.4    < > = values in this interval not displayed. No results for input(s): CHOLTOT, TRIG, HDL, LDL in the last 72 hours. Invalid input(s): LIPIDCOMM, CHOLHDL, VLDCHOL  No results for input(s): PTT, INR in the last 72 hours. Invalid input(s): PT  No results for input(s): CKTOTAL, CKMB, CKMBINDEX, TROPONINI in the last 72 hours. No results for input(s): BNP in the last 72 hours. No results for input(s): NTPROBNP in the last 72 hours. No results for input(s): TSH in the last 72 hours. Imaging:   I personally reviewed imaging studies including CXR, Stress test, TTE/WOLFGANG.       Assessment & Plan:     Paroxysmal atrial fibrillation, now in NSR  Likely triggered by Covid  -Remains in NSR  -Continue metoprolol   -If she goes back into AF, please start anticoagulation  -Consider adding second antihypertensive such as ACE, ARB given she remains hypertensive    Inis MD Roberta  PGY-3  Internal Medicine    Will discuss with [facility].  Gerardo Ahs MD, [facility]    1/3/2022 9:47 AM

## 2022-01-03 NOTE — CARE COORDINATION
Case Management Note    Patient unavailable at this time for assessment      CM attempted to meet with patient for completion of initial face to face assessment at this time. Consult noted for assistance with DC planning. Patient currently in [facility] isolation, has dementia, unable to reach family for full assessment at this time. CM has reviewed chart and noted patient is from home with daughter and son in law, had code called this AM. Patient currently out of restraints this afternoon. CM will continue to follow and reach family for further discharge planning and full assessment. CM will follow up when patient available and will complete initial assessment and assist with needs for discharge.     Uriel Weinstein RN  [facility]  Case Management Department  Ph: [phone]  Fax: [phone]

## 2022-01-03 NOTE — SIGNIFICANT EVENT
Rapid response called at 10:41. For code blue. On arrival, patient had pulse. Patient spontaneously breathing, /75, P 72, RR 12, sating 98% room air. Appeared NSR on monitor. Patient able to respond to some questions and followed some simple commands but lethargic. Oriented to self only. On exam, she is clammy, RRR, lungs CTAB, abdomen soft nontender, no LE edema. ABG, CMP, CK, trops, D-dimer, lactic acid and EKG ordered. POC glucose checked and was 178. Lactic elevated at 4.16. 1 L NS ordered. CT head wo contrast also ordered. EKG was NSR with no ischemic changes. Concern for symptomatic bradycardia. Will follow up results.

## 2022-01-03 NOTE — PROGRESS NOTES
Occupational Therapy   Occupational Therapy Initial Assessment/Treatment  Date: 1/3/2022   Patient Name: Ronnie Frank  MRN: [id number]     : 1940    Date of Service: 1/3/2022    Discharge Recommendations:    Ronnie Frank scored a 17/24 on the AM-PAC ADL Inpatient form. Current research shows that an AM-PAC score of 17 or less is typically not associated with a discharge to the patient's home setting. Based on the patient's AM-PAC score and their current ADL deficits, it is recommended that the patient have 3-5 sessions per week of Occupational Therapy at d/c to increase the patient's independence. Please see assessment section for further patient specific details. If patient discharges prior to next session this note will serve as a discharge summary. Please see below for the latest assessment towards goals. OT Equipment Recommendations  Equipment Needed: No    Assessment   Performance deficits / Impairments: Decreased functional mobility ; Decreased ADL status; Decreased balance;Decreased endurance  Assessment: Pt presents with a decline in functional independence. Pt is from home with family, but her functrional baseline is unclear. Currently, pt is requiring max assist for lower body dressing and CG for mobility. Feel pt would benefit from further OT services. Treatment Diagnosis: Impaired ADL and functional mobility  Prognosis: Good  Decision Making: Medium Complexity  OT Education: OT Role;Plan of Care;Transfer Training  Patient Education: Needs reinforcement  REQUIRES OT FOLLOW UP: Yes  Activity Tolerance  Activity Tolerance: Patient Tolerated treatment well  Safety Devices  Safety Devices in place: Yes  Type of devices: Left in chair;Call light within reach; Chair alarm in place;Nurse notified (meal tray in place)         Treatment Diagnosis: Impaired ADL and functional mobility      Restrictions  Position Activity Restriction  Other position/activity restrictions: up as tolerated, droplet plus precautions    Subjective   General  Chart Reviewed: Yes  Additional Pertinent Hx: Pt admitted 12/31/21 from her nursing facility for evaluation of right hip pain status post fall. Pt found to be in rapid A-Fib.    x-ray Rt hip/pelvis (-)    CT Rt hip= No acute findings in the right hip. Advanced degenerative arthropathy of the right hip. Head CT (-)   CT c-spine (-)         PMH includes: HTN, dementia, COVID-19, anxiety, PAD  Family / Caregiver Present: No  Referring Practitioner: Dr. Ezio Goldstein  Diagnosis: New onset A-Fib  Subjective  Subjective: Pt in bed upon entry. I need to shave my legs. Patient Currently in Pain: Denies  Social/Functional History  Social/Functional History  Lives With: Daughter (son-in-law)  Bathroom Shower/Tub: Tub/Shower unit  Bathroom Equipment: Grab bars in [facility] chair  Home Equipment: Cane  ADL Assistance: [address] [place] [address] Avenue: Needs assistance  Ambulation Assistance: Independent (cane)  Additional Comments: Above information is from pt who is a poor historian. Objective   Vision: Impaired (glassess)  Hearing: Within functional limits    Orientation  Overall Orientation Status: Impaired  Orientation Level: Oriented to person;Disoriented to situation;Disoriented to place     Balance  Sitting Balance: Stand by assistance  Standing Balance: Contact guard assistance  Standing Balance  Time: ~3 min  Activity: bathroom mobility/activity  Functional Mobility  Functional - Mobility Device: Rolling Walker  Activity: To/from bathroom  Assist Level: Contact guard assistance (+ cues for walker use)  Toilet Transfers  Toilet - Technique: Ambulating  Equipment Used: Standard toilet (grab bar)  Toilet Transfer: Contact guard assistance  ADL  Feeding: Independent  LE Dressing: Maximum assistance  Toileting:  Moderate assistance  Tone RUE  RUE Tone: Normotonic  Tone LUE  LUE Tone: Normotonic  Coordination  Movements Are Fluid And Coordinated: Yes     Bed mobility  Supine to Sit: Stand by assistance (HOB elevated and use of rail)  Scooting: Contact guard assistance  Transfers  Stand Step Transfers: Contact guard assistance  Sit to stand: Contact guard assistance  Stand to sit: Contact guard assistance     Cognition  Overall Cognitive Status:  (Baseline dementia)                 LUE AROM (degrees)  LUE AROM : WFL  RUE AROM (degrees)  RUE AROM : WFL  LUE Strength  Gross LUE Strength: WFL  RUE Strength  Gross RUE Strength: WFL     Hand Dominance  Hand Dominance: Right     Treatment included ADL and transfer training.         Plan   Plan  Times per week: 2-5  Current Treatment Recommendations: Strengthening,ROM,Balance Training,Functional Mobility Training,Endurance Training,Self-Care / ADL,Cognitive Reorientation      AM-PAC Score        AM-[facility] Inpatient Daily Activity Raw Score: 17 (01/03/22 1044)  AM-PAC Inpatient ADL T-Scale Score : 37.26 (01/03/22 1044)  ADL Inpatient CMS 0-100% Score: 50.11 (01/03/22 1044)  ADL Inpatient CMS G-Code Modifier : CK (01/03/22 1044)    Goals                          No goals met  Short term goals  Time Frame for Short term goals: Discharge  Short term goal 1: Transfer to/from toilet with SBA  Short term goal 2: Stance with SBA x6 min while engaging in ADL/functiuonal mobility  Short term goal 3: Lower body dressing with min assist  Patient Goals   Patient goals : Go home       Therapy Time   Individual Concurrent Group Co-treatment   Time In 9841         Time Out 1014         Minutes 43         Timed Code Treatment Minutes: [address], OTR/L [address]

## 2022-01-03 NOTE — PROGRESS NOTES
Hospitalist Progress Note      PCP: Bret Albright DO    Date of Admission: 12/31/2021    Chief Complaint: Fall, hip pain    Hospital Course: Found to have new onset Afib, covid19 infection without hypoxemia, currently in SR. Subjective:   Pt had epsidoe of syncope earlier, code called however pt retained pulse and pressure. Was minimally responsive fpr a period, became bradycardic. Now improved but a little more lethargic than earlier. No CP no SOB. No acute ischemia on ekg, troponins negative. Cardiology following, continues in [address]. Echo pending. Medications:  Reviewed    Infusion Medications    sodium chloride 25 mL (01/01/22 1000)     Scheduled Medications    miconazole   Topical BID    metoprolol tartrate  25 mg Oral BID    sodium chloride flush  5-40 mL IntraVENous 2 times per day    enoxaparin  30 mg SubCUTAneous BID    mirtazapine  15 mg Oral Nightly    donepezil  5 mg Oral Nightly    atorvastatin  40 mg Oral Daily    aspirin  81 mg Oral Nightly     PRN Meds: perflutren lipid microspheres, sodium chloride flush, sodium chloride, ondansetron **OR** ondansetron, polyethylene glycol, acetaminophen **OR** acetaminophen      Intake/Output Summary (Last 24 hours) at 1/3/2022 1451  Last data filed at 1/3/2022 0300  Gross per 24 hour   Intake 280 ml   Output 650 ml   Net -370 ml       Exam:    BP (!) 154/63   Pulse 63   Temp 97.2 °F (36.2 °C) (Oral)   Resp 16   Ht 5' 7\" (1.702 m)   Wt 169 lb 15.6 oz (77.1 kg)   SpO2 95%   BMI 26.62 kg/m²     General appearance: No apparent distress, appears stated age and cooperative. HEENT: Pupils equal, round, and reactive to light. Conjunctivae/corneas clear. Neck: Supple, with full range of motion. No jugular venous distention. Trachea midline. Respiratory:  Normal respiratory effort. Clear to auscultation, bilaterally without Rales/Wheezes/Rhonchi.   Cardiovascular: Regular rate and rhythm with normal S1/S2 without murmurs, rubs or gallops. Abdomen: Soft, non-tender, non-distended with normal bowel sounds. Musculoskelatal: No clubbing, cyanosis or edema bilaterally. Skin: Skin color, texture, turgor normal.  No rashes or lesions. Neurologic:  Cranial nerves: II-XII intact, grossly non-focal.  Psychiatric: Alert and oriented, thought content appropriate, normal insight    Labs:   Recent Labs     01/01/22  0608 01/02/22  0555 01/03/22  0602   WBC 4.8 3.8* 3.2*   HGB 14.2 13.9 13.9   HCT 43.0 41.5 42.4    208 205     Recent Labs     01/02/22  0555 01/03/22  0602 01/03/22  1102    142 139   K 4.0 3.8 3.7    106 103   CO2 29 28 23   BUN 22* 22* 22*   CREATININE 0.8 0.8 0.8   CALCIUM 8.3 8.2* 8.8     Recent Labs     01/02/22  0555 01/03/22  0602 01/03/22  1102   AST 55* 44* 53*   ALT 29 28 33   BILITOT 0.6 0.7 0.9   ALKPHOS 120 115 132*     No results for input(s): INR in the last 72 hours. Recent Labs     01/03/22  1102   CKTOTAL 410*   TROPONINI <0.01       Studies:  CT HEAD WO CONTRAST   Final Result      1. Stable exam with no acute intracranial abnormality. CT HIP RIGHT WO CONTRAST   Final Result     No acute findings in the right hip. Advanced degenerative    arthropathy of the right hip. CT CERVICAL SPINE WO CONTRAST   Final Result     No acute findings in the cervical spine. CT HEAD WO CONTRAST   Final Result     No acute findings in the head/brain. XR HIP 2-3 VW W PELVIS RIGHT   Final Result      Severe degenerative arthritis of the hip joints bilaterally. No acute findings. XR CHEST PORTABLE   Final Result      No acute cardiopulmonary findings.              Assessment/Plan:    Active Hospital Problems    Diagnosis Date Noted    New onset a-fib [facility]) [I48.91] 12/31/2021    Acute metabolic encephalopathy [Y80.89] 12/31/2021    PAD (peripheral artery disease) ([facility] [address] [I73.9] 02/26/2019    Essential hypertension [I10] 02/26/2019    Hyperlipidemia [E78.5] 09/21/2005 Syncope - etiology not clear, now stable. Following directions, getting echo. Head ct neg, cont to monitor. Covid19 infection - Pt has a reinfection case of COVID-19, having already recovered from a COVID-19 infection in October of this year, and now developing symptoms yesterday and testing positive as an outpatient, as both her daughter and son-in-law are also currently positive for COVID-19.  -No hypoxia  -monitor for development of worsening symptoms or oxygen requirement  -PCR positive, cont to monitor.      New onset a-fib (HCC) - Bt has been back and forth between A fin and NSR. Cardiology consulted  Now in NSR  If remains in NSR, no a/c  Start Lopressor, BP and HR high while in NSR     PAD (peripheral artery disease) ([facility] [address] - continue ASA and Statin     Acute metabolic encephalopathy - said to be worse than her baseline dementia. Possibly due to illness. We will monitor and provide supportive care     Essential (primary) hypertension  Not on home medications  Lopressor added     Hyperlipidemia  Statin       DVT Prophylaxis: Low intensity Enoxaparin   Diet: ADULT DIET; Regular;  No Added Salt (3-4 gm)  Code Status: Full Code    PT/OT Eval Status:     Dispo - possible dc 1-2 days    Janessa Child DO

## 2022-01-03 NOTE — PROGRESS NOTES
Physical Therapy    Facility/Department: [place] [address] [facility]  Initial Assessment / treatment    NAME: Genny Marte  : 1940  MRN: [id number]    Date of Service: 1/3/2022    Discharge Recommendations: Genny Marte scored a 17/24 on the AM-PAC short mobility form. Current research shows that an AM-PAC score of 17 or less is typically not associated with a discharge to the patient's home setting. Based on the patient's AM-PAC score and their current functional mobility deficits, it is recommended that the patient have 3-5 sessions per week of Physical Therapy at d/c to increase the patient's independence. Please see assessment section for further patient specific details. If patient discharges prior to next session this note will serve as a discharge summary. Please see below for the latest assessment towards goals. PT Equipment Recommendations  Other: rec rolling walker if pt returns home and does not already have one    Assessment   Body structures, Functions, Activity limitations: Decreased functional mobility   Assessment: Pt is [de-identified] y.o. female admit with new onset a-fib. Pt is from home with family and is a poor historian. Pt requires CGA to SBA for safe mobility today. Requires use of RW for safe amb. Safety concerns if pt returns home alone and unsure if family is able to be with her during the day. Pt appears to be below her functional baseline. Rec continued IP PT. If pt returns home, rec 24hr assist and home PT. Will follow  Treatment Diagnosis: impaired gait and transfers  Prognosis: Good  Decision Making: Medium Complexity  PT Education: PT Role;Transfer Training;Functional Mobility Training  Patient Education: pt demos partial understanding; rec continued reinforcement  REQUIRES PT FOLLOW UP: Yes       Patient Diagnosis(es): The primary encounter diagnosis was New onset atrial fibrillation ([facility] [address]  Diagnoses of COVID-19 virus infection, Forehead contusion, initial encounter, Contusion of right hip, initial encounter, and Fall in home, initial encounter were also pertinent to this visit. has a past medical history of Anxiety, Chronic renal insufficiency, COVID-19 virus infection, Dementia ([address] Hyperlipidemia, Hypertension, Peripheral arterial disease ([facility] [address] and Prediabetes. has no past surgical history on file. Restrictions  Position Activity Restriction  Other position/activity restrictions: up as tolerated, droplet plus precautions  Vision/Hearing  Vision: Impaired (glasses)  Hearing: Within functional limits     Subjective  General  Chart Reviewed: Yes  Additional Pertinent Hx: Admit 12/31 s/p fall; Head CT, c-spine CT, and R hip CT all neg for acute injury; (+) new onset a-fib; (+) COVID; PMHx: of anxiety, chronic kidney disease, dementia, hyperlipidemia, hypertension, prediabetes and peripheral arterial disease  Referring Practitioner: DO Mateusz  Diagnosis: new onset a-fib  Subjective  Subjective: Pt found supine in bed. Agreeable to PT with min encouragement. Denies pain. Confused per conversation. Pain Screening  Patient Currently in Pain: Denies          Orientation  Orientation  Overall Orientation Status: Impaired (oriented to name and birth month and date but not birth year)  Social/Functional History  Social/Functional History  Lives With: Daughter (son-in-law)  Bathroom Shower/Tub: Tub/Shower unit  Bathroom Equipment: Grab bars in shower,Shower chair  Home Equipment: Cane  ADL Assistance: [address] [place] [address] Avenue: Needs assistance  Ambulation Assistance: Independent (cane)  Additional Comments: Above information is from pt who is a poor historian.   Cognition        Objective          AROM RLE (degrees)  RLE General AROM: hip ext limited at least 30 deg as observed in stance with walker; knee and ankle ROM WFL  AROM LLE (degrees)  LLE General AROM: hip ext limited at least 30 deg as observed in stance with walker; knee and ankle ROM WFL  Strength RLE  Comment: grossly 3+/5 throughout  Strength LLE  Comment: grossly 3+/5 throughout        Bed mobility  Supine to Sit: Stand by assistance (HOB elevated, use of rail)  Scooting: Contact guard assistance  Transfers  Sit to Stand: Contact guard assistance (from EOB, toilet)  Stand to sit: Contact guard assistance  Ambulation  Ambulation?: Yes  Ambulation 1  Device: Rolling Walker  Assistance: Contact guard assistance  Quality of Gait: decreased krystle and effortful, flexed hips, cues for safe walker management, unsteady but no overt LOB  Distance: 10 ft x 2     Balance  Sitting - Static: Good  Sitting - Dynamic: Good  Standing - Static: Fair  Standing - Dynamic: Fair      Treatment included gait and transfer training, pt education.   Plan   Plan  Times per week: 2-5  Current Treatment Recommendations: Strengthening,Functional Mobility Training,Transfer Training,Balance Training,Endurance Training,Gait Training,Equipment Evaluation, Education, & procurement,Patient/Caregiver Education & Training  Safety Devices  Type of devices: Call light within reach,Chair alarm in place,Nurse notified,Gait belt,Left in chair (RN states pt ok to leave bilateral soft wrist restraints off - breakfast in room and pt eating.)    G-Code       OutComes Score                                                  AM-PAC Score  AM-PAC Inpatient Mobility Raw Score : 17 (01/03/22 1205)  AM-PAC Inpatient T-Scale Score : 42.13 (01/03/22 1205)  Mobility Inpatient CMS 0-100% Score: 50.57 (01/03/22 1205)  Mobility Inpatient CMS G-Code Modifier : CK (01/03/22 1205)          Goals  Short term goals  Time Frame for Short term goals: discharge  Short term goal 1: Pt will transfer supine <--> sit with supervision  Short term goal 2: Pt will transfer sit <--> stand with supervision  Short term goal 3: Pt will amb 50 ft with RW and SBA  Patient Goals   Patient goals : not stated       Therapy Time   Individual Concurrent Group Co-treatment   Time In 0932         Time Out 1010         Minutes 38               Timed Code Treatment Minutes:  23    Total Treatment Minutes:  38    If patient is discharged prior to next treatment, this note will serve as the discharge summary.   Ben Moseley, PT, DPT  [id number]

## 2022-01-03 NOTE — PROGRESS NOTES
approx 1030 patient tried to get out of chair by herself, this RN assisted patient to bathroom, approx 1036 patient returned back to chair when patient started complaining of being hot and lightheaded, rapid response called when patients eyes rolled into the back of her head and she started shaking. Patient lifted back to the bed, unconscious, not breathing, code blue called, compressions started and stopped when patient regained consciousness. patient remains lethargic.  HUC states patients HR hit 36

## 2022-01-03 NOTE — PLAN OF CARE
Problem: Risk for Fluid Volume Deficit  Goal: Maintain normal heart rhythm  Outcome: Ongoing  Note: Pt sinus rhythm/bertrand throughout shift. VSS       Problem: Body Temperature -  Risk of, Imbalanced  Goal: Ability to maintain a body temperature within defined limits  Outcome: Met This Shift afebrile throughout shift     Problem: Gas Exchange - Impaired  Goal: Absence of hypoxia  1/3/2022 0222 by Sabrina Funez RN  Outcome: Met This Shift- Sp02 >94% on RA throughout shift. Respirations are even and unlabored at a rate of 12-24/min. Problem: Pain:  Goal: Pain level will decrease  Description: Pain level will decrease  Outcome: Met This Shift  Note: PT turned and repositioned as needed to promote comfort; PRN 650mg tylenol given x1 for back pain; pt asleep with rr>12 thereafter      Problem: Non-Violent Restraints  Goal: Removal from restraints as soon as assessed to be safe  1/3/2022 0222 by Sabrina Funez RN  Outcome: Ongoing    Note: Pt remains in bilat soft wrist restraints; briefly trialed out of restraints while in room and pt began to pull at lines/tubing.  Turned/cleaned/repositioned, offered fluids/food during bihourly rounding

## 2022-01-04 VITALS
DIASTOLIC BLOOD PRESSURE: 64 MMHG | HEART RATE: 93 BPM | HEIGHT: 67 IN | TEMPERATURE: 97.3 F | WEIGHT: 169.97 LBS | OXYGEN SATURATION: 94 % | RESPIRATION RATE: 20 BRPM | SYSTOLIC BLOOD PRESSURE: 152 MMHG | BODY MASS INDEX: 26.68 KG/M2

## 2022-01-04 LAB
A/G RATIO: 1.2 (ref 1.1–2.2)
ALBUMIN SERPL-MCNC: 3.4 G/DL (ref 3.4–5)
ALP BLD-CCNC: 112 U/L (ref 40–129)
ALT SERPL-CCNC: 33 U/L (ref 10–40)
ANION GAP SERPL CALCULATED.3IONS-SCNC: 9 MMOL/L (ref 3–16)
AST SERPL-CCNC: 46 U/L (ref 15–37)
BASOPHILS ABSOLUTE: 0 K/UL (ref 0–0.2)
BASOPHILS RELATIVE PERCENT: 0.6 %
BILIRUB SERPL-MCNC: 0.8 MG/DL (ref 0–1)
BUN BLDV-MCNC: 21 MG/DL (ref 7–20)
CALCIUM SERPL-MCNC: 8.2 MG/DL (ref 8.3–10.6)
CHLORIDE BLD-SCNC: 108 MMOL/L (ref 99–110)
CO2: 25 MMOL/L (ref 21–32)
CORTISOL TOTAL: 17 UG/DL
CREAT SERPL-MCNC: 0.8 MG/DL (ref 0.6–1.2)
EOSINOPHILS ABSOLUTE: 0 K/UL (ref 0–0.6)
EOSINOPHILS RELATIVE PERCENT: 0.4 %
GFR AFRICAN AMERICAN: >60
GFR NON-AFRICAN AMERICAN: >60
GLUCOSE BLD-MCNC: 86 MG/DL (ref 70–99)
HCT VFR BLD CALC: 40.4 % (ref 36–48)
HEMOGLOBIN: 13.5 G/DL (ref 12–16)
LACTIC ACID: 0.9 MMOL/L (ref 0.4–2)
LACTIC ACID: 0.9 MMOL/L (ref 0.4–2)
LACTIC ACID: 1 MMOL/L (ref 0.4–2)
LACTIC ACID: 1.1 MMOL/L (ref 0.4–2)
LACTIC ACID: 2.3 MMOL/L (ref 0.4–2)
LYMPHOCYTES ABSOLUTE: 1 K/UL (ref 1–5.1)
LYMPHOCYTES RELATIVE PERCENT: 17 %
MCH RBC QN AUTO: 29.2 PG (ref 26–34)
MCHC RBC AUTO-ENTMCNC: 33.4 G/DL (ref 31–36)
MCV RBC AUTO: 87.4 FL (ref 80–100)
MONOCYTES ABSOLUTE: 0.7 K/UL (ref 0–1.3)
MONOCYTES RELATIVE PERCENT: 11.4 %
NEUTROPHILS ABSOLUTE: 4.1 K/UL (ref 1.7–7.7)
NEUTROPHILS RELATIVE PERCENT: 70.6 %
PDW BLD-RTO: 14.5 % (ref 12.4–15.4)
PLATELET # BLD: 198 K/UL (ref 135–450)
PMV BLD AUTO: 7.8 FL (ref 5–10.5)
POTASSIUM REFLEX MAGNESIUM: 3.8 MMOL/L (ref 3.5–5.1)
RBC # BLD: 4.62 M/UL (ref 4–5.2)
SODIUM BLD-SCNC: 142 MMOL/L (ref 136–145)
TOTAL PROTEIN: 6.2 G/DL (ref 6.4–8.2)
WBC # BLD: 5.7 K/UL (ref 4–11)

## 2022-01-04 PROCEDURE — 85025 COMPLETE CBC W/AUTO DIFF WBC: CPT

## 2022-01-04 PROCEDURE — 6360000002 HC RX W HCPCS: Performed by: INTERNAL MEDICINE

## 2022-01-04 PROCEDURE — 6370000000 HC RX 637 (ALT 250 FOR IP): Performed by: INTERNAL MEDICINE

## 2022-01-04 PROCEDURE — 80053 COMPREHEN METABOLIC PANEL: CPT

## 2022-01-04 PROCEDURE — 83605 ASSAY OF LACTIC ACID: CPT

## 2022-01-04 PROCEDURE — 82533 TOTAL CORTISOL: CPT

## 2022-01-04 PROCEDURE — 99233 SBSQ HOSP IP/OBS HIGH 50: CPT | Performed by: INTERNAL MEDICINE

## 2022-01-04 PROCEDURE — 6370000000 HC RX 637 (ALT 250 FOR IP): Performed by: STUDENT IN AN ORGANIZED HEALTH CARE EDUCATION/TRAINING PROGRAM

## 2022-01-04 PROCEDURE — 2580000003 HC RX 258: Performed by: INTERNAL MEDICINE

## 2022-01-04 PROCEDURE — 36415 COLL VENOUS BLD VENIPUNCTURE: CPT

## 2022-01-04 RX ORDER — HYDRALAZINE HYDROCHLORIDE 20 MG/ML
10 INJECTION INTRAMUSCULAR; INTRAVENOUS ONCE
Status: COMPLETED | OUTPATIENT
Start: 2022-01-04 | End: 2022-01-04

## 2022-01-04 RX ORDER — AMLODIPINE BESYLATE 5 MG/1
5 TABLET ORAL DAILY
Status: DISCONTINUED | OUTPATIENT
Start: 2022-01-04 | End: 2022-01-04 | Stop reason: HOSPADM

## 2022-01-04 RX ORDER — AMLODIPINE BESYLATE 5 MG/1
2.5 TABLET ORAL DAILY
Qty: 30 TABLET | Refills: 3 | Status: SHIPPED | OUTPATIENT
Start: 2022-01-05 | End: 2022-01-12

## 2022-01-04 RX ORDER — AMLODIPINE BESYLATE 5 MG/1
2.5 TABLET ORAL DAILY
Qty: 30 TABLET | Refills: 3 | Status: SHIPPED | OUTPATIENT
Start: 2022-01-05 | End: 2022-01-04

## 2022-01-04 RX ORDER — AMLODIPINE BESYLATE 5 MG/1
5 TABLET ORAL DAILY
Qty: 30 TABLET | Refills: 3 | Status: SHIPPED | OUTPATIENT
Start: 2022-01-05 | End: 2022-01-04

## 2022-01-04 RX ADMIN — SODIUM CHLORIDE, PRESERVATIVE FREE 10 ML: 5 INJECTION INTRAVENOUS at 06:44

## 2022-01-04 RX ADMIN — MICONAZOLE NITRATE: 2 POWDER TOPICAL at 07:57

## 2022-01-04 RX ADMIN — ATORVASTATIN CALCIUM 40 MG: 40 TABLET, FILM COATED ORAL at 07:57

## 2022-01-04 RX ADMIN — HYDRALAZINE HYDROCHLORIDE 10 MG: 20 INJECTION INTRAMUSCULAR; INTRAVENOUS at 06:40

## 2022-01-04 RX ADMIN — ENOXAPARIN SODIUM 30 MG: 100 INJECTION SUBCUTANEOUS at 07:57

## 2022-01-04 RX ADMIN — METOPROLOL 25 MG: 25 TABLET ORAL at 07:57

## 2022-01-04 RX ADMIN — SODIUM CHLORIDE, PRESERVATIVE FREE 10 ML: 5 INJECTION INTRAVENOUS at 07:56

## 2022-01-04 RX ADMIN — AMLODIPINE BESYLATE 5 MG: 5 TABLET ORAL at 11:25

## 2022-01-04 ASSESSMENT — PAIN SCALES - GENERAL
PAINLEVEL_OUTOF10: 0

## 2022-01-04 NOTE — CARE COORDINATION
Addendum: Discharge order noted. Home care orders to be faxed for resumption of care. Case Management Assessment           Initial Evaluation                Date / Time of Evaluation: 1/4/2022 9:45 AM                 Assessment Completed by: Zaira Wiley RN    Patient Name: Aristides Abel     YOB: 1940  Diagnosis: New onset atrial fibrillation ([facility] [address] [I48.91]  New onset a-fib ([address] [I48.91]  Contusion of right hip, initial encounter [S70.01XA]  Forehead contusion, initial encounter Kely Turner  Fall in home, initial encounter [W19. XXXA, W88.099]  COVID-19 virus infection [U07.1]     Date / Time: 12/31/2021 12:18 AM    Patient Admission Status: Inpatient    If patient is discharged prior to next notation, then this note serves as note for discharge by case management. Current PCP: Gilberto Smith DO  Clinic Patient: No    Chart Reviewed: Yes  Patient/ Family Interviewed: Yes    Initial assessment completed at bedside with: daughter over the phone as pt is in [facility] and has dementia.     Hospitalization in the last 30 days: No    Emergency Contacts:  Extended Emergency Contact Information  Primary Emergency Contact: Gucciwolfgangmisty  Phone: [phone]  Relation: Child    Advance Directives:   Code Status: Full [address]: No    Financial  Payor: MEDICARE / Plan: MEDICARE PART A AND B / Product Type: *No Product type* /     Pre-cert required for SNF: No    Pharmacy    [id number] [address] [phone] [address] [facility]  Phone: [phone] Fax: [address], [address] [phone] Lianet Small [phone]  [facility] [address] [facility] [address]  Phone: [phone] Fax: [phone]      Potential assistance Purchasing Medications:    Does Patient want to participate in local refill/ meds to beds program?: Meds To Beds General Rules:  1. Can ONLY be done Monday- Friday between 8:30am-5pm  2. Prescription(s) must be in pharmacy by 3pm to be filled same day  3. Copy of patient's insurance/ prescription drug card and patient face sheet must be sent along with the prescription(s)  4. Cost of Rx cannot be added to hospital bill. If financial assistance is needed, please contact unit  or ;  or  CANNOT provide pharmacy voucher for patients co-pays  5. Patients can then  the prescription on their way out of the hospital at discharge, or pharmacy can deliver to the bedside if staff is available. (payment due at time of pick-up or delivery - cash, check, or card accepted)     Able to afford home medications/ co-pay costs: Yes    ADLS  Support Systems: Children    PT AM-PAC: 17 /24  OT AM-PAC: 17 /24    HOUSING  Home Environment: From home with daughter and her    Steps: one to enter    Plans to RETURN to current housing: Yes  Barriers to RETURNING to current housing: medical complications     Wilber Dean 79  Currently ACTIVE with [email or web address]: Yes  [address]: [facility]  Phone: [phone]  Fax: [phone]    Currently ACTIVE with [facility]: No    Durable Medical Equipment  DME Provider: n/a  Equipment: walker and bath bench    Home Oxygen and [address] prior to admission: No  Oxygen Company: Not Applicable    DISCHARGE PLAN:  Disposition: Home with [email or web address]: [address] [facility] [address] for discharge: family     Factors facilitating achievement of predicted outcomes: Family support, Caregiver support and Has needed Durable Medical Equipment at home    Barriers to discharge: Cognitive deficit, Decreased endurance, Medical complications and Medication managment    Additional Case Management Notes: CM spoke with daughter over the phone, plan is for pt to return home with [facility].  Family to transport. Both daughter and her  also have COVID. The Plan for Transition of Care is related to the following treatment goals of New onset atrial fibrillation ([address] [I48.91]  New onset a-fib ([address] [I48.91]  Contusion of right hip, initial encounter [S70.01XA]  Forehead contusion, initial encounter Yolanda Garcia  Fall in home, initial encounter [W19. Cari Bring, W05.989]  COVID-19 virus infection [U07.1]    The Patient and/or patient representative Hao Hope and her family were provided with a choice of provider and agrees with the discharge plan Yes    Freedom of choice list was provided with basic dialogue that supports the patient's individualized plan of care/goals and shares the quality data associated with the providers.  Yes    Care Transition patient: Yes    Shar Sanderson RN  [facility]  Case Management Department  Ph: [phone]   Fax: [phone]

## 2022-01-04 NOTE — PROGRESS NOTES
Pt daughter [place] allis updated on POC and all questions answered.      Electronically signed by Kin Ramos RN on 1/3/2022 at 10:21 PM

## 2022-01-04 NOTE — PROGRESS NOTES
Bedside RN given instructions on how to place Cardiac Monitor due to pt being covid positive. Cam monitor La Nena Kyle will be placed on pt during discharge.

## 2022-01-04 NOTE — PROGRESS NOTES
Peripheral IV in LAC infiltrated; swelling noted to site (ice placed to area). PICC RN to evaluate pt for midline placement when able due to lab also having difficulty obtaining pt's blood. Perfect Serve message sent to Dr. Meyer Like to notify of above information.  Electronically signed by Alondra Kingston RN on 1/4/2022 at 12:06 PM

## 2022-01-04 NOTE — PROGRESS NOTES
[facility]  Cardiology Inpatient Consult Service  Daily Progress Note        Admit Date:  12/31/2021    Referring Physician: Manford Kehr, MD    Reason for Consultation/Chief Complaint: Atrial fibrillation    Subjective: Interval history:  Per documentation, syncopal episode yesterday seems to be vasovagal in nature - on review of tele, patient was tachycardic then became bradycardic and briefly unresponsive - had pulse and BP by the time code team arrived. No acute overnight events     Medications:   amLODIPine  5 mg Oral Daily    miconazole   Topical BID    metoprolol tartrate  25 mg Oral BID    sodium chloride flush  5-40 mL IntraVENous 2 times per day    enoxaparin  30 mg SubCUTAneous BID    mirtazapine  15 mg Oral Nightly    donepezil  5 mg Oral Nightly    atorvastatin  40 mg Oral Daily    aspirin  81 mg Oral Nightly       IV drips:   sodium chloride 25 mL (01/01/22 1000)       PRN:  perflutren lipid microspheres, sodium chloride flush, sodium chloride, ondansetron **OR** ondansetron, polyethylene glycol, acetaminophen **OR** acetaminophen      Objective:     Vitals:    01/04/22 0400 01/04/22 0505 01/04/22 0640 01/04/22 0751   BP:  (!) 173/73 (!) 176/70 (!) 152/66   Pulse: 89 89  95   Resp:  16  16   Temp:  98 °F (36.7 °C)  98.8 °F (37.1 °C)   TempSrc:  Oral  Oral   SpO2:  93%  96%   Weight:       Height:           Intake/Output Summary (Last 24 hours) at 1/4/2022 0907  Last data filed at 1/4/2022 0751  Gross per 24 hour   Intake 250 ml   Output 0 ml   Net 250 ml     I/O last 3 completed shifts:   In: 120 [P.O.:120]  Out: 0   Wt Readings from Last 3 Encounters:   12/31/21 169 lb 15.6 oz (77.1 kg)   12/15/21 176 lb (79.8 kg)   11/17/21 184 lb (83.5 kg)       Admit Wt: Weight: 169 lb 15.6 oz (77.1 kg)   Todays Wt: Weight: 169 lb 15.6 oz (77.1 kg)    TELEMETRY: Personally interpreted Sinus     Physical Exam:      General appearance: No apparent distress, appears stated age and cooperative. HEENT: Pupils equal, round, and reactive to light. Conjunctivae/corneas clear. Neck: Supple, with full range of motion. No jugular venous distention. Trachea midline. Respiratory:  Normal respiratory effort. Clear to auscultation, bilaterally without Rales/Wheezes/Rhonchi. Cardiovascular: Regular rate and rhythm with normal S1/S2 without murmurs, rubs or gallops. Abdomen: Soft, non-tender, non-distended with normal bowel sounds. Musculoskelatal: No clubbing, cyanosis or edema bilaterally. Skin: Skin color, texture, turgor normal.  No rashes or lesions. Neurologic:  Cranial nerves: II-XII intact, grossly non-focal.  Psychiatric: Alert and oriented, thought content appropriate, normal insight      Objective:  Vital signs: (most recent): Blood pressure (!) 152/66, pulse 95, temperature 98.8 °F (37.1 °C), temperature source Oral, resp. rate 16, height 5' 7\" (1.702 m), weight 169 lb 15.6 oz (77.1 kg), SpO2 96 %. Labs:   Recent Labs     01/03/22  0602 01/03/22  1102 01/04/22  0303    139 142   K 3.8 3.7 3.8   BUN 22* 22* 21*   CREATININE 0.8 0.8 0.8    103 108   CO2 28 23 25   GLUCOSE 91 163* 86   CALCIUM 8.2* 8.8 8.2*     Recent Labs     01/02/22  0555 01/02/22  0555 01/03/22  0602 01/03/22  0602 01/04/22  0303   WBC 3.8*  --  3.2*  --  5.7   HGB 13.9  --  13.9  --  13.5   HCT 41.5  --  42.4  --  40.4     --  205  --  198   MCV 87.2   < > 88.4   < > 87.4    < > = values in this interval not displayed. No results for input(s): CHOLTOT, TRIG, HDL, LDL in the last 72 hours. Invalid input(s): LIPIDCOMM, CHOLHDL, VLDCHOL  No results for input(s): PTT, INR in the last 72 hours. Invalid input(s): PT  Recent Labs     01/03/22  1530 [address] [facility] <0.01     No results for input(s): BNP in the last 72 hours. No results for input(s): NTPROBNP in the last 72 hours. No results for input(s): TSH in the last 72 hours.     Imaging:   I personally reviewed imaging studies including CXR, Stress test, TTE/WOLFGANG. Assessment & Plan:     Paroxysmal atrial fibrillation, now in NSR / syncope, likely vasovagal  Likely triggered by Covid. ECHO done yesterday after brief episode of syncope was normal  -Remains in NSR  -Continue metoprolol, start amlodipine for better BP control  -If she goes back into AF, please start anticoagulation  -Consider adding second antihypertensive such as ACE, ARB given she remains hypertensive    Hamilton Elliott MD  PGY-3  Internal Medicine    Will discuss with [address] [place] [address].  Stef Lakhani MD, [facility]    1/4/2022 9:07 AM

## 2022-01-04 NOTE — PROGRESS NOTES
Hospitalist Progress Note      PCP: Andres Palmer DO    Date of Admission: 12/31/2021    Chief Complaint: Fall, hip pain    Hospital Course: Found to have new onset Afib, covid19 infection without hypoxemia, currently in SR. Subjective:   No events overnight  Denies any acute complains    Medications:  Reviewed    Infusion Medications    sodium chloride 25 mL (01/01/22 1000)     Scheduled Medications    miconazole   Topical BID    metoprolol tartrate  25 mg Oral BID    sodium chloride flush  5-40 mL IntraVENous 2 times per day    enoxaparin  30 mg SubCUTAneous BID    mirtazapine  15 mg Oral Nightly    donepezil  5 mg Oral Nightly    atorvastatin  40 mg Oral Daily    aspirin  81 mg Oral Nightly     PRN Meds: perflutren lipid microspheres, sodium chloride flush, sodium chloride, ondansetron **OR** ondansetron, polyethylene glycol, acetaminophen **OR** acetaminophen      Intake/Output Summary (Last 24 hours) at 1/4/2022 9240  Last data filed at 1/4/2022 0751  Gross per 24 hour   Intake 250 ml   Output 0 ml   Net 250 ml       Exam:    BP (!) 152/66   Pulse 95   Temp 98.8 °F (37.1 °C) (Oral)   Resp 16   Ht 5' 7\" (1.702 m)   Wt 169 lb 15.6 oz (77.1 kg)   SpO2 96%   BMI 26.62 kg/m²     General appearance: No apparent distress, appears stated age and cooperative. HEENT: Pupils equal, round, and reactive to light. Conjunctivae/corneas clear. Neck: Supple, with full range of motion. No jugular venous distention. Trachea midline. Respiratory:  Normal respiratory effort. Clear to auscultation, bilaterally without Rales/Wheezes/Rhonchi. Cardiovascular: Regular rate and rhythm with normal S1/S2 without murmurs, rubs or gallops. Abdomen: Soft, non-tender, non-distended with normal bowel sounds. Musculoskelatal: No clubbing, cyanosis or edema bilaterally. Skin: Skin color, texture, turgor normal.  No rashes or lesions.   Neurologic:  Cranial nerves: II-XII intact, grossly non-focal.  Psychiatric: Alert and oriented, thought content appropriate, normal insight    Labs:   Recent Labs     01/02/22  0555 01/03/22  0602 01/04/22  0303   WBC 3.8* 3.2* 5.7   HGB 13.9 13.9 13.5   HCT 41.5 42.4 40.4    205 198     Recent Labs     01/03/22  0602 01/03/22  1102 01/04/22  0303    139 142   K 3.8 3.7 3.8    103 108   CO2 28 23 25   BUN 22* 22* 21*   CREATININE 0.8 0.8 0.8   CALCIUM 8.2* 8.8 8.2*     Recent Labs     01/03/22  0602 01/03/22  1102 01/04/22  0303   AST 44* 53* 46*   ALT 28 33 33   BILITOT 0.7 0.9 0.8   ALKPHOS 115 132* 112     No results for input(s): INR in the last 72 hours. Recent Labs     01/03/22  1102   CKTOTAL 410*   TROPONINI <0.01       Studies:  CT HEAD WO CONTRAST   Final Result      1. Stable exam with no acute intracranial abnormality. CT HIP RIGHT WO CONTRAST   Final Result     No acute findings in the right hip. Advanced degenerative    arthropathy of the right hip. CT CERVICAL SPINE WO CONTRAST   Final Result     No acute findings in the cervical spine. CT HEAD WO CONTRAST   Final Result     No acute findings in the head/brain. XR HIP 2-3 VW W PELVIS RIGHT   Final Result      Severe degenerative arthritis of the hip joints bilaterally. No acute findings. XR CHEST PORTABLE   Final Result      No acute cardiopulmonary findings. Assessment/Plan:    Active Hospital Problems    Diagnosis Date Noted    New onset a-fib [facility]) [I48.91] 12/31/2021    Acute metabolic encephalopathy [C01.74] 12/31/2021    PAD (peripheral artery disease) ([facility] [address] [I73.9] 02/26/2019    Essential hypertension [I10] 02/26/2019    Hyperlipidemia [E78.5] 09/21/2005     Syncope - etiology not clear, now stable.    Head ct neg, echo with LVEF 55-60%, no regional wall motion abnormalities  She has had prior pace done in [place], this is her 4th episode of passing out  Discussed with cardiology, no indication for EP consult at this time  Recommended Cardiac monitor on dc  Orthostatic were done and negative    Covid19 infection - Pt has a reinfection case of COVID-19, having already recovered from a COVID-19 infection in October 2021, and now developing symptoms yesterday and testing positive as an outpatient, as both her daughter and son-in-law are also currently positive for COVID-19. COVID19 test was done on 12/30 as outpatient and was positive  - she is not hypoxic and no complains of dyspnea, no need for decadron rx     New onset a-fib (HCC) - Bt has been back and forth between A fin and NSR. Cardiology consulted, she has been in NSR since admission  With hx of syncope and falls not a candidate for ac  On ASA which will be continued  Continue metoprolol 25 mg bid     PAD (peripheral artery disease) ([facility] [address] - continue ASA and Statin     Acute metabolic encephalopathy - said to be worse than her baseline dementia. Possibly due to illness. , back to baseline at the time of dc    Essential (primary) hypertension  Added lopressor and low dose amlodipine     Hyperlipidemia - continue statin    DVT Prophylaxis: Low intensity Enoxaparin   Diet: ADULT DIET; Regular; No Added Salt (3-4 gm)  Code Status: Full Code    PT/OT Eval Status: 17/24 on the AM-PAC short mobility form  Pt requires CGA to SBA for safe mobility today. Requires use of RW for safe amb. Safety concerns if pt returns home alone and unsure if family is able to be with her during the day.       Dispo - plan to dc home today, discussed with patient's daughter over the phone    Nayana Haley MD  Hospitalist  Attending Physician

## 2022-01-04 NOTE — PLAN OF CARE
Problem: Falls - Risk of:  Goal: Will remain free from falls  Description: Will remain free from falls  1/4/2022 1004 by Zoë Oviedo RN  Note: Pt is a High Fall Risk. See Lio Donrita Fall Risk Score. Pt bed in low position, bed wheels locked, non-skid socks in use, bed alarm on, and 3/4 side rails up. Call light and belongings left within reach and pt encouraged to call for assistance. Will continue with hourly rounds for PO intake, pain needs, toileting, and repositioning as needed. No needs expressed at this time. [employer] camera in place to monitor pt for ambulation unassisted. Problem: Gas Exchange - Impaired  Goal: Absence of hypoxia  1/4/2022 1004 by Zoë Oviedo RN  Note: SpO2 level 96% on room air. Lungs are clear/diminished to auscultation. Pt denies dizziness, SOB, or cough at this time. NSR on telemetry. Medications given as scheduled. Droplet plus isolation maintained due to positive Covid-19 result. Will continue to monitor oxygenation/respiratory pattern and will report changes in pt condition to physician. Problem: Pain:  Goal: Pain level will decrease  Description: Pain level will decrease  Note: Pt denies pain at this time. Will continue to monitor for presence of pain.

## 2022-01-04 NOTE — PROGRESS NOTES
Discharge note: Patient has been seen by doctor. Discharge order obtained, and discharge instructions reviewed. Patient's daughter Aneta Jordan educated, using the teach back method, about follow up instructions and discharge instructions. A completed copy of the AVS instructions given to patient's daughter and all questions answered. Tele removed. Cardiac event monitored placed on pt and education provided. Discharged to lobby via wheel chair with personal belongings. Meds-to-beds also given to pt's daughter at time of discharge.  Electronically signed by Santos Beck RN on 1/4/2022 at 5:21 PM

## 2022-01-04 NOTE — DISCHARGE INSTR - COC
Continuity of Care Form    Patient Name: Cruzito Avery   :  1940  MRN:  [id number]    Admit date:  2021  Discharge date:  2022          CASE MANAGEMENT/SOCIAL WORK SECTION    Inpatient Status Date: ***    Readmission Risk Assessment Score:  Readmission Risk              Risk of Unplanned Readmission:  18           Discharging to Facility/ Agency   Name:   Address:  Phone:  Fax:    Dialysis Facility (if applicable)   Name:  Address:  Dialysis Schedule:  Phone:  Fax:    / signature: {Esignature:[id number]}    PHYSICIAN SECTION    Prognosis: Fair    Condition at Discharge: Stable    Rehab Potential (if transferring to Rehab): Fair    Recommended Labs or Other Treatments After Discharge:   - Vital signs monitoring: Nurse to perform BP, HR, RR, Temp, and Weight with each visit and teach patient and caregivers on taking daily. Nurse to call physician if pulse less than 50 or greater than 120, respiratory rate less than 12 or greater than 25, oral temperature greater than or equal to 155 oF, systolic BP less than 90 or greater than 149, diastolic BP less than 50 or greater than 100. - Medication compliance and education for  new medications changes in previous medications safety concerns  - Consult Physical Therapy for  Evaluate and Treat  - Consult Occupational Therapy for  Evaluate and Treat     Physician Certification: I certify the above information and transfer of Cruzito Avery  is necessary for the continuing treatment of the diagnosis listed and that she requires Home Care for less 30 days.      Update Admission H&P: No change in H&P    PHYSICIAN SIGNATURE:  Electronically signed by Aldo Munoz MD on 22 at 1:27 PM EST

## 2022-01-04 NOTE — PLAN OF CARE
Problem: Falls - Risk of:  Goal: Will remain free from falls  Description: Will remain free from falls  1/4/2022 0216 by Genesis Silva RN  Outcome: Ongoing  Note: Pt is a Fall Risk. See Ronak Bottoms Fall Risk Score. Pt bed in low position and side rails up. [employer] monitor in use for safety. Call light and belongings in reach. Pt encouraged to call for assistance. Will continue with hourly rounds for PO intake, pain needs, toileting, and repositioning as needed. Problem: Skin Integrity:  Goal: Absence of new skin breakdown  Description: Absence of new skin breakdown  Outcome: Ongoing  Note: Pt encouraged to reposition self in bed. Merari care provided as needed. Problem: Gas Exchange - Impaired  Goal: Absence of hypoxia  Outcome: Ongoing  Note: Pt on room air. Oxygenation >92%. Problem: Isolation Precautions - Risk of Spread of Infection  Goal: Prevent transmission of infection  Outcome: Ongoing  Note: Pt remains in droplet plus isolation d/t positive covid test.      Problem: Risk for Fluid Volume Deficit  Goal: Maintain normal heart rhythm  Outcome: Ongoing  Note: Continuous telemetry in place. Normal sinus rhythm.

## 2022-01-05 ENCOUNTER — TELEPHONE (OUTPATIENT)
Dept: INTERNAL MEDICINE CLINIC | Age: 82
End: 2022-01-05

## 2022-01-05 ENCOUNTER — CARE COORDINATION (OUTPATIENT)
Dept: CASE MANAGEMENT | Age: 82
End: 2022-01-05

## 2022-01-05 ENCOUNTER — TELEPHONE (OUTPATIENT)
Dept: CARDIOLOGY CLINIC | Age: 82
End: 2022-01-05

## 2022-01-05 NOTE — CARE COORDINATION
Date/Time:  2022 11:28 AM  Attempted to reach patient by telephone. Call within 2 business days of discharge: Yes, Left HIPPA compliant message requesting a return call. Will attempt to reach patient again.     Thank Laura Bailey RN  Care Transition Coordinator  Contact [phone]

## 2022-01-05 NOTE — PROGRESS NOTES
Physician Progress Note      PATIENTUlysses Hamel  CSN #:                  [id number]  :                       1940  ADMIT DATE:       2021 12:18 AM  DISCH DATE:        2022 5:11 PM  RESPONDING  PROVIDER #:        Layne De La Vega MD          QUERY TEXT:    Patient admitted  with fall from home after episode of lightheadness and   found to be in Afib/ RVR. Also diagnosed with COVID, though negative   respiratory symptoms. Noted documentation of \"Covid PNA\" on 1/3 by Cardiology. CXR negative. Please document if the diagnosis of PNA has been ruled out   after further study. The medical record reflects the following:  Risk Factors: COVID +  Clinical Indicators: Per ED \"tested positive today after having developed   symptoms yesterday; has had a low-grade fever at home; No wheezing, rhonchi or   rales\". Per pn  \"covid19 infection without hypoxemia; Feels well, no   complaints currently. No cough. No shortness of breath\"; Per pn  \"she is   not hypoxic and no complains of dyspnea, no need for decadron rx\". CXR: No   acute cardiopulmonary findings. Afebrile with normal WBC. Treatment: COVID PCR, Isolation  Options provided:  -- Pneumonia was ruled out  -- Pneumonia present as evidenced by, Please document evidence. -- Other - I will add my own diagnosis  -- Disagree - Not applicable / Not valid  -- Disagree - Clinically unable to determine / Unknown  -- Refer to Clinical Documentation Reviewer    PROVIDER RESPONSE TEXT:    Pneumonia was ruled out after study. Query created by:  Yves Booth on 2022 3:49 PM      Electronically signed by:  Layne De La Vega MD 2022 7:50 PM

## 2022-01-05 NOTE — TELEPHONE ENCOUNTER
[facility] Trans[facility] Initial Follow Up Call    Outreach made within 2 business days of discharge: Yes    Patient: Claire Irvin Patient : 1940   MRN: [id number]  Reason for Admission: There are no discharge diagnoses documented for the most recent discharge. Discharge Date: 22       Spoke with: Daughter     Discharge department/facility: [facility] Interactive Patient Contact:  Was patient able to fill all prescriptions: Yes  Was patient instructed to bring all medications to the follow-up visit: Yes  Is patient taking all medications as directed in the discharge summary?  Yes  Does patient understand their discharge instructions: Yes  Does patient have questions or concerns that need addressed prior to 7-14 day follow up office visit: no    Scheduled appointment with PCP within 7-14 days    Follow Up  Future Appointments   Date Time Provider Tammie Reid   2022  1:30 PM MONISHA Salinas - CNP [place] [facility] [place], [place]

## 2022-01-05 NOTE — CARE COORDINATION
Patient contacted regarding COVID-19 diagnosis. Discussed COVID-19 related testing which was available at this time. Test results were positive. Patient informed of results, if available? Yes. Care Transition Nurse contacted the family by telephone to perform post discharge assessment. Call within 2 business days of discharge: Yes. Verified name and  with family as identifiers. Provided introduction to self, and explanation of the CTN/ACM role, and reason for call due to risk factors for infection and/or exposure to COVID-19. Symptoms reviewed with family who verbalized the following symptoms: fatigue, fast heart rate, dizziness/lightheadedness and Lethargic at times. Due to no new or worsening symptoms encounter was not routed to provider for escalation. Discussed follow-up appointments. If no appointment was previously scheduled, appointment scheduling offered: Yes. [facility] follow up appointment(s):   Future Appointments   Date Time Provider Tammie Reid   2022  1:30 PM MONISHA Dickinson - CNP [place] [facility]   2022 11:00 AM DO STEPHEN Lentz [address]     Non-face-to-face services provided:  Obtained and reviewed discharge summary and/or continuity of care documents  Communication with home health agencies or other community services the patient is currently [facility]  Education of patient/family/caregiver/guardian to support self-management-. Assessment and support for treatment adherence and medication management-. Advance Care Planning:   Does patient have an Advance Directive:  not on file. Educated patient about risk for severe COVID-19 due to risk factors according to CDC guidelines. CTN reviewed discharge instructions, medical action plan and red flag symptoms with the family who verbalized understanding. Discussed COVID vaccination status: Yes. Education provided on COVID-19 vaccination as appropriate.  Discussed exposure protocols and quarantine with CDC Guidelines. Family was given an opportunity to verbalize any questions and concerns and agrees to contact CTN or health care provider for questions related to their healthcare. Reviewed and educated family on any new and changed medications related to discharge diagnosis     Was patient discharged with a pulse oximeter? No Discussed and confirmed pulse oximeter discharge instructions and when to notify provider or seek emergency care. CTN spoke with patient's daughter this afternoon for initial 24 hour discharge follow up 1 [facility] Monitoring call. Daughter states patient is not doing well, still really fatigued and tired. No reports of any fever, chills, nausea, vomiting, SOB or cough. Patient is slightly lethargic at times, but is not hard to arouse, states patient did complain of chest pain last night, heart monitor came off in bed last night. Patient with no chest pain today, patient did have some dizziness and lightheadedness this am, while eating breakfast.  Daughter states patient has a virtual visit with Cardiologist today. Instructed daughter to make sure to let Cardiologist of all symptoms, patient is having. Also instructed to make sure patient is resting as needed, and eating and drinking plenty, staying within any fluid restrictions. CTN provided contact information. Plan for follow-up call in 5-7 days based on severity of symptoms and risk factors.     Thank Jessica Dang RN  Care Transition Coordinator  [facility][phone]

## 2022-01-05 NOTE — TELEPHONE ENCOUNTER
----- Message from Hayden Lim DO sent at 1/5/2022 10:27 AM EST -----  Regarding: Hospital discharge  Patient was discharged from the hospital yesterday. Please complete TCM call.

## 2022-01-10 ENCOUNTER — CARE COORDINATION (OUTPATIENT)
Dept: CASE MANAGEMENT | Age: 82
End: 2022-01-10

## 2022-01-10 NOTE — CARE COORDINATION
Patient contacted regarding COVID-19 diagnosis. Discussed COVID-19 related testing which was available at this time. Test results were positive. Patient informed of results, if available? Yes    Care Transition Nurse contacted the family by telephone to perform follow-up assessment. Verified name and  with family as identifiers. Patient has following risk factors of: diabetes. Symptoms reviewed with family who verbalized the following symptoms: no new symptoms and no worsening symptoms. Due to no new or worsening symptoms encounter was not routed to provider for escalation. Educated patient about risk for severe COVID-19 due to risk factors according to CDC guidelines. CTN reviewed discharge instructions, medical action plan and red flag symptoms with the family who verbalized understanding. Discussed COVID vaccination status: Yes. Education provided on COVID-19 vaccination as appropriate. Discussed exposure protocols and quarantine with CDC Guidelines. Family was given an opportunity to verbalize any questions and concerns and agrees to contact CTN or health care provider for questions related to their healthcare. Was patient discharged with a pulse oximeter? No Discussed and confirmed pulse oximeter discharge instructions and when to notify provider or seek emergency care. CTN spoke with patients daughter this afternoon for follow up COVID -19 Monitoring call. Daughter states patient is doing better, no reports of any fever, chills, nausea, vomiting, chest pain, SOB or cough. Patient with no congestion, pain, difficulty emptying bladder, LE edema, feeling lightheaded, dizziness, and heart palpitations. Daughter states patients HR and BP have been running low, states she has been in contact with Cardiologist as well, talking regarding medications.   CTN encouraged daughter to continue to monitor for any of the above s/s, reporting any that may present to MD immediately, make sure patient is resting as needed and continuing to monitor HR and BP's. Daughter states SN with [facility] agency has been in home PT/OT to be in home this week as well. No other issues or concerns at this time. CTN provided contact information. Plan for follow-up call in 5-7 days based on severity of symptoms and risk factors.     Thank Emani Martin RN  Care Transition Coordinator  Contact [facility]:[phone]

## 2022-01-11 ENCOUNTER — VIRTUAL VISIT (OUTPATIENT)
Dept: CARDIOLOGY CLINIC | Age: 82
End: 2022-01-11
Payer: MEDICARE

## 2022-01-11 DIAGNOSIS — I48.0 PAF (PAROXYSMAL ATRIAL FIBRILLATION) (HCC): Primary | ICD-10-CM

## 2022-01-11 DIAGNOSIS — I10 ESSENTIAL HYPERTENSION: ICD-10-CM

## 2022-01-11 DIAGNOSIS — E78.2 MIXED HYPERLIPIDEMIA: ICD-10-CM

## 2022-01-11 DIAGNOSIS — I73.9 PAD (PERIPHERAL ARTERY DISEASE) (HCC): ICD-10-CM

## 2022-01-11 PROCEDURE — G8427 DOCREV CUR MEDS BY ELIG CLIN: HCPCS | Performed by: NURSE PRACTITIONER

## 2022-01-11 PROCEDURE — G8417 CALC BMI ABV UP PARAM F/U: HCPCS | Performed by: NURSE PRACTITIONER

## 2022-01-11 PROCEDURE — G8484 FLU IMMUNIZE NO ADMIN: HCPCS | Performed by: NURSE PRACTITIONER

## 2022-01-11 PROCEDURE — G8400 PT W/DXA NO RESULTS DOC: HCPCS | Performed by: NURSE PRACTITIONER

## 2022-01-11 PROCEDURE — 1090F PRES/ABSN URINE INCON ASSESS: CPT | Performed by: NURSE PRACTITIONER

## 2022-01-11 PROCEDURE — 1111F DSCHRG MED/CURRENT MED MERGE: CPT | Performed by: NURSE PRACTITIONER

## 2022-01-11 PROCEDURE — 1123F ACP DISCUSS/DSCN MKR DOCD: CPT | Performed by: NURSE PRACTITIONER

## 2022-01-11 PROCEDURE — 99442 PR PHYS/QHP TELEPHONE EVALUATION 11-20 MIN: CPT | Performed by: NURSE PRACTITIONER

## 2022-01-11 PROCEDURE — 4040F PNEUMOC VAC/ADMIN/RCVD: CPT | Performed by: NURSE PRACTITIONER

## 2022-01-11 PROCEDURE — 1036F TOBACCO NON-USER: CPT | Performed by: NURSE PRACTITIONER

## 2022-01-11 NOTE — PROGRESS NOTES
Hospital follow up: Telephone visit. 12 minutes    HPI:  [de-identified] y.o. patient of Dr Marielena Ardon with dementia, HTN, HLD, and PAD who was recently hospitalized and found to have prox AF. She was discharged on a monitor but was only able to wear it 24 hours. BPs are soft/low at times. BP was 81/49 hr 79 after norvasc and metoprolol. Both drugs were stopped. Then /73, HR 72 and 124/80 hr 112. When HR elevates she was given norvasc. She does feel palpitations at times. Denies cp, sob, LH/dizziness, or syncope. No LE edema, orthopnea or PND, no fever, chills, n/v/d or GI bleeding. She has chronic  bleeding and seen by urology. Past Medical History:   Diagnosis Date    Anxiety     Chronic renal insufficiency     COVID-19 virus infection 12/30/2021    Dementia ([facility]     Hyperlipidemia     Hypertension     Peripheral arterial disease ([facility]     Prediabetes      History reviewed. No pertinent surgical history. History reviewed. No pertinent family history. Social History     Tobacco Use    Smoking status: Never Smoker    Smokeless tobacco: Never Used   Vaping Use    Vaping Use: Never used   Substance Use Topics    Alcohol use: Never    Drug use: Never     Allergies:Clarithromycin and Sulfamethoxazole-trimethoprim    Review of Systems  General: No changes in weight, fatigue, or night sweats. HEENT: No blurry or decreased vision. No changes in hearing, nasal discharge or sore throat. Cardiovascular:  See HPI. Respiratory: No cough, hemoptysis, or wheezing. Gastrointestinal:  No abdominal pain, hematochezia, melana, constipation, diarrhea, or history of GI ulcers. Genito-Urinary: No dysuria. No urgency or polyuria. Musculoskeletal:  No complaints of joint pain, joint swelling or muscular weakness/soreness. Neurological:  No dizziness, headaches, numbness/tingling, speech problems or weakness. Psychological:  No anxiety or depression.   Hematological and Lymphatic: No abnormal bleeding or bruising, blood clots, jaundice or swollen lymph nodes. Endocrine:   No malaise/lethargy, palpitations, polydipsia/polyuria, temperature intolerance or unexpected weight changes  Skin:  No rashes or non-healing ulcers. Physical Exam:  There were no vitals taken for this visit. 124/80   Weight 170    Weight  Wt Readings from Last 3 Encounters:   21 169 lb 15.6 oz (77.1 kg)   12/15/21 176 lb (79.8 kg)   21 184 lb (83.5 kg)          CBC:   Lab Results   Component Value Date    WBC 5.7 2022    HGB 13.5 2022    HCT 40.4 2022    MCV 87.4 2022     2022     BMP:  Lab Results   Component Value Date    CREATININE 0.8 2022    BUN 21 (H) 2022     2022    K 3.8 2022     2022    CO2 25 2022     Mag:   Lab Results   Component Value Date    MG 1.90 2022     LIVER PROFILE:   Lab Results   Component Value Date    ALT 33 2022    AST 46 (H) 2022    ALKPHOS 112 2022    BILITOT 0.8 2022     PT/INR:   Lab Results   Component Value Date    INR 1.08 2021    PROTIME 12.3 2021     Pro-BNP   Lab Results   Component Value Date    PROBNP 265 2021    PROBNP 209 10/22/2021     LIPIDS:  No components found for: CHLPL  Lab Results   Component Value Date    TRIG 78 2021     Lab Results   Component Value Date    HDL 30 (L) 2021     Lab Results   Component Value Date    LDLCALC 72 2021     Lab Results   Component Value Date    LABVLDL 16 2021     TSH:  Lab Results   Component Value Date    TSH 3.64 2021       IMAGIN/3/2022 Echo:     Technically difficult examination. Left ventricular cavity size is normal. Normal wall thickness. Overall left   ventricular systolic function appears normal with an estimated ejection   fraction 55-60%. No definite regional wall motion abnormalities are noted.    No obvious structural valvular abnormalities (aortic and pulmonary valve leaflets were not well visualized); Doppler was not performed. Medications:   Current Outpatient Medications   Medication Sig Dispense Refill    metoprolol tartrate (LOPRESSOR) 25 MG tablet Take 0.5 tablets by mouth 2 times daily 60 tablet 3    ketoconazole (NIZORAL) 2 % cream Apply topically daily. 60 g 3    mirtazapine (REMERON) 15 MG tablet TAKE 1 TABLET BY MOUTH EVERY DAY AT NIGHT 90 tablet 1    donepezil (ARICEPT) 5 MG tablet TAKE 1 TABLET BY MOUTH EVERY DAY AT NIGHT 90 tablet 1    atorvastatin (LIPITOR) 40 MG tablet Take 1 tablet by mouth daily 30 tablet 3    aspirin 81 MG EC tablet Take 81 mg by mouth nightly        No current facility-administered medications for this visit. Assessment:  1. PAF (paroxysmal atrial fibrillation) ([facility]    2. Essential hypertension    3. Mixed hyperlipidemia    4. PAD (peripheral artery disease) ([facility])        Plan:  paf   Start metoprolol 12.5 mg po bid. Hold for SBP< 100 mmHg or HR <60 bpm   2 week monitor   ASA  HTN   Hypotension at times   Amlodipine stopped   Cont metoprolol  HLD   Liptor  PAD   ASA, lipitor    Follow up with Dr Ania Stern in 5-6 weeks     Reviewed most recent: CBC, BMP, LFT, Lipids, Mag, PT/INR, BNP, TSH  Reviewed most recent: ECG, Echo,     Dev Noble is a [de-identified] y.o. female being evaluated by a Virtual Visit encounter to address concerns as mentioned above. A caregiver was present when appropriate. Due to this being a TeleHealth encounter (During [facility]- public health emergency), no physical exam was preformed. Patient was located at their individual home. Pursuant to the emergency declaration under the [facility], [address] [place] [address] authority and the [employer] and [employer] Act, this Virtual Visit was conducted with patient's (and/or legal guardian's) consent, to reduce the patient's risk of exposure to COVID-19 and provide necessary medical care.   The patient (and/or legal guardian) has also been advised to contact this office for worsening conditions or problems, and seek emergency medical treatment and/or call 911 if deemed necessary. --MONISHA Ochoa CNP on 1/11/2022 at 4:18 PM    An electronic signature was used to authenticate this note.

## 2022-01-12 ENCOUNTER — OFFICE VISIT (OUTPATIENT)
Dept: INTERNAL MEDICINE CLINIC | Age: 82
End: 2022-01-12
Payer: MEDICARE

## 2022-01-12 VITALS
BODY MASS INDEX: 26.37 KG/M2 | WEIGHT: 168 LBS | SYSTOLIC BLOOD PRESSURE: 112 MMHG | HEART RATE: 74 BPM | DIASTOLIC BLOOD PRESSURE: 64 MMHG | HEIGHT: 67 IN | OXYGEN SATURATION: 98 % | RESPIRATION RATE: 16 BRPM

## 2022-01-12 DIAGNOSIS — I48.0 PAROXYSMAL A-FIB (HCC): Primary | ICD-10-CM

## 2022-01-12 DIAGNOSIS — U07.1 COVID-19: ICD-10-CM

## 2022-01-12 PROCEDURE — 1111F DSCHRG MED/CURRENT MED MERGE: CPT | Performed by: INTERNAL MEDICINE

## 2022-01-12 PROCEDURE — 99495 TRANSJ CARE MGMT MOD F2F 14D: CPT | Performed by: INTERNAL MEDICINE

## 2022-01-12 NOTE — PROGRESS NOTES
NIGHT              Medications marked \"taking\" at this time  Outpatient Medications Marked as Taking for the 1/12/22 encounter (Office Visit) with Laura Torres, DO   Medication Sig Dispense Refill    metoprolol tartrate (LOPRESSOR) 25 MG tablet Take 0.5 tablets by mouth 2 times daily 60 tablet 3    ketoconazole (NIZORAL) 2 % cream Apply topically daily. 60 g 3    mirtazapine (REMERON) 15 MG tablet TAKE 1 TABLET BY MOUTH EVERY DAY AT NIGHT 90 tablet 1    donepezil (ARICEPT) 5 MG tablet TAKE 1 TABLET BY MOUTH EVERY DAY AT NIGHT 90 tablet 1    atorvastatin (LIPITOR) 40 MG tablet Take 1 tablet by mouth daily 30 tablet 3    aspirin 81 MG EC tablet Take 81 mg by mouth nightly           Medications patient taking as of now reconciled against medications ordered at time of hospital discharge: Yes    Chief Complaint   Patient presents with    Follow-Up from Hospital       History of Present illness - Follow up of Hospital diagnosis(es): paroxysmal afib. covid 19 infection. Inpatient course: Discharge summary reviewed- see chart. Interval history/Current status: patient has been doing well since being home. bp did start to get low (60D systolic). Patients daughter called cardiology office. Patients amlodipine was discontinued and metoprolol was decreased to 12.5mg bid from 25mg BID. Patients bp has since been well controlled. Patient feeling well without any complaints or concerns. They do have plans for 2 week holter and follow up with cardiology that is up coming. Patient has had a few what sound like vasovagal events including one in the hopsital. Hopefully holter will help as well as cardiology eval.     A comprehensive review of systems was negative except for what was noted in the HPI. Vitals:    01/12/22 1050   BP: 112/64   Pulse: 74   Resp: 16   SpO2: 98%   Weight: 168 lb (76.2 kg)   Height: 5' 7\" (1.702 m)     Body mass index is 26.31 kg/m².    Wt Readings from Last 3 Encounters:   01/12/22 168 lb (76.2 kg)   12/31/21 169 lb 15.6 oz (77.1 kg)   12/15/21 176 lb (79.8 kg)     BP Readings from Last 3 Encounters:   01/12/22 112/64   01/04/22 (!) 152/64   12/15/21 130/70        Physical Exam:  General Appearance: well-developed and well nourished, in no acute distress and alert  Head: normocephalic and atraumatic  Eyes: pupils equal, round, and reactive to light, extraocular eye movements intact, conjunctivae normal  Pulmonary/Chest: clear to auscultation bilaterally- no wheezes, rales or rhonchi, normal air movement, no respiratory distress  Cardiovascular: normal rate, normal S1 and S2 and no gallops  Abdomen: soft, non-tender, non-distended, normal bowel sounds, no masses or organomegaly  Extremities: no cyanosis and no edema    Assessment/Plan:  1. Paroxysmal A-fib (HCC)  Currently HR regular on my exam. Continue metoprolol 12.5mg BID. Follow up with cardiology is upcoming. Plans for holter as well. - GA DISCHARGE MEDS RECONCILED W/ CURRENT OUTPATIENT MED LIST    2. COVID-19  Patient has recovered well from this. Still having some fatigue. Cough is gone. No shortness of breath.    - GA DISCHARGE MEDS RECONCILED W/ CURRENT OUTPATIENT MED LIST        Medical Decision Making: moderate complexity

## 2022-01-14 ENCOUNTER — NURSE ONLY (OUTPATIENT)
Dept: CARDIOLOGY CLINIC | Age: 82
End: 2022-01-14
Payer: MEDICARE

## 2022-01-14 DIAGNOSIS — I48.91 NEW ONSET A-FIB (HCC): ICD-10-CM

## 2022-01-14 DIAGNOSIS — I10 ESSENTIAL HYPERTENSION: Primary | ICD-10-CM

## 2022-01-14 PROCEDURE — 93000 ELECTROCARDIOGRAM COMPLETE: CPT | Performed by: NURSE PRACTITIONER

## 2022-01-14 PROCEDURE — 93242 EXT ECG>48HR<7D RECORDING: CPT | Performed by: INTERNAL MEDICINE

## 2022-01-18 ENCOUNTER — CARE COORDINATION (OUTPATIENT)
Dept: CASE MANAGEMENT | Age: 82
End: 2022-01-18

## 2022-01-18 NOTE — CARE COORDINATION
Follow Up Call    Challenges to be reviewed by the provider   Additional needs identified to be addressed with provider: No  none                 Encounter was not routed to provider for escalation. Method of communication with provider:  none. Contacted the family by telephone to follow up after hospital visit. Status: improved  Interventions to address identified needs: Education of patient/family/caregiver/guardian to support self-management-. Assessment and support for treatment adherence and medication management-. [facility] follow up appointment(s):   Future Appointments   Date Time Provider Tammie Reid   2/28/2022  2:45 PM MD Zeyad [place] [facility]   4/13/2022 11:00 AM DO STEPHEN Lentz [facility] - D[facility]D     Non-[facility] follow up appointment(s):   Follow up appointment completed? Yes. Provided contact information for future needs. Plan for follow-up call in 5-7 days based on severity of symptoms and risk factors. Plan for next call: Any symptoms that may present.     Ishmael Patel RN

## 2022-01-20 NOTE — DISCHARGE SUMMARY
Hospital Medicine Discharge Summary    Patient ID: Aristides Abel      Patient's PCP: Gilberto Smith DO    Admit Date: 12/31/2021     Discharge Date: 1/4/2022    Admitting Physician: Rob Larios MD     Discharge Physician: Cam Espinoza MD     Discharge Diagnoses: Active Hospital Problems    Diagnosis Date Noted    New onset a-fib [facility]) [I48.91] 12/31/2021    Acute metabolic encephalopathy [A26.21] 12/31/2021    PAD (peripheral artery disease) ([address] [I73.9] 02/26/2019    Essential hypertension [I10] 02/26/2019    Hyperlipidemia [E78.5] 09/21/2005       The patient was seen and examined on day of discharge and this discharge summary is in conjunction with any daily progress note from day of discharge. Hospital Course:   Covid19 infection - Pt has a reinfection case of COVID-19, having already recovered from a COVID-19 infection in October 2021, and now developing symptoms yesterday and testing positive as an outpatient, as both her daughter and son-in-law are also currently positive for COVID-19.  -No hypoxia  -monitor for development of worsening symptoms or oxygen requirement  -PCR positive, cont to monitor.      New onset a-fib (HCC) - Bt has been back and forth between A fin and NSR. Cardiology consulted  Now in NSR  If remains in NSR, no a/c  Start Lopressor, BP and HR high while in NSR     PAD (peripheral artery disease) (HCC) - continue ASA and Statin     Acute metabolic encephalopathy - said to be worse than her baseline dementia. Possibly due to illness. We will monitor and provide supportive care     Essential (primary) hypertension  Not on home medications  Lopressor added     Hyperlipidemia  Statin      Physical Exam Performed:     BP (!) 152/64   Pulse 93   Temp 97.3 °F (36.3 °C) (Oral)   Resp 20   Ht 5' 7\" (1.702 m)   Wt 169 lb 15.6 oz (77.1 kg)   SpO2 94%   BMI 26.62 kg/m²     General appearance: No apparent distress, appears stated age and cooperative.   HEENT: Pupils equal, round, and reactive to light. Conjunctivae/corneas clear. Neck: Supple, with full range of motion. No jugular venous distention. Trachea midline. Respiratory:  Normal respiratory effort. Clear to auscultation, bilaterally without Rales/Wheezes/Rhonchi. Cardiovascular: Regular rate and rhythm with normal S1/S2 without murmurs, rubs or gallops. Abdomen: Soft, non-tender, non-distended with normal bowel sounds. Musculoskelatal: No clubbing, cyanosis or edema bilaterally. Skin: Skin color, texture, turgor normal.  No rashes or lesions. Neurologic:  Cranial nerves: II-XII intact, grossly non-focal.  Psychiatric: Alert and oriented, thought content appropriate, normal insight      Labs: For convenience and continuity at follow-up the following most recent labs are provided:      CBC:    Lab Results   Component Value Date    WBC 5.7 01/04/2022    HGB 13.5 01/04/2022    HCT 40.4 01/04/2022     01/04/2022       Renal:    Lab Results   Component Value Date     01/04/2022    K 3.8 01/04/2022     01/04/2022    CO2 25 01/04/2022    BUN 21 01/04/2022    CREATININE 0.8 01/04/2022    CALCIUM 8.2 01/04/2022         Significant Diagnostic Studies    Radiology:   CT HEAD WO CONTRAST   Final Result      1. Stable exam with no acute intracranial abnormality. CT HIP RIGHT WO CONTRAST   Final Result     No acute findings in the right hip. Advanced degenerative    arthropathy of the right hip. CT CERVICAL SPINE WO CONTRAST   Final Result     No acute findings in the cervical spine. CT HEAD WO CONTRAST   Final Result     No acute findings in the head/brain. XR HIP 2-3 VW W PELVIS RIGHT   Final Result      Severe degenerative arthritis of the hip joints bilaterally. No acute findings. XR CHEST PORTABLE   Final Result      No acute cardiopulmonary findings.                 Consults:     IP CONSULT TO HOSPITALIST  IP CONSULT TO CARDIOLOGY  IP CONSULT TO HOME CARE NEEDS    Disposition:  Home     Condition at Discharge: Stable    Discharge Instructions/Follow-up:    Follow up with PCP and cardiology    Code Status:  Full Code    Activity: activity as tolerated    Diet: regular diet      Discharge Medications:     Discharge Medication List as of 1/4/2022  4:36 PM           Details   amLODIPine (NORVASC) 5 MG tablet Take 0.5 tablets by mouth daily, Disp-30 tablet, R-3Normal      metoprolol tartrate (LOPRESSOR) 25 MG tablet Take 1 tablet by mouth 2 times daily, Disp-60 tablet, R-3Normal              Details   ketoconazole (NIZORAL) 2 % cream Apply topically daily. , Disp-60 g, R-3, Normal      mirtazapine (REMERON) 15 MG tablet TAKE 1 TABLET BY MOUTH EVERY DAY AT NIGHT, Disp-90 tablet, R-1Normal      donepezil (ARICEPT) 5 MG tablet TAKE 1 TABLET BY MOUTH EVERY DAY AT NIGHT, Disp-90 tablet, R-1Normal      atorvastatin (LIPITOR) 40 MG tablet Take 1 tablet by mouth daily, Disp-30 tablet, R-3Normal      aspirin 81 MG EC tablet Take 81 mg by mouth nightly Historical Med             Time Spent on discharge is more than 30 minutes in the examination, evaluation, counseling and review of medications and discharge plan. Signed:    Mary Monroy MD   1/20/2022      Thank you Alisson Yanez DO for the opportunity to be involved in this patient's care. If you have any questions or concerns please feel free to contact me at [phone].

## 2022-01-21 ENCOUNTER — TELEPHONE (OUTPATIENT)
Dept: INTERNAL MEDICINE CLINIC | Age: 82
End: 2022-01-21
Payer: MEDICARE

## 2022-01-21 DIAGNOSIS — R30.0 DYSURIA: Primary | ICD-10-CM

## 2022-01-21 NOTE — TELEPHONE ENCOUNTER
Joce Herman with [employer] called stating that she spoke with the Patients daughter, she stated that the patient has been showing symptoms of a UTI. stating that she has been having dizziness increase in urination frequency along with some fatigue. Joce Herman would like to know if they could get a order for urinalysis if possible.       Please fax to [phone]

## 2022-01-25 LAB
BILIRUBIN, URINE: NEGATIVE
BLOOD, URINE: POSITIVE
CLARITY: ABNORMAL
COLOR: ABNORMAL
GLUCOSE URINE: NEGATIVE
KETONES, URINE: NEGATIVE
LEUKOCYTE ESTERASE, URINE: NEGATIVE
NITRITE, URINE: NEGATIVE
PH UA: 5.5 (ref 4.5–8)
PROTEIN UA: ABNORMAL
SPECIFIC GRAVITY, URINE: 1.03
UROBILINOGEN, URINE: NORMAL

## 2022-01-27 ENCOUNTER — CARE COORDINATION (OUTPATIENT)
Dept: CASE MANAGEMENT | Age: 82
End: 2022-01-27

## 2022-01-27 NOTE — CARE COORDINATION
Date/Time:  1/27/2022 12:19 PM  Attempted to reach patient by telephone. Call within 2 business days of discharge: Yes, CTN unable to LVM, no mailbox set up. Will attempt to reach patient again.     Thank Yoli Sheets, RN  Care Transition Coordinator  Contact [facility]:[phone]

## 2022-01-28 DIAGNOSIS — B96.5 PSEUDOMONAS URINARY TRACT INFECTION: Primary | ICD-10-CM

## 2022-01-28 DIAGNOSIS — N39.0 PSEUDOMONAS URINARY TRACT INFECTION: Primary | ICD-10-CM

## 2022-01-28 RX ORDER — CIPROFLOXACIN 250 MG/1
250 TABLET, FILM COATED ORAL 2 TIMES DAILY
Qty: 14 TABLET | Refills: 0 | Status: SHIPPED | OUTPATIENT
Start: 2022-01-28 | End: 2022-02-04

## 2022-02-04 ENCOUNTER — CARE COORDINATION (OUTPATIENT)
Dept: CASE MANAGEMENT | Age: 82
End: 2022-02-04

## 2022-02-04 NOTE — CARE COORDINATION
You Patient resolved from the Care Transitions episode on 02/04/2022  Discussed COVID-19 related testing which was available at this time. Test results were positive. Patient informed of results, if available? Yes, Patient has not had another test[facility]    Patient/family has been provided the following resources and education related to COVID-19:                         Signs, symptoms and red flags related to COVID-19            CDC exposure and quarantine guidelines            [employer] exposure contact - [phone]            Contact for their local Department of Health                 Patient currently reports that the following symptoms have improved:  no new/worsening symptoms. Daughter states patient is doing well, reporting no complaints of any congestion, pain, difficulty emptying bladder, LE edema, feeling lightheaded, dizziness, and heart palpitations. CTN encouraged daughter Nakita Boston to continue to monitor for any of the above s/s, reporting any that may present to MD immediately. No further outreach scheduled with this CTN/ACM. Episode of Care resolved. Patient has this CTN/ACM contact information if future needs arise.     Thank Glenis Doyle RN  Care Transition Coordinator  Contact [facility]:[phone]

## 2022-02-15 ENCOUNTER — PATIENT MESSAGE (OUTPATIENT)
Dept: INTERNAL MEDICINE CLINIC | Age: 82
End: 2022-02-15

## 2022-02-15 NOTE — TELEPHONE ENCOUNTER
From: Tiffany Maxwell  To: Dr. Alvarez Bullion: 2/15/2022 12:13 PM EST  Subject: need a refill on Atorvastatin 40mg    My mom Kosta Jensen needs a refill on her Atorvastatin 40mg tablets - Please send to [facility] in [place] - thanks [employer] [phone]

## 2022-02-16 RX ORDER — ATORVASTATIN CALCIUM 40 MG/1
40 TABLET, FILM COATED ORAL DAILY
Qty: 90 TABLET | Refills: 1 | Status: SHIPPED | OUTPATIENT
Start: 2022-02-16 | End: 2022-08-11

## 2022-02-16 RX ORDER — ATORVASTATIN CALCIUM 40 MG/1
40 TABLET, FILM COATED ORAL DAILY
Qty: 30 TABLET | Refills: 3 | Status: SHIPPED | OUTPATIENT
Start: 2022-02-16 | End: 2022-02-16

## 2022-02-16 NOTE — TELEPHONE ENCOUNTER
Last appointment: 1/12/2022  Next appointment: 4/13/2022  Last refill: 2/16/22-pt requesting 90 day supply

## 2022-02-22 NOTE — PROGRESS NOTES
daily. 12/15/21  Yes Coreen Gutiérrez DO   mirtazapine (REMERON) 15 MG tablet TAKE 1 TABLET BY MOUTH EVERY DAY AT NIGHT 10/11/21  Yes Coreen Gutiérrez DO   donepezil (ARICEPT) 5 MG tablet TAKE 1 TABLET BY MOUTH EVERY DAY AT NIGHT 10/11/21  Yes Coreen Gutiérrez DO   aspirin 81 MG EC tablet Take 81 mg by mouth nightly    Yes Historical Provider, MD        Review of Systems   Constitutional: Negative for activity change, appetite change, diaphoresis, fatigue, fever and unexpected weight change. HENT: Negative for congestion, facial swelling, mouth sores and nosebleeds. Eyes: Negative for discharge and visual disturbance. Respiratory: Negative for cough, chest tightness, shortness of breath and wheezing. Cardiovascular: Negative for chest pain, palpitations and leg swelling. Gastrointestinal: Negative for abdominal distention, abdominal pain, blood in stool and vomiting. Endocrine: Negative for cold intolerance, heat intolerance and polyuria. Genitourinary: Negative for difficulty urinating, dysuria, frequency and hematuria. Musculoskeletal: Negative for back pain, joint swelling, myalgias and neck pain. Skin: Negative for color change, pallor and rash. Allergic/Immunologic: Negative for immunocompromised state. Neurological: Negative for dizziness, syncope, weakness, light-headedness, numbness and headaches. Hematological: Negative for adenopathy. Does not bruise/bleed easily. Psychiatric/Behavioral: Negative for behavioral problems, confusion, decreased concentration and suicidal ideas. The patient is not nervous/anxious.         Vitals:    03/01/22 1341   BP: 138/60   Pulse: 84   SpO2: 96%    Weight: 167 lb (75.8 kg)       Vitals:    03/01/22 1341   BP: 138/60   Site: Left Upper Arm   Pulse: 84   SpO2: 96%   Weight: 167 lb (75.8 kg)   Height: 5' 7\" (1.702 m)       BP Readings from Last 3 Encounters:   03/01/22 138/60   01/12/22 112/64   01/04/22 (!) 152/64       Wt Readings from Last 3 Encounters:   03/01/22 167 lb (75.8 kg)   01/12/22 168 lb (76.2 kg)   12/31/21 169 lb 15.6 oz (77.1 kg)       Physical Exam  Constitutional:       General: She is not in acute distress. Appearance: She is well-developed. She is not diaphoretic. HENT:      Head: Normocephalic and atraumatic. Eyes:      Pupils: Pupils are equal, round, and reactive to light. Neck:      Thyroid: No thyromegaly. Vascular: No JVD. Cardiovascular:      Rate and Rhythm: Normal rate and regular rhythm. Chest Wall: PMI is not displaced. Heart sounds: Normal heart sounds, S1 normal and S2 normal. No murmur heard. No friction rub. No gallop. Pulmonary:      Effort: Pulmonary effort is normal. No respiratory distress. Breath sounds: Normal breath sounds. No stridor. No wheezing or rales. Chest:      Chest wall: No tenderness. Abdominal:      General: Bowel sounds are normal. There is no distension. Palpations: Abdomen is soft. Tenderness: There is no abdominal tenderness. There is no guarding or rebound. Musculoskeletal:         General: No tenderness. Normal range of motion. Cervical back: Normal range of motion. Lymphadenopathy:      Cervical: No cervical adenopathy. Skin:     General: Skin is warm and dry. Findings: No erythema or rash. Neurological:      Mental Status: She is alert and oriented to person, place, and time. Coordination: Coordination normal.   Psychiatric:         Behavior: Behavior normal.         Thought Content:  Thought content normal.         Judgment: Judgment normal.         Labs:       Lab Results   Component Value Date    WBC 5.7 01/04/2022    HGB 13.5 01/04/2022    HCT 40.4 01/04/2022    MCV 87.4 01/04/2022     01/04/2022     Lab Results   Component Value Date     01/04/2022    K 3.8 01/04/2022     01/04/2022    CO2 25 01/04/2022    BUN 21 (H) 01/04/2022    CREATININE 0.8 01/04/2022    GLUCOSE 86 01/04/2022    CALCIUM 8.2 (L) 01/04/2022    PROT 6.2 (L) 01/04/2022    LABALBU 3.4 01/04/2022    BILITOT 0.8 01/04/2022    ALKPHOS 112 01/04/2022    AST 46 (H) 01/04/2022    ALT 33 01/04/2022    LABGLOM >60 01/04/2022    GFRAA >60 01/04/2022    AGRATIO 1.2 01/04/2022    GLOB 3.3 10/22/2021         Lab Results   Component Value Date    CHOL 118 06/09/2021     Lab Results   Component Value Date    TRIG 78 06/09/2021     Lab Results   Component Value Date    HDL 30 (L) 06/09/2021     Lab Results   Component Value Date    LDLCALC 72 06/09/2021     Lab Results   Component Value Date    LABVLDL 16 06/09/2021     No results found for: [facility]    Lab Results   Component Value Date    INR 1.08 12/31/2021    PROTIME 12.3 12/31/2021       The ASCVD Risk score (Marybeth Hamilton, et al., 2013) failed to calculate for the following reasons: The 2013 ASCVD risk score is only valid for ages 36 to 78      Imaging:       Last ECG (if available, Personally interpreted):  Normal sinus rhythm, no ischemic changes  Last Monitor/Holter: 1/14/22-1/19/22  NSR, average HR 75 (), AT, no afib or aflutter     Last Stress (if available):    Last Cath (if available):    Last TTE/WOLFGANG(if available): 1/3/22   Technically difficult examination.   Left ventricular cavity size is normal. Normal wall thickness. Overall left   ventricular systolic function appears normal with an estimated ejection   fraction 55-60%. No definite regional wall motion abnormalities are noted.   No obvious structural valvular abnormalities (aortic and pulmonary valve   leaflets were not well visualized); Doppler was not performed. Last CMR  (if available):      Assessment / Plan:     New onset a-fib [facility])   Isolated episode while in the hospital when she had COVID pneumonia. No recurrence. Since then feeling okay. Not on anticoagulation. Cardiac monitor benign, no evidence of A. fib or flutter. Continue metoprolol. Hyperlipidemia  Continue Lipitor, no side effects.       Follow up in 6 months. I had the opportunity to review the clinical symptoms and presentation of Horace Langford. Patient's allergies and medications were reviewed and updated. Patient's past medical, surgical, social and family history were reviewed and updated. Patient's testing including laboratory, ECGs, monitor, imaging (TTE,WOLFGANG,CMR,cath) were reviewed. Tobacco use was discussed with the patient and educated on the negative effects. I have asked the patient to not utilize these agents. All questions and concerns were addressed to the patient/family. Alternatives to my treatment were discussed. The note was completed using EMR. Every effort wasmade to ensure accuracy; however, inadvertent computerized transcription errors may be present. Thank you for allowing me to participate in thecare or Ashanti Rothman MD, [facility] [place]

## 2022-02-23 ENCOUNTER — TELEPHONE (OUTPATIENT)
Dept: CT IMAGING | Age: 82
End: 2022-02-23

## 2022-02-23 DIAGNOSIS — R91.8 ABNORMAL FINDING ON LUNG IMAGING: Primary | ICD-10-CM

## 2022-02-23 NOTE — TELEPHONE ENCOUNTER
Dr. Zena Viera,    I wanted to bring your attention to CTA neck on 10/22/21 from [facility] ED. Patient with incidental findings. 3month follow-up lung nodule due now if you believe it is necessary for this pt: Impression       1. Patent carotid and vertebral arteries. 2. Mild atherosclerotic stenosis of the proximal left internal carotid artery measuring 30% in severity. 3. Mild soft atherosclerotic plaque at the right carotid bulb without stenosis. 4. Incidental note of a 1.1 cm circumscribed solid enhancing mass of the deep aspect of the left parotid gland. This is nonspecific and could be benign or malignant in etiology. 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 ENT consultation    recommended for follow-up.     5. 7 mm groundglass density nodule at the right lung apex which is most likely benign or inflammatory in etiology but technically nonspecific. Recommend a follow-up chest CT without contrast in 3 months to evaluate for persistence of the groundglass    density.        Thank you,  Wilson RAMACHANDRANN, RN   Lung Nodule Navigator  [facility]  [phone]

## 2022-02-24 DIAGNOSIS — K11.8 MASS OF PAROTID GLAND: Primary | ICD-10-CM

## 2022-02-28 DIAGNOSIS — I48.0 PAROXYSMAL ATRIAL FIBRILLATION (HCC): ICD-10-CM

## 2022-02-28 PROCEDURE — 93244 EXT ECG>48HR<7D REV&INTERPJ: CPT | Performed by: INTERNAL MEDICINE

## 2022-03-01 ENCOUNTER — OFFICE VISIT (OUTPATIENT)
Dept: CARDIOLOGY CLINIC | Age: 82
End: 2022-03-01
Payer: MEDICARE

## 2022-03-01 VITALS
BODY MASS INDEX: 26.21 KG/M2 | HEART RATE: 84 BPM | HEIGHT: 67 IN | SYSTOLIC BLOOD PRESSURE: 138 MMHG | WEIGHT: 167 LBS | OXYGEN SATURATION: 96 % | DIASTOLIC BLOOD PRESSURE: 60 MMHG

## 2022-03-01 DIAGNOSIS — E78.2 MIXED HYPERLIPIDEMIA: ICD-10-CM

## 2022-03-01 DIAGNOSIS — I48.91 NEW ONSET A-FIB (HCC): ICD-10-CM

## 2022-03-01 PROCEDURE — 1036F TOBACCO NON-USER: CPT | Performed by: INTERNAL MEDICINE

## 2022-03-01 PROCEDURE — 4040F PNEUMOC VAC/ADMIN/RCVD: CPT | Performed by: INTERNAL MEDICINE

## 2022-03-01 PROCEDURE — G8400 PT W/DXA NO RESULTS DOC: HCPCS | Performed by: INTERNAL MEDICINE

## 2022-03-01 PROCEDURE — 1123F ACP DISCUSS/DSCN MKR DOCD: CPT | Performed by: INTERNAL MEDICINE

## 2022-03-01 PROCEDURE — G8484 FLU IMMUNIZE NO ADMIN: HCPCS | Performed by: INTERNAL MEDICINE

## 2022-03-01 PROCEDURE — G8417 CALC BMI ABV UP PARAM F/U: HCPCS | Performed by: INTERNAL MEDICINE

## 2022-03-01 PROCEDURE — 1090F PRES/ABSN URINE INCON ASSESS: CPT | Performed by: INTERNAL MEDICINE

## 2022-03-01 PROCEDURE — 99214 OFFICE O/P EST MOD 30 MIN: CPT | Performed by: INTERNAL MEDICINE

## 2022-03-01 PROCEDURE — G8427 DOCREV CUR MEDS BY ELIG CLIN: HCPCS | Performed by: INTERNAL MEDICINE

## 2022-03-01 ASSESSMENT — ENCOUNTER SYMPTOMS
WHEEZING: 0
COLOR CHANGE: 0
BACK PAIN: 0
BLOOD IN STOOL: 0
ABDOMINAL PAIN: 0
EYE DISCHARGE: 0
COUGH: 0
VOMITING: 0
ABDOMINAL DISTENTION: 0
SHORTNESS OF BREATH: 0
CHEST TIGHTNESS: 0
FACIAL SWELLING: 0

## 2022-03-01 NOTE — ASSESSMENT & PLAN NOTE
Isolated episode while in the hospital when she had COVID pneumonia. No recurrence. Since then feeling okay. Not on anticoagulation. Cardiac monitor benign, no evidence of A. fib or flutter. Continue metoprolol.

## 2022-03-10 ENCOUNTER — OFFICE VISIT (OUTPATIENT)
Dept: INTERNAL MEDICINE CLINIC | Age: 82
End: 2022-03-10
Payer: MEDICARE

## 2022-03-10 VITALS
WEIGHT: 169 LBS | SYSTOLIC BLOOD PRESSURE: 130 MMHG | DIASTOLIC BLOOD PRESSURE: 70 MMHG | BODY MASS INDEX: 26.47 KG/M2 | HEART RATE: 89 BPM | OXYGEN SATURATION: 98 %

## 2022-03-10 DIAGNOSIS — R74.8 ELEVATED ALKALINE PHOSPHATASE LEVEL: ICD-10-CM

## 2022-03-10 DIAGNOSIS — R55 VASOVAGAL SYNCOPE: Primary | ICD-10-CM

## 2022-03-10 PROCEDURE — 1123F ACP DISCUSS/DSCN MKR DOCD: CPT | Performed by: INTERNAL MEDICINE

## 2022-03-10 PROCEDURE — 4040F PNEUMOC VAC/ADMIN/RCVD: CPT | Performed by: INTERNAL MEDICINE

## 2022-03-10 PROCEDURE — G8484 FLU IMMUNIZE NO ADMIN: HCPCS | Performed by: INTERNAL MEDICINE

## 2022-03-10 PROCEDURE — G8400 PT W/DXA NO RESULTS DOC: HCPCS | Performed by: INTERNAL MEDICINE

## 2022-03-10 PROCEDURE — 1036F TOBACCO NON-USER: CPT | Performed by: INTERNAL MEDICINE

## 2022-03-10 PROCEDURE — G8427 DOCREV CUR MEDS BY ELIG CLIN: HCPCS | Performed by: INTERNAL MEDICINE

## 2022-03-10 PROCEDURE — G8417 CALC BMI ABV UP PARAM F/U: HCPCS | Performed by: INTERNAL MEDICINE

## 2022-03-10 PROCEDURE — 1090F PRES/ABSN URINE INCON ASSESS: CPT | Performed by: INTERNAL MEDICINE

## 2022-03-10 PROCEDURE — 99214 OFFICE O/P EST MOD 30 MIN: CPT | Performed by: INTERNAL MEDICINE

## 2022-03-10 NOTE — PROGRESS NOTES
Ronnie Frank (:  1940) is a [de-identified] y.o. female,Established patient, here for evaluation of the following chief complaint(s):  Follow-Up from Hospital (passed out)      Vitals:    03/10/22 0917   BP: 130/70   Pulse: 89   SpO2: 98%        ASSESSMENT/PLAN:  1. Vasovagal syncope  Patient having multiple episodes of syncope for quite some time now. Likely that this is vasovagal in nature. Patient has had multiple work-ups for this all of which have been nonrevealing. It does appear that these episodes are occurring around the same time as a bowel movement.  -Try compression stockings  -Patient to follow back up with cardiology  -We will try adding MiraLAX to help with any difficulty with bowel movements that may be causing vasovagal episodes  -Check CMP and CBC  -     Comprehensive Metabolic Panel; Future  -     CBC with Auto Differential; Future  2. Elevated alkaline phosphatase level  This has been resolving in the hospital.  Recheck CMP. -     Comprehensive Metabolic Panel; Future    Keep current appointment     SUBJECTIVE/OBJECTIVE:  HPI    Patient is an 50-year-old female with past medical history of hypertension, PAD, hyperlipidemia, paroxysmal atrial fibrillation and dementia who presents secondary to hospital follow-up. Patient's daughter notes that [place] when she started to have abdominal discomfort. Patient's daughter took patient to the bathroom and on the way to the bathroom patient slumped over. Patient then lost consciousness and EMS was called. Patient was taken to the ER. In the ER patient was noted to have normal blood pressure and heart rate. EKG showed normal sinus rhythm. There were no signs of atrial fibrillation. Patient was found to have possible gallbladder disease with elevated alk phos. Patient was kept in the hospital secondary to this. Through hospital stay it was decided that there was no true gallbladder issue.   Patient was in normal sinus rhythm the

## 2022-03-16 ENCOUNTER — HOSPITAL ENCOUNTER (OUTPATIENT)
Dept: CT IMAGING | Age: 82
Discharge: HOME | End: 2022-03-16
Payer: MEDICARE

## 2022-03-16 DIAGNOSIS — R91.8 ABNORMAL FINDING ON LUNG IMAGING: ICD-10-CM

## 2022-03-16 PROCEDURE — 71250 CT THORAX DX C-: CPT

## 2022-03-23 ENCOUNTER — TELEPHONE (OUTPATIENT)
Dept: INTERNAL MEDICINE CLINIC | Age: 82
End: 2022-03-23

## 2022-03-23 ENCOUNTER — OFFICE VISIT (OUTPATIENT)
Dept: ENT CLINIC | Age: 82
End: 2022-03-23
Payer: MEDICARE

## 2022-03-23 VITALS
HEART RATE: 80 BPM | DIASTOLIC BLOOD PRESSURE: 79 MMHG | BODY MASS INDEX: 26.53 KG/M2 | SYSTOLIC BLOOD PRESSURE: 129 MMHG | HEIGHT: 67 IN | WEIGHT: 169 LBS

## 2022-03-23 DIAGNOSIS — H61.22 IMPACTED CERUMEN OF LEFT EAR: ICD-10-CM

## 2022-03-23 DIAGNOSIS — H81.11 BPPV (BENIGN PAROXYSMAL POSITIONAL VERTIGO), RIGHT: ICD-10-CM

## 2022-03-23 DIAGNOSIS — D49.0 PAROTID NEOPLASM: Primary | ICD-10-CM

## 2022-03-23 PROCEDURE — 1090F PRES/ABSN URINE INCON ASSESS: CPT | Performed by: OTOLARYNGOLOGY

## 2022-03-23 PROCEDURE — G8417 CALC BMI ABV UP PARAM F/U: HCPCS | Performed by: OTOLARYNGOLOGY

## 2022-03-23 PROCEDURE — 99204 OFFICE O/P NEW MOD 45 MIN: CPT | Performed by: OTOLARYNGOLOGY

## 2022-03-23 PROCEDURE — G8400 PT W/DXA NO RESULTS DOC: HCPCS | Performed by: OTOLARYNGOLOGY

## 2022-03-23 PROCEDURE — 1036F TOBACCO NON-USER: CPT | Performed by: OTOLARYNGOLOGY

## 2022-03-23 PROCEDURE — 4040F PNEUMOC VAC/ADMIN/RCVD: CPT | Performed by: OTOLARYNGOLOGY

## 2022-03-23 PROCEDURE — 69210 REMOVE IMPACTED EAR WAX UNI: CPT | Performed by: OTOLARYNGOLOGY

## 2022-03-23 PROCEDURE — G8427 DOCREV CUR MEDS BY ELIG CLIN: HCPCS | Performed by: OTOLARYNGOLOGY

## 2022-03-23 PROCEDURE — 1123F ACP DISCUSS/DSCN MKR DOCD: CPT | Performed by: OTOLARYNGOLOGY

## 2022-03-23 PROCEDURE — G8484 FLU IMMUNIZE NO ADMIN: HCPCS | Performed by: OTOLARYNGOLOGY

## 2022-03-23 ASSESSMENT — ENCOUNTER SYMPTOMS
EYE DISCHARGE: 0
DIARRHEA: 0
BLOOD IN STOOL: 0
COUGH: 0
WHEEZING: 0
RHINORRHEA: 0
SHORTNESS OF BREATH: 0
FACIAL SWELLING: 0
BACK PAIN: 0
EYE ITCHING: 0
VOMITING: 0
PHOTOPHOBIA: 0
COLOR CHANGE: 0
VOICE CHANGE: 0
SINUS PRESSURE: 0
CONSTIPATION: 0
CHOKING: 0
SORE THROAT: 0
STRIDOR: 0
TROUBLE SWALLOWING: 0
NAUSEA: 0
SINUS PAIN: 0

## 2022-03-23 NOTE — PROGRESS NOTES
[facility]  [address] [address] [facility], [address] [place] [address]  P: [phone]  F: [phone]       Patient     Cruzito Avery  1940    ChiefComplaint     Chief Complaint   Patient presents with    New Patient     Patient is here today because she has enlarged partoid gland    Ear Problem     Her left ear is impacted with wax which maybe causing her dizziness when she lays down       History of Present Illness     Cruzito Avery is a pleasant [de-identified] y.o. 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 Patient has been having vasovagal syncope and falls. CTA of the head and neck performed towards the end of last year revealed an 11 mm enhancing neoplasm within the left deep parotid lobe. It appears to be relatively well-circumscribed. Patient denies any facial weakness, neck pain, other neck masses. Denies a history of alcohol or tobacco use. Denies fevers, chills, night sweats or unexpected weight loss. Additionally, she has been experiencing some vertigo symptoms since one of her falls in the past 6 months. She notices mostly when she is laying in bed. She experiences a brief sensation of spinning and motion. Does admit to some occasional nausea but no vomiting. Episodes are typically self-limited. Denies any change in hearing, tinnitus, otalgia, otorrhea. Denies a prior history of ear infections or ear surgeries. Denies a family history of ear problems. Past Medical History     Past Medical History:   Diagnosis Date    Anxiety     Chronic renal insufficiency     COVID-19 virus infection 12/30/2021    Dementia ([address]     Hyperlipidemia     Hypertension     Peripheral arterial disease ([address]     Prediabetes        Past Surgical History     No past surgical history on file. Family History     No family history on file. Social History     Social History     Socioeconomic History    Marital status:       Spouse name: Not on file    Number of children: Not on file    Years of education: Not on file    Highest education level: Not on file   Occupational History    Not on file   Tobacco Use    Smoking status: Never Smoker    Smokeless tobacco: Never Used   Vaping Use    Vaping Use: Never used   Substance and Sexual Activity    Alcohol use: Never    Drug use: Never    Sexual activity: Not on file   Other Topics Concern    Not on file   Social History Narrative    Not on file     Social Determinants of Health     Financial Resource Strain: Low Risk     Difficulty of Paying Living Expenses: Not hard at all   Food Insecurity: No Food Insecurity    Worried About Running Out of Food in the Last Year: Never true    [employer]ata of Food in the Last Year: Never true   Transportation Needs: No Transportation Needs    Lack of Transportation (Medical): No    Lack of Transportation (Non-Medical):  No   Physical Activity:     Days of Exercise per Week: Not on file    Minutes of Exercise per Session: Not on file   Stress:     Feeling of Stress : Not on file   Social Connections:     Frequency of Communication with Friends and Family: Not on file    Frequency of Social Gatherings with Friends and Family: Not on file    Attends Mandaeism Services: Not on file    Active Member of Clubs or Organizations: Not on file    Attends Club or Organization Meetings: Not on file    Marital Status: Not on file   Intimate Partner Violence:     Fear of Current or Ex-Partner: Not on file    Emotionally Abused: Not on file    Physically Abused: Not on file    Sexually Abused: Not on file   Housing Stability: Unknown    Unable to Pay for Housing in the Last Year: No    Number of [place] in the Last Year: Not on file    Unstable Housing in the Last Year: No       Allergies     Allergies   Allergen Reactions    Clarithromycin      CLARITHROMYCIN: headache - Converted from [facility] [facility] [place] SULFAMETHOXAZOLE-TRIMETHOPRIM: headaches - Converted from [employer]       Medications     Current Outpatient Medications   Medication Sig Dispense Refill    metoprolol tartrate (LOPRESSOR) 25 MG tablet Take 0.5 tablets by mouth 2 times daily 90 tablet 3    atorvastatin (LIPITOR) 40 MG tablet TAKE 1 TABLET BY MOUTH DAILY 90 tablet 1    ketoconazole (NIZORAL) 2 % cream Apply topically daily. 60 g 3    mirtazapine (REMERON) 15 MG tablet TAKE 1 TABLET BY MOUTH EVERY DAY AT NIGHT 90 tablet 1    donepezil (ARICEPT) 5 MG tablet TAKE 1 TABLET BY MOUTH EVERY DAY AT NIGHT 90 tablet 1    aspirin 81 MG EC tablet Take 81 mg by mouth nightly        No current facility-administered medications for this visit. Review of Systems     Review of Systems   Constitutional: Negative for activity change, appetite change, chills, diaphoresis, fatigue, fever and unexpected weight change. HENT: Negative for congestion, dental problem, drooling, ear discharge, ear pain, facial swelling, hearing loss, mouth sores, nosebleeds, postnasal drip, rhinorrhea, sinus pressure, sinus pain, sneezing, sore throat, tinnitus, trouble swallowing and voice change. Eyes: Negative for photophobia, discharge, itching and visual disturbance. Respiratory: Negative for cough, choking, shortness of breath, wheezing and stridor. Gastrointestinal: Negative for blood in stool, constipation, diarrhea, nausea and vomiting. Endocrine: Negative for cold intolerance, heat intolerance, polyphagia and polyuria. Musculoskeletal: Negative for back pain, gait problem, neck pain and neck stiffness. Skin: Negative for color change, pallor, rash and wound. Neurological: Positive for dizziness. Negative for syncope, facial asymmetry, speech difficulty, light-headedness, numbness and headaches. Hematological: Negative for adenopathy. Does not bruise/bleed easily. Psychiatric/Behavioral: Negative for agitation, confusion and sleep disturbance. PhysicalExam     Vitals:    03/23/22 1253   BP: 129/79   Pulse: 80       Physical Exam  Constitutional:       Appearance: She is well-developed. HENT:      Head: Normocephalic and atraumatic. Not macrocephalic and not microcephalic. No raccoon eyes, Clay's sign, abrasion, contusion, right periorbital erythema, left periorbital erythema or laceration. Hair is normal.      Jaw: No trismus. Right Ear: Hearing, tympanic membrane and external ear normal. No decreased hearing noted. No drainage, swelling or tenderness. No middle ear effusion. No mastoid tenderness. Tympanic membrane is not perforated, retracted or bulging. Tympanic membrane has normal mobility. Left Ear: Hearing, tympanic membrane and external ear normal. No decreased hearing noted. No drainage, swelling or tenderness. No middle ear effusion. There is impacted cerumen. No mastoid tenderness. Tympanic membrane is not perforated, retracted or bulging. Tympanic membrane has normal mobility. Nose: No nasal deformity, septal deviation, laceration, mucosal edema or rhinorrhea. Right Sinus: No maxillary sinus tenderness or frontal sinus tenderness. Left Sinus: No maxillary sinus tenderness or frontal sinus tenderness. Mouth/Throat:      Mouth: Mucous membranes are not pale, not dry and not cyanotic. No lacerations or oral lesions. Dentition: Normal dentition. No dental caries or dental abscesses. Pharynx: Uvula midline. No oropharyngeal exudate, posterior oropharyngeal erythema or uvula swelling. Tonsils: No tonsillar abscesses. Eyes:      General: Lids are normal.         Right eye: No discharge. Left eye: No discharge. Extraocular Movements:      Right eye: Normal extraocular motion and no nystagmus. Left eye: Normal extraocular motion and no nystagmus. Conjunctiva/sclera:      Right eye: No chemosis or exudate. Left eye: No chemosis or exudate.   Neck:      Thyroid: No thyroid mass or thyromegaly. Vascular: Normal carotid pulses. Trachea: No tracheal tenderness or tracheal deviation. Cardiovascular:      Rate and Rhythm: Normal rate and regular rhythm. Pulmonary:      Effort: No tachypnea, bradypnea or respiratory distress. Breath sounds: No stridor. Musculoskeletal:      Right shoulder: Normal range of motion. Cervical back: Neck supple. Lymphadenopathy:      Head:      Right side of head: No submental, submandibular, tonsillar, preauricular, posterior auricular or occipital adenopathy. Left side of head: No submental, submandibular, tonsillar, preauricular, posterior auricular or occipital adenopathy. Cervical: No cervical adenopathy. Right cervical: No superficial, deep or posterior cervical adenopathy. Left cervical: No superficial, deep or posterior cervical adenopathy. Skin:     General: Skin is warm and dry. Findings: No bruising, erythema, laceration, lesion or rash. Neurological:      Mental Status: She is alert and oriented to person, place, and time. Cranial Nerves: No cranial nerve deficit. Comments: Deep-Hallpike positive on the right, negative on the left   Psychiatric:         Speech: Speech normal.         Behavior: Behavior normal.           Procedure     Removal Impacted Cerumen    An operating microscope was utilized to visualize the external auditory canals using a 4mm speculum. Cerumen was removed with curettes and martinez suctions on the left side. The tympanic membrane is intact. No fluid visualized in the middle ear. No complications. Assessment and Plan     1. 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 It appears well-circumscribed. Patient is asymptomatic. Recommend ultrasound-guided fine-needle aspiration to ascertain whether this is malignant or not.   Patient and daughter are leaning towards no surgical intervention given the patient's age. May follow with serial imaging. Will contact patient with biopsy results. - US FINE NEEDLE ASPIRATION; Future    2. BPPV (benign paroxysmal positional vertigo), right  Patient has symptoms and exam findings consistent with right BPPV. Will refer to vestibular therapy. - PT vestibular rehab; Future    3. Impacted cerumen of left ear  Left side impacted cerumen removed with instruments today. Return for biopsy results. Portions of this note were dictated using Dragon.  There may be linguistic errors secondary to the use of this program.

## 2022-03-29 ENCOUNTER — APPOINTMENT (OUTPATIENT)
Dept: CT IMAGING | Age: 82
End: 2022-03-29
Payer: MEDICARE

## 2022-03-29 ENCOUNTER — HOSPITAL ENCOUNTER (EMERGENCY)
Age: 82
Discharge: HOME | End: 2022-03-29
Attending: EMERGENCY MEDICINE
Payer: MEDICARE

## 2022-03-29 ENCOUNTER — APPOINTMENT (OUTPATIENT)
Dept: GENERAL RADIOLOGY | Age: 82
End: 2022-03-29
Payer: MEDICARE

## 2022-03-29 VITALS
SYSTOLIC BLOOD PRESSURE: 133 MMHG | DIASTOLIC BLOOD PRESSURE: 58 MMHG | TEMPERATURE: 97.6 F | RESPIRATION RATE: 15 BRPM | OXYGEN SATURATION: 98 % | HEART RATE: 71 BPM
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
## 2022-03-29 DIAGNOSIS — R41.82 ALTERED MENTAL STATUS, UNSPECIFIED ALTERED MENTAL STATUS TYPE: Primary | ICD-10-CM

## 2022-03-29 LAB
AMORPHOUS: NORMAL /HPF
ANION GAP SERPL CALCULATED.3IONS-SCNC: 9 MMOL/L (ref 3–16)
BASOPHILS ABSOLUTE: 0 K/UL (ref 0–0.2)
BASOPHILS RELATIVE PERCENT: 0.4 %
BILIRUBIN URINE: NEGATIVE
BLOOD, URINE: ABNORMAL
BUN BLDV-MCNC: 20 MG/DL (ref 7–20)
CALCIUM SERPL-MCNC: 9.1 MG/DL (ref 8.3–10.6)
CHLORIDE BLD-SCNC: 101 MMOL/L (ref 99–110)
CLARITY: CLEAR
CO2: 28 MMOL/L (ref 21–32)
COLOR: YELLOW
CREAT SERPL-MCNC: 1.1 MG/DL (ref 0.6–1.2)
EKG ATRIAL RATE: 65 BPM
EKG DIAGNOSIS: NORMAL
EKG P AXIS: 65 DEGREES
EKG P-R INTERVAL: 194 MS
EKG Q-T INTERVAL: 434 MS
EKG QRS DURATION: 80 MS
EKG QTC CALCULATION (BAZETT): 451 MS
EKG R AXIS: 78 DEGREES
EKG T AXIS: 76 DEGREES
EKG VENTRICULAR RATE: 65 BPM
EOSINOPHILS ABSOLUTE: 0.2 K/UL (ref 0–0.6)
EOSINOPHILS RELATIVE PERCENT: 2.8 %
EPITHELIAL CELLS, UA: NORMAL /HPF (ref 0–5)
GFR AFRICAN AMERICAN: 58
GFR NON-AFRICAN AMERICAN: 48
GLUCOSE BLD-MCNC: 127 MG/DL (ref 70–99)
GLUCOSE URINE: NEGATIVE MG/DL
HCT VFR BLD CALC: 41.1 % (ref 36–48)
HEMOGLOBIN: 13.3 G/DL (ref 12–16)
HYALINE CASTS: NORMAL /LPF (ref 0–2)
KETONES, URINE: NEGATIVE MG/DL
LEUKOCYTE ESTERASE, URINE: NEGATIVE
LYMPHOCYTES ABSOLUTE: 1.3 K/UL (ref 1–5.1)
LYMPHOCYTES RELATIVE PERCENT: 14.7 %
MCH RBC QN AUTO: 28.8 PG (ref 26–34)
MCHC RBC AUTO-ENTMCNC: 32.3 G/DL (ref 31–36)
MCV RBC AUTO: 89.2 FL (ref 80–100)
MICROSCOPIC EXAMINATION: YES
MONOCYTES ABSOLUTE: 0.8 K/UL (ref 0–1.3)
MONOCYTES RELATIVE PERCENT: 9.5 %
NEUTROPHILS ABSOLUTE: 6.3 K/UL (ref 1.7–7.7)
NEUTROPHILS RELATIVE PERCENT: 72.6 %
NITRITE, URINE: NEGATIVE
PDW BLD-RTO: 15 % (ref 12.4–15.4)
PH UA: 5.5 (ref 5–8)
PLATELET # BLD: 276 K/UL (ref 135–450)
PMV BLD AUTO: 8.2 FL (ref 5–10.5)
POTASSIUM REFLEX MAGNESIUM: 3.8 MMOL/L (ref 3.5–5.1)
PROTEIN UA: 30 MG/DL
RBC # BLD: 4.61 M/UL (ref 4–5.2)
RBC UA: NORMAL /HPF (ref 0–4)
SODIUM BLD-SCNC: 138 MMOL/L (ref 136–145)
SPECIFIC GRAVITY UA: >=1.03 (ref 1–1.03)
URINE REFLEX TO CULTURE: ABNORMAL
URINE TYPE: ABNORMAL
UROBILINOGEN, URINE: 0.2 E.U./DL
WBC # BLD: 8.7 K/UL (ref 4–11)
WBC UA: NORMAL /HPF (ref 0–5)

## 2022-03-29 PROCEDURE — 6370000000 HC RX 637 (ALT 250 FOR IP): Performed by: STUDENT IN AN ORGANIZED HEALTH CARE EDUCATION/TRAINING PROGRAM

## 2022-03-29 PROCEDURE — 70450 CT HEAD/BRAIN W/O DYE: CPT

## 2022-03-29 PROCEDURE — 81001 URINALYSIS AUTO W/SCOPE: CPT

## 2022-03-29 PROCEDURE — 85025 COMPLETE CBC W/AUTO DIFF WBC: CPT

## 2022-03-29 PROCEDURE — 71046 X-RAY EXAM CHEST 2 VIEWS: CPT

## 2022-03-29 PROCEDURE — 80048 BASIC METABOLIC PNL TOTAL CA: CPT

## 2022-03-29 PROCEDURE — 72125 CT NECK SPINE W/O DYE: CPT

## 2022-03-29 PROCEDURE — 93005 ELECTROCARDIOGRAM TRACING: CPT | Performed by: STUDENT IN AN ORGANIZED HEALTH CARE EDUCATION/TRAINING PROGRAM

## 2022-03-29 PROCEDURE — 2580000003 HC RX 258: Performed by: EMERGENCY MEDICINE

## 2022-03-29 PROCEDURE — 72170 X-RAY EXAM OF PELVIS: CPT

## 2022-03-29 PROCEDURE — 99284 EMERGENCY DEPT VISIT MOD MDM: CPT

## 2022-03-29 RX ORDER — SODIUM CHLORIDE, SODIUM LACTATE, POTASSIUM CHLORIDE, AND CALCIUM CHLORIDE .6; .31; .03; .02 G/100ML; G/100ML; G/100ML; G/100ML
500 INJECTION, SOLUTION INTRAVENOUS ONCE
Status: COMPLETED | OUTPATIENT
Start: 2022-03-29 | End: 2022-03-29

## 2022-03-29 RX ORDER — ACETAMINOPHEN 500 MG
1000 TABLET ORAL ONCE
Status: COMPLETED | OUTPATIENT
Start: 2022-03-29 | End: 2022-03-29

## 2022-03-29 RX ADMIN — ACETAMINOPHEN 1000 MG: 500 TABLET ORAL at 14:58

## 2022-03-29 RX ADMIN — SODIUM CHLORIDE, SODIUM LACTATE, POTASSIUM CHLORIDE, AND CALCIUM CHLORIDE 500 ML: .6; .31; .03; .02 INJECTION, SOLUTION INTRAVENOUS at 16:30

## 2022-03-29 ASSESSMENT — ENCOUNTER SYMPTOMS
COUGH: 0
BACK PAIN: 1
NAUSEA: 0
SHORTNESS OF BREATH: 0
VOICE CHANGE: 0
ABDOMINAL PAIN: 0
VOMITING: 1
SORE THROAT: 0
WHEEZING: 0
EYE DISCHARGE: 0

## 2022-03-29 ASSESSMENT — PAIN SCALES - GENERAL: PAINLEVEL_OUTOF10: 0

## 2022-03-29 NOTE — ED NOTES
Pt. In no acute distress. Educated on follow up care and Left ED with family/caregiver.       Jarrell José RN  03/29/22 [id number]

## 2022-03-29 NOTE — ED PROVIDER NOTES
[facility] RESIDENT NOTE       Date of evaluation: 3/29/2022    Chief Complaint     Fall (unwitnessed fall in bathroom at home pluse one episode of vomiting) and Altered Mental Status      History of Present Illness     Kalyn Hargrove is a [de-identified] y.o. female who presents secondary to unwitnessed fall from home while in the bathroom as well as AMS right now. PMH includes recent diagnosis of A. fib only on aspirin, dementia, history of lower back pain, HTN, multiple syncopal events, History of acute metabolic encephalopathy. Patient was in the restroom and had an unwitnessed fall and was brought to the ED for evaluation. On report by EMS that she did have a one-time vomiting event on over the sink and was given Zofran in route. On arrival patient was oriented x2 but was not oriented toward situation or time. Is nonspecific in regards to her symptoms and states that she did hit her head however upon EMS report states that there was no trauma to the head. She additionally states that she did fall and hit the bottom side of her sacrum has pain in her sacral region. Patient was lethargic and not providing significant history on discussion with her and cannot elaborate to her medical history/recent fall. On further discussion with daughter who was available states that there was no fall and that patient did have a what looks like a vasovagal event which has been known. Was able to catch herself orthostatic and ended up vomiting at home once. Then presented to the ED. Review of Systems     Review of Systems   Constitutional: Negative for chills, diaphoresis and fever. HENT: Negative for sore throat and voice change. Eyes: Negative for discharge. Respiratory: Negative for cough, shortness of breath and wheezing. Cardiovascular: Negative for chest pain and palpitations. Gastrointestinal: Positive for vomiting. Negative for abdominal pain and nausea. Genitourinary: Negative for dysuria and urgency. Musculoskeletal: Positive for back pain, gait problem and myalgias. Neurological: Negative for dizziness. Psychiatric/Behavioral: Positive for confusion. Past Medical, Surgical, Family, and Social History     She has a past medical history of Anxiety, Chronic renal insufficiency, COVID-19 virus infection, Dementia ([facility] [address] Hyperlipidemia, Hypertension, Peripheral arterial disease ([facility] [address] and Prediabetes. She has no past surgical history on file. Her family history is not on file. She reports that she has never smoked. She has never used smokeless tobacco. She reports that she does not drink alcohol and does not use drugs. Medications     Previous Medications    ASPIRIN 81 MG EC TABLET    Take 81 mg by mouth nightly     ATORVASTATIN (LIPITOR) 40 MG TABLET    TAKE 1 TABLET BY MOUTH DAILY    DONEPEZIL (ARICEPT) 5 MG TABLET    TAKE 1 TABLET BY MOUTH EVERY DAY AT NIGHT    KETOCONAZOLE (NIZORAL) 2 % CREAM    Apply topically daily. METOPROLOL TARTRATE (LOPRESSOR) 25 MG TABLET    Take 0.5 tablets by mouth 2 times daily    MIRTAZAPINE (REMERON) 15 MG TABLET    TAKE 1 TABLET BY MOUTH EVERY DAY AT NIGHT       Allergies     She is allergic to clarithromycin and sulfamethoxazole-trimethoprim. Physical Exam     INITIAL VITALS: BP: (!) 150/60, Temp: 97.6 °F (36.4 °C), Pulse: 62, Resp: 12, SpO2: 95 %   Physical Exam  Constitutional:       General: She is not in acute distress. Appearance: She is obese. She is not ill-appearing or diaphoretic. HENT:      Head: Normocephalic and atraumatic. Eyes:      Extraocular Movements: Extraocular movements intact. Pupils: Pupils are equal, round, and reactive to light. Cardiovascular:      Rate and Rhythm: Normal rate and regular rhythm. Pulses: Normal pulses. Heart sounds: No murmur heard. Pulmonary:      Breath sounds: No wheezing or rales. Abdominal:      General: Abdomen is flat.  Bowel g/dL    Hematocrit 41.1 36.0 - 48.0 %    MCV 89.2 80.0 - 100.0 fL    MCH 28.8 26.0 - 34.0 pg    MCHC 32.3 31.0 - 36.0 g/dL    RDW 15.0 12.4 - 15.4 %    Platelets 918 457 - 513 K/uL    MPV 8.2 5.0 - 10.5 fL    Neutrophils % 72.6 %    Lymphocytes % 14.7 %    Monocytes % 9.5 %    Eosinophils % 2.8 %    Basophils % 0.4 %    Neutrophils Absolute 6.3 1.7 - 7.7 K/uL    Lymphocytes Absolute 1.3 1.0 - 5.1 K/uL    Monocytes Absolute 0.8 0.0 - 1.3 K/uL    Eosinophils Absolute 0.2 0.0 - 0.6 K/uL    Basophils Absolute 0.0 0.0 - 0.2 K/uL   Basic Metabolic Panel w/ Reflex to MG   Result Value Ref Range    Sodium 138 136 - 145 mmol/L    Potassium reflex Magnesium 3.8 3.5 - 5.1 mmol/L    Chloride 101 99 - 110 mmol/L    CO2 28 21 - 32 mmol/L    Anion Gap 9 3 - 16    Glucose 127 (H) 70 - 99 mg/dL    BUN 20 7 - 20 mg/dL    CREATININE 1.1 0.6 - 1.2 mg/dL    GFR Non- 48 (A) >60    GFR  58 (A) >60    Calcium 9.1 8.3 - 10.6 mg/dL   Urinalysis with Reflex to Culture    Specimen: Urine   Result Value Ref Range    Color, UA Yellow Straw/Yellow    Clarity, UA Clear Clear    Glucose, Ur Negative Negative mg/dL    Bilirubin Urine Negative Negative    Ketones, Urine Negative Negative mg/dL    Specific Gravity, UA >=1.030 1.005 - 1.030    Blood, Urine LARGE (A) Negative    pH, UA 5.5 5.0 - 8.0    Protein, UA 30 (A) Negative mg/dL    Urobilinogen, Urine 0.2 <2.0 E.U./dL    Nitrite, Urine Negative Negative    Leukocyte Esterase, Urine Negative Negative    Microscopic Examination YES     Urine Type NotGiven     Urine Reflex to Culture Not Indicated    Microscopic Urinalysis   Result Value Ref Range    Hyaline Casts, UA 0-2 0 - 2 /LPF    WBC, UA 3-5 0 - 5 /HPF    RBC, UA None seen 0 - 4 /HPF    Epithelial Cells, UA 0-1 0 - 5 /HPF    Amorphous, UA 2+ /HPF   EKG 12 Lead   Result Value Ref Range    Ventricular Rate 65 BPM    Atrial Rate 65 BPM    P-R Interval 194 ms    QRS Duration 80 ms    Q-T Interval 434 ms    QTc Calculation (Davyzesilvio) 451 ms    P Axis 65 degrees    R Axis 78 degrees    T Axis 76 degrees    Diagnosis       EKG performed in ER and to be interpreted by ER physician. Confirmed by MD, ER ([id number]),  Ry Emmanuel ([phone]) on 3/29/2022 3:01:31 PM       ED BEDSIDE ULTRASOUND:  none    RECENT VITALS:  BP: (!) 150/60, Temp: 97.6 °F (36.4 °C),Pulse: 62, Resp: 12, SpO2: 95 %     Procedures     none    ED Course     Nursing Notes, Past Medical Hx, Past Surgical Hx, Social Hx, Allergies, and FamilyHx were reviewed. The patient was giventhe following medications:  Orders Placed This Encounter   Medications    acetaminophen (TYLENOL) tablet 1,000 mg    lactated ringers bolus       CONSULTS:  None    MEDICAL DECISION MAKING / ASSESSMENT / Randy Libby is a [de-identified] y.o. female who presented secondary to fall as well as vomiting, altered mental status. On arrival discussed with EMS the history given the fact that it was an unwitnessed fall her presentation decided to order a CT head and neck. Did order an infectious work-up for the patient given her altered mental status including CBC, BMP, UA, chest x-ray. On further discussion with daughter denies any decrease in p.o. intake overall however states that there was significant vomiting this morning. On reevaluation patient daughter states that patient is more lethargic and tired in general but her mentation is similar to her baseline. Did decide to bolus the patient with 500 cc of fluid. Urinalysis did return and only showed evidence of some blood and protein however creatinine levels were negative. This may have been a result of a traumatic straight cath. I did rediscussed with the patient the option of going home for continued management of her symptoms. Daughter states that she does feel like the patient is at her baseline other than being tired which is very common after she has these vasovagal events.   States that she is fine with going home with her mother. Did assess the patient for ambulation which she was able to do but did recommend that on discharge patient should have assistance while ambulating. .  Patient was hemodynamically stable on discharge and was back to her baseline. This patient was also evaluated by the attending physician. All care plans were discussed and agreed upon. Clinical Impression     1. Altered mental status, unspecified altered mental status type    2.  Fall, initial encounter        Disposition     PATIENT REFERRED TO:  Alisson Yanez, [address]  [phone]      As needed      DISCHARGE MEDICATIONS:  New Prescriptions    No medications on file       DISPOSITION Decision To Discharge 03/29/2022 05:41:49 PM   home      Tod Gonzalez,   Resident  03/29/22 9509

## 2022-03-29 NOTE — ED PROVIDER NOTES
ED Attending Attestation Note     Date of evaluation: 3/29/2022    This patient was seen by the resident. I have seen and examined the patient, agree with the workup, evaluation, management and diagnosis. The care plan has been discussed. I have reviewed the ECG and concur with the resident's interpretation. I was present for any procedures performed in the resident's  note and have made edits to the note where appropriate. My assessment reveals [de-identified] y.o. female with history of dementia, PAD, hypertension, hyperlipidemia, obesity, multiple other comorbidities presenting to the emergency department today for a fall and possible altered mental status. On my examination she is alert, well-appearing, has no complaints. She has no signs of head or neck injury. Chest and abdomen are nontender. Her spine is nontender and without step-offs. Pelvis is stable and without pain to palpation or with logroll of legs. She is oriented to name only, however this is her baseline per family.          Jad Sahu MD  03/29/22 0912

## 2022-03-30 ENCOUNTER — CARE COORDINATION (OUTPATIENT)
Dept: CARE COORDINATION | Age: 82
End: 2022-03-30

## 2022-03-31 ENCOUNTER — HOSPITAL ENCOUNTER (OUTPATIENT)
Dept: PHYSICAL THERAPY | Age: 82
Setting detail: THERAPIES SERIES
Discharge: HOME | End: 2022-03-31
Payer: MEDICARE

## 2022-03-31 PROCEDURE — 97530 THERAPEUTIC ACTIVITIES: CPT

## 2022-03-31 PROCEDURE — 97162 PT EVAL MOD COMPLEX 30 MIN: CPT

## 2022-03-31 PROCEDURE — 95992 CANALITH REPOSITIONING PROC: CPT

## 2022-03-31 NOTE — PLAN OF CARE
[facility] Outpatient Therapy  [address] [place] [address], [place] [address], [address]  Phone: [phone]   Fax: [phone]                                                       Physical Therapy Certification    Dear Referring Practitioner: Dr. Sonali Garcia,    We had the pleasure of evaluating the following patient for physical therapy services at [facility] ([facility] A summary of our findings can be found in the initial assessment below. This includes our plan of care. If you have any questions or concerns regarding these findings, please do not hesitate to contact me at the office phone number checked above. Thank you for the referral.       Physician Signature:_______________________________Date:__________________  By signing above (or electronic signature), therapist's plan is approved by physician      Patient: Sofya Jain   : 1940   MRN: [id number]   Referring Physician: Referring Practitioner: Dr. Sonali Garcia       Evaluation Date: 3/31/2022       Medical Diagnosis Information:   Diagnosis: H81.1 BPPV Right           Treatment diagnosis: H81. 1BPPV R ( Vertigo w/ sit to supine due to R BPPV)  Insurance information: PT Insurance Information: Medicare     Precautions/ Contra-indications: dementia, low back pain, impaired balance  Latex Allergy:  NO      YES   Preferred Language for Healthcare:   English       other:     Time in:   1000      Time out: 1100 Total Treatment Time:  40  ______________________________________________________________________    SUBJECTIVE:      Referral Date: 3/23/22  Onset Date: end of Dec. 2021    Chief Complaint: spinning w/ getting into bed towards L side  Setting in which symptoms first occurred: Pt sustained a fall in Dec. 2021 (while had Covid) striking her head. Vertigo started immediately after that episode. Of note, pt was in ED on 3/29/22 due to a vasa vagal response triggered by bowel issues causing her to pause out.  This has been an ongoing issue since 3 years ago in [place]. She had complete medical work up and changed meds and was fine for 3 years. Then it started again in Oct. 2021. She was recommended to use compression stockings but neither she nor her daughter can don them even w/ a stocking aid device. Pt has a-fib since having covid. Description of symptoms: [x] Vertigo []  Off-balance [x] Lightheadedness- when stands up  Scale: 0/10 at rest   Symptoms are getting:  [] Better [] Worse  [x] Same [] Episodic  Description of Spells:  [] Constant [] Spontaneous [] Induced by motion   [x] Induced by position changes [] Other:  Length of time spells occur: [x] Seconds [x] Minutes  [] Hours [] Days [] Other:   What increases symptoms? Laying down  What decreases symptoms? nothing    Hearing impairments:   [] Yes  [x] No  [] Other:  Hearing changes since onset:  [] Yes  [x] No  [] Other:  Visual changes since onset:  [] Yes  [x] No  [] Other:  Recent falls:    [x] Yes  [] No   Comments: most recent was in Dec., passes out but usually someone catches her. Has had 3-4 falls    History of migraines/HA:  [] Yes  [x] No  Comments:  Previous treatments: none  Job requirements/work status: has 24/7 caregiver(family), in the process of hiring a caregiver for daytime care. Retired Living Status/Prior Level of Function: Prior to this injury / incident, patient required assist w/ mobility, ADLs and IADLs. C-SSRS Suicide Screening 3/29/2022   1) Within the past month, have you wished you were dead or wished you could go to sleep and not wake up? No   2) Have you actually had any thoughts of killing yourself? No   6) Have you ever done anything, started to do anything, or prepared to do anything to end your life?  No         Co-morbidities/Complexities (which will affect course of rehabilitation):   []None        [x]Hx of COVID- 10/2021 and reinfection 12/30/21   Arthritic conditions   []Rheumatoid arthritis (M05.9)  [x]Osteoarthritis (M19.91)  []Gout Cardiovascular conditions   [x]Hypertension (I10)  [x]Hyperlipidemia (E78.5)  []Angina pectoris (I20)  []Atherosclerosis (I70)  []Pacemaker  []Hx of CABG/stent/  cardiac surgeries  PAD   Musculoskeletal conditions   []Disc pathology   []Congenital spine pathologies   []Osteoporosis (M81.8)  []Osteopenia (M85.8)  []Scoliosis       Endocrine conditions   []Hypothyroid (E03.9)  []Hyperthyroid Gastrointestinal conditions   []Constipation (K59.00)  [] Diarrhea   Metabolic conditions   []Morbid obesity (E66.01)  [x]Diabetes type 1(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.65) or 2 (E11.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  []Neuropathy (G60.9)     Cardio/Pulmonary conditions   []Asthma (J45)  []Coughing   []COPD (J44.9)  []CHF  [x]A-fib   Psychological Disorders  [x]Anxiety (F41.9)  []Depression (F32.9)   []Other:   Developmental Disorders  []Autism (F84.0)  []CP (G80)  []Down Syndrome (Q90.9)  []Developmental delay     Neurological conditions  []Prior Stroke (I69.30)  []Parkinson's (G20)  []Encephalopathy (G93.40)  []MS (G35)  []Post-polio (G14)  []SCI  []TBI  []ALS  Dementia Other conditions  []Fibromyalgia (M79.7)  []Vertigo  []Syncope  []Kidney Failure  []Cancer      []currently undergoing                treatment  []Pregnancy  []Incontinence     Prior surgeries  []involved limb  []previous spinal surgery  [] section birth  []hysterectomy  []bowel / bladder surgery  []other relevant surgeries       OBJECTIVE:   BP L UE sitting: NT (family managing meds based on vitals)    Musculoskeletal Screen  Cervical spine complaints: none  Cervical Pain:    Cervical spine ROM:  [x]  WNL [] Impaired:    LE ROM:    LEFT RIGHT    Hip OA OA    Knee lascks full ext Lacks full ext    Ankle       LE Strength:   LEFT RIGHT    Hip flexion      Hip IR      Hip ER      Knee extension      Knee flexion      Ankle       Posture: significant trunk flexion, kyphosis, rounded shoulders, forward head    Palpation:    Somatosensory:  Light touch:  [x] Normal [] Impaired Comments:    Coordination:  Rapid alternating movements: [] Normal [] Dysdiadokinesia   Finger to Nose:   [x] Normal [] Dysmetric   Heel to Shin:    [x] Normal [] Ataxic (sligtly impaired w/ increased speed)     Postural Control Tests:  Clinical Test of Sensory Interaction for Balance (CTSIB) performed in Romberg stance  CONDITION TIME STRATEGY SWAY    Eyes open, firm surface 30 Ankle, knee, hip Shakey, no sway    Eyes closed, firm surface 30 Ankle, knee, hip Shakey, no sway    Eyes open, foam surface NT      Eyes closed, foam surface NT       Tandem stance:     Gait:    Assistive Device: rollator Forward flexed posture, poor proximity to rollator   Orthosis: At self-initiated pace:  Ayala: slow  ISAAC: narrow        Arm swing:    Head/trunk rotation: none      Straight path:   Swerves:       Staggers:    Side-steps:    Gait speed:     Oculomotor/Vestibular Examination: Limited exam due to pt's limitations in following commands.      Spontaneous nystagmus:  [] Left  [] Right [x] Absent  Gaze-Evoked nystagmus with fixation present:   Primary [] Present [x] Absent   Right  [] Present [x] Absent   Left  [] Present [x] Absent  VOR Head Thrust Test: [] Normal [] Abnormal  Comments: inconclusive, pt unable to follow commands to maintain eyes on target  VOR Cancellation:  [] Normal [] Abnormal Comments: inconclusive, t unable to follow commands to maintain eyes on target  Smooth Pursuit:  [x] Normal [] Abnormal Comments:   Saccades:   [x] Normal [] Abnormal Comments:   Convergence:   [] Normal [] Abnormal Comments:   Static Visual Acuity:    Dynamic Visual Acuity:    Positional Testing  Test of provocation: [x] Negative  [] Positive    Positional Testing  R Hallpike-B[place] maneuver: (performed over 3 pillows w/ assist of 2 persons due to back pain)   Nystagmus:  [x] Yes  [] No  [] Duration:  30 sec     [x] Direction: R rotary (limited by pt maintaining eyes closed initially)   Vertigo:  [x] Yes  [] No  [] Duration: 30sec  L Hallpike-Deep maneuver: NT due to pt's intolerance of position changes and limited direction following   Nystagmus:  [] Yes  [] No  [] Duration:       [] Direction:    Vertigo:  [] Yes  [] No  [] Duration:   Supine roll head right:   Nystagmus:  [] Yes  [] No  [] Duration:       [] Direction:    Vertigo:  [] Yes  [] No  [] Duration:   Supine roll head left:   Nystagmus:  [] Yes  [] No  [] Duration:       [] Direction:    Vertigo:  [] Yes  [] No  [] Duration:     Outcome Measures  Dizziness Handicap Index (DHI)    38%  Functional Gait Assessment (FGA)      Lester Balance Scale        Timed Get \"Up and Go\"       5 time sit-to-stand        Activities Specific Balance Confidence Scale (ABC)    Motion Sensitivity Quotient (MSQ)      Visual Vertigo Analogue Scale (VVAS)       [x] Patient history, allergies, meds reviewed. Medical chart reviewed. See intake form. Review Of Systems (ROS):  [x]Performed Review of systems (Integumentary, CardioPulmonary, Neurological) by intake and observation. Intake form has been scanned into medical record. Patient has been instructed to contact their primary care physician regarding ROS issues if not already being addressed at this time. Barriers to/and or personal factors that will affect rehab potential:             [x]Age  []Sex    []Smoker              [x]Motivation/Lack of Motivation                        [x]Co-Morbidities              [x]Cognitive Function, education/learning barriers              []Environmental, home barriers              []profession/work barriers  []past PT/medical experience  []other:  Justification:     Falls Risk Assessment (30 days):   [x] Falls Risk assessed and no intervention required. - pre-morbid high fall risk, currently using AD w/ family assist  [x] Falls Risk assessed and Patient requires intervention due to being higher risk   [] TUG score (>12s at risk):   [] Falls education provided, including     ASSESSMENT: Pt is [de-identified] y.o. female who presents w/ 3 month h/o vertigo w/ bed mobility. Her history is complicated by multiple syncopal events that are reportedly vaso vagal related causing falls as well as dementia. The pt's daughter was present to provide assist w/ her subjective history as well as physically assist w/ all position changes need for positional testing and BPPV treatment. The pt's exam is consistent w/ a positive R elizabeth-hallpike. The pt was unable to accurately follow commands to complete VOR testing conclusively. The pt responded well to the R eply maneuver today, however due to level of effort required, risk of increased pain and assist required re-testing not completed after treatment today. Pt had no residual dizziness and required cues to follow post-treatment precautions. Pt will benefit from skilled PT to discontinue vertigo and improve independence w/ bed mobility.        Functional Impairments:     [x] BPPV    [x] Right [x] Posteror Canal [x] Canalithiasis    [] Left  [] Horizontal Canal [] Cupulolithiasis   []Decreased Gaze Stabilization   []Increased Motion Sensitivity   []Unilateral Vestibular Hypofunction   []Bilateral Vestibular Hypofunction   [x]Gait Instability   []Decreased tolerance for ADLs       []Decreased functional strength    [x]Reduced Balance/Proprioceptive control     []Reduced ability to hear/focus    []Noted cervical/thoracic/GHJ joint hypomobility    []Noted cervical/thoracic/GHJ joint hypermobility    []Decreased cervical/UE functional ROM    []Noted Headache pain aggravated by neck movements with/without dizziness    []Abnormal reflexes/sensation/myotomal/dermatomal deficits    []Decreased DCF control or ability to hold head up    []Decreased RC/scapular/core strength and neuromuscular control    []Other:     Functional Activity Limitations (from functional questionnaire and intake)   []Reduced ability to tolerate prolonged functional positions   [x]Reduced ability or difficulty with changes of positions or transfers between positions [x]Reduced ability to transfer in/out of bed or rolling in bed    []Reduced ability or tolerance with driving, reading and/or computer work    []Reduced ability to perform lifting, reaching, carrying tasks    []Reduced ability to forward bend   [x]Reduced ability to ambulate prolonged functional periods/distances/surfaces    [x]Reduced ability to ascend/descend stairs    []Reduced ability to concentrate/focus    [x]Reduced ability to turn/pitch head rapidly    [x]Reduced ability to self-correct for losses of balance   []Other:        Participation Restrictions    [x]Reduced participation in self care activities    [x]Reduced participation in home management activities    [x]Reduced participation in work activities    [x]Reduced participation in social activities    [x]Reduced participation in sport/recreational activities    Classification :    [x]Signs/symptoms consistent with BPPV (benign paroxysmal positional vertigo)       []Signs/symptoms consistent with unilateral peripheral vestibulopathy (i.e., vestibular neuritis, labyrinthitis, acoustic neuroma)     []Signs/symptoms consistent with central vestibulopathy    []Signs/symptoms consistent with migraine-related vestibulopathy    []Signs/symptoms consistent with Menieres disease / post-traumatic hydrops    []Signs/symptoms consistent with perilymphatic fistula    []Signs/symptoms consistent with cervicogenic dizziness    []Signs/symptoms consistent with gait instability    []Signs/symptoms consistent with motion sensitivity      []Signs/symptoms consistent with neck pain with mobility deficits      []Signs/symptoms consistent with neck pain with movement coordinated impairments     []Signs/symptoms consistent with neck pain with radiating pain     []Signs/symptoms consistent with neck pain with headaches (cervicogenic)     []Signs/symptoms consistent with nerve root involvement including myotome & dermatome dysfunction    []Signs/symptoms consistent with facet dysfunction of cervical and thoracic spine     []Signs/symptoms consistent suggesting central cord compression/UMN syndromes    []Signs/symptoms consistent with discogenic cervical pain    []Signs/symptoms consistent with rib dysfunction    []Signs/symptoms consistent with postural dysfunction    []Signs/symptoms consistent with shoulder pathology     []Signs/symptoms consistent with post-surgical status including decreased ROM, strength and function. []Signs/symptoms which may limit the use of advanced manual therapy techniques: (Elevated CV risk profile, recent trauma, intolerance to end range positions, prior TIA, visual issues, UE neurological compromise)    []Other:      Prognosis/Rehab Potential:      []Excellent    [x]Good    []Fair    []Poor    Tolerance of evaluation/treatment:     []Excellent    [x]Good    []Fair    []Poor     Physical Therapy Evaluation Complexity Justification   [x] A history of present problem with:  [] no personal factors and/or comorbidities that impact the plan of care;  []1-2 personal factors and/or comorbidities that impact the plan of care  [x]3 personal factors and/or comorbidities that impact the plan of care  [x] An examination of body systems using standardized tests and measures addressing any of the following: body structures and functions (impairments), activity limitations, and/or participation restrictions;:  [] a total of 1-2 or more elements   [] a total of 3 or more elements   [x] a total of 4 or more elements   [x] A clinical presentation with:  [] stable and/or uncomplicated characteristics   [x] evolving clinical presentation with changing characteristics  [] unstable and unpredictable characteristics;   [x] Clinical decision making of moderate complexity using standardized patient assessment instrument and/or measurable assessment of functional outcome.      []EVAL (LOW) 65147 (typically 20 minutes face-to-face)   [x]EVAL (MOD) 63288 (typically 30 minutes face-to-face)   []EVAL (HIGH) 69975 (typically 45 minutes face-to-face)   []RE-EVAL    PLAN:   Today's Treatment:    [] See flowsheet   [x] Patient treated with canalith repositioning maneuver- R eply w/ 2 person assist   [x] Education materials provided on BPPV/Vestibular Dysfunction   [x] Precautions provided and patient to follow precautions for next 24 hours in regards to BPPV management   [x] Written home exercise instructions   [] Other:    Frequency/Duration:  2 days per week for 2 Weeks:  Interventions:   []Therapeutic exercise including: strength training and ROM for Upper extremity, Lower extremity, spine, and Core    [x]NMR activation and proprioception for BLEs, vestibular training, balance, and coordination    []Manual therapy as indicated for UE, LE and spine to include: Dry Needling/IASTM, STM, PROM, Gr I-IV mobilizations, manipulation. [x]Vestibular rehabilitation to include canalith repositioning maneuvers, gaze stabilization, and habituation as indicated   []Gait training   []WC training   []Home Management training of patient and/or caregiver   []Modalities as needed that may include: thermal agents, E-stim, Biofeedback, US, iontophoresis as indicated   [x]Patient education on BPPV/vestibular function, balance, postural re-education, activity modification, progression of HEP. []Other:     GOALS: Goals established 3/31/22   Patient stated goal: decrease vertigo  [] Progressing: [] Met: [] Not Met: [] Adjusted    Therapist goals for Patient:  Short Term Goals= Long Term goals: To be achieved in: 2 weeks  1. Independent in HEP and progression per patient tolerance. [] Progressing: [] Met: [] Not Met: [] Adjusted  2. Disability index score of 18% or less for the [address] [place] [address] to assist with reaching prior level of function  [] Progressing: [] Met: [] Not Met: [] Adjusted  3. Patient will return to functional activities including bed mobility without increased symptoms or restriction.   [] Progressing: [] Met: [] Not Met: [] Adjusted  4. Pt will report no episodes of vertigo for 1 week.    [] Progressing: [] Met: [] Not Met: [] Adjusted    Electronically signed by:  Stef Valencia PT, DPT

## 2022-03-31 NOTE — FLOWSHEET NOTE
[facility], [facility]  [facility]  [place], [address] [place] [address]  Phone: [phone]   Fax: [phone]    Physical Therapy Treatment Note/ Progress Report:     Date:  3/31/2022    Patient Name:  Luda Olmedo    :  [age]  MRN: [id number]    Medical/Treatment Diagnosis Information:  · Diagnosis: H81.1 BPPV Right  ·    Treatment diagnosis: H81. 1BPPV R ( Vertigo w/ sit to supine due to R BPPV  Insurance/Certification information:  PT Insurance Information: Medicare  Physician Information:  Referring Practitioner: Dr. Man Lot of care signed:    [] Yes  [x] No    Date of Patient follow up with Physician:      Progress Report: []  Yes  [x]  No     Date Range for reporting period:  Beginning:  3/31/22  Ending:      Progress report due (10 Rx/or 30 days whichever is less): 3/26/14    Recertification due (POC duration/ or 90 days whichever is less):      Visit # Insurance Allowable Auth Needed   1/4 BMN []Yes   [x]No     RESTRICTIONS/PRECAUTIONS: dementia, low back pain, impaired balance  Latex Allergy:  [x]NO      []YES  Preferred Language for Healthcare:   [x]English       []other:  Functional Scale: DHI 38% impaired Date assessed:3/31/22    Pain level:  0/10     SUBJECTIVE:  See eval    OBJECTIVE: See eval   Observation: Pt amb w/ rollator, , narrow ISAAC, significant forward flexed posture and poor proximity to AD.  Test measurements:    R Hallpike-Deep maneuver: (performed over 3 pillows w/ assist of 2 persons due to back pain)              Nystagmus:     [x]? Yes             []? No               []? Duration:    30 sec                                      [x]? Direction:    R rotary (limited by pt maintaining eyes closed initially)              Vertigo:            [x]?  Yes             []? No               []? Duration: 30sec  L Hallpike-Deep maneuver: NT due to pt's intolerance of position changes and limited direction following      Exercises/Interventions: Exercises in bold performed in department today. Items not bolded are carried forward from prior visits for continuity of the record. Exercise/Equipment Resistance/Repetitions HEP Other comments   CRT R eply (over 3 pillows) [] 2 person assist for all position changes following 1 step commands     []      []      []      []      []      []      []      []      []      []      []      []      []      []      []      []      []    TA: Pt and daughter Educ on BPPV diagnosis, related anatomy, treatment and post-treatment precautions. Pt verbalized understanding. Pt required cues from daughter due to freq head nodding and looking down while ambulating. Home Exercise Program:      Therapeutic Exercise:   [] (67926) Provided verbal/tactile cueing for activities related to strengthening, flexibility, endurance, ROM for improvements in LE, proximal hip, and core control with self-care, mobility, lifting, ambulation. NMR:  [x] (67684) Provided verbal/tactile cueing for activities related to improving balance, coordination, kinesthetic sense, posture, motor skill, proprioception to assist with LE, proximal hip, and core control in self-care, mobility, lifting, ambulation and eccentric single leg control. Therapeutic Activities:    [x] (40031) Provided verbal/tactile cueing for activities related to improving balance, coordination, kinesthetic sense, posture, motor skill, proprioception and motor activation to allow for proper function of core, proximal hip and LE with self-care and ADLs and functional mobility.      Gait Training:    [] (62728) Provided training and instruction to the patient for proper LE, core and proximal hip recruitment and positioning and eccentric body weight control with ambulation re-education including up and down stairs     Manual Treatments:  PROM / STM / Oscillations-Mobs:  G-I, II, III, IV (PA's, Inf., Post.)  [] (15539) Provided manual therapy to mobilize LE, proximal hip and/or LS spine soft tissue/joints for the purpose of modulating pain, promoting relaxation,  increasing ROM, reducing/eliminating soft tissue swelling/inflammation/restriction, improving soft tissue extensibility and allowing for proper ROM for normal function with self-care, mobility, lifting and ambulation. Home Exercise Program:    [] (98221) Reviewed/Progressed HEP activities related to strengthening, flexibility, endurance, ROM of core, proximal hip and LE for functional self-care, mobility, lifting and ambulation/stair navigation   [] (54532) Reviewed/Progressed HEP activities related to improving balance, coordination, kinesthetic sense, posture, motor skill, proprioception of core, proximal hip and LE for self-care, mobility, lifting, and ambulation/stair navigation      Modalities:    [] Electric Stimulation:   [] Ultrasound:   [] Other:       Charges:  Timed Code Treatment Minutes: TA 14; NM8   Total Treatment Minutes: 40      [] EVAL (LOW) 41746 (typically 20 minutes face-to-face)  [x] EVAL (MOD) 52753 (typically 30 minutes face-to-face)  [] EVAL (HIGH) 22491 (typically 45 minutes face-to-face)  [] RE-EVAL     [] AA(04578) x       [x] NMR (62984) x  1     [] Manual (25006) x       [x] TA (61945) x  1     [] Gait Training [phone] x       [] ES(attended) (34546)  [] ES (un) ([phone])   [] DRY NEEDLE 1 OR 2 MUSCLES  [] DRY NEEDLE 3+ MUSCLES  [] Mech Traction (42411)  [] Ultrasound (11108)  [x] Other: CRT x 1    GOALS: Goals established 3/31/22   Patient stated goal: decrease vertigo  [] Progressing: [] Met: [] Not Met: [] Adjusted    Therapist goals for Patient:  Short Term Goals= Long Term goals: To be achieved in: 2 weeks  1. Independent in HEP and progression per patient tolerance. [] Progressing: [] Met: [] Not Met: [] Adjusted  2. Disability index score of 18% or less for the [address] [place] [address] to assist with reaching prior level of function  [] Progressing: [] Met: [] Not Met: [] Adjusted  3.    Patient will return to functional activities including bed mobility without increased symptoms or restriction. [] Progressing: [] Met: [] Not Met: [] Adjusted  4. Pt will report no episodes of vertigo for 1 week. [] Progressing: [] Met: [] Not Met: [] Adjusted    ASSESSMENT:  See eval      Treatment/Activity Tolerance:  [x] Patient tolerated treatment well [] Patient limited by fatique  [] Patient limited by pain  [] Patient limited by other medical complications  [] Other:     Overall Progression Towards Functional goals/ Treatment Progress Update:  [] Patient is progressing as expected towards functional goals listed. [] Progression is slowed due to complexities/Impairments listed. [] Progression has been slowed due to co-morbidities. [x] Plan just implemented, too soon to assess goals progression <30days   [] Goals require adjustment due to lack of progress  [] Patient is not progressing as expected and requires additional follow up with physician  [] Other    Prognosis for POC: [x] Good [] Fair  [] Poor    Patient requires continued skilled intervention: [x] Yes  [] No        PLAN: 2x/week for 2 weeks  [] Continue per plan of care [] Alter current plan (see comments)  [x] Plan of care initiated [] Hold pending MD visit [] Discharge    Electronically signed by: Stef Valencia PT, DPT    Note: If patient does not return for scheduled/recommended follow up visits, this note will serve as a discharge from care along with the most recent update on progress.

## 2022-03-31 NOTE — CARE COORDINATION
Call to patient as follow up from ER   Assigned per analytics as pt is  eligible for ACM  No answer, LM on VM with this ACM name and number to please call.     Will continue outreach

## 2022-04-01 ENCOUNTER — CARE COORDINATION (OUTPATIENT)
Dept: CARE COORDINATION | Age: 82
End: 2022-04-01

## 2022-04-01 NOTE — TELEPHONE ENCOUNTER
I agree with the Care Coordinator's Plan of Care    Patient can try miralax otc 1 cap daily titrated for one soft BM per day.

## 2022-04-01 NOTE — CARE COORDINATION
Ambulatory Care Coordination Note  4/1/2022  CM Risk Score: 0  Charlson 10 Year Mortality Risk Score: 100%     ACC: Christen Rodriguez, RN    Summary Note: Call from [address] [facility]  They have a caregiver support program that [place] could enroll in and then she would be eligible for utilizing respite services, however agent w [employer] did not think that could happen by 4/11  She could possibly enroll for any future dates/ needs for respite, but 4/11 does not give enough time for use this time    [facility] AT [facility] agencies/ respite care info emailed to patient daughter. .             Care Coordination Interventions    Program Enrollment: Complex Care  Referral from Primary Care Provider: No  Suggested Interventions and Community Resources  Fall Risk Prevention: Completed  Senior Services: In Process (Comment: 4/1/22- referral sent)  Other Therapy Services: Completed (Comment: OP Vestibular therapy)         Goals Addressed    None         Prior to Admission medications    Medication Sig Start Date End Date Taking? Authorizing Provider   metoprolol tartrate (LOPRESSOR) 25 MG tablet Take 0.5 tablets by mouth 2 times daily 3/1/22   Marlin Barker MD   atorvastatin (LIPITOR) 40 MG tablet TAKE 1 TABLET BY MOUTH DAILY 2/16/22   Coreen Gutiérrez DO   ketoconazole (NIZORAL) 2 % cream Apply topically daily.  12/15/21   Coreen Gutiérrez DO   mirtazapine (REMERON) 15 MG tablet TAKE 1 TABLET BY MOUTH EVERY DAY AT NIGHT 10/11/21   Coreen Gutiérrez DO   donepezil (ARICEPT) 5 MG tablet TAKE 1 TABLET BY MOUTH EVERY DAY AT NIGHT 10/11/21   Coreen Gutiérrez DO   aspirin 81 MG EC tablet Take 81 mg by mouth nightly     Historical Provider, MD       Future Appointments   Date Time Provider Tammie Reid   4/5/2022 12:00 PM Anjel Márquez PT [facility]   4/7/2022 12:00 PM Anjel Márquez PT [facility]   4/13/2022 11:00 AM DO STEPHEN Lentz [place] - [facility]   4/13/2022  1:00 PM Anjel Márquez PT [facility] PT [place] [facility] 4/18/2022  1:00 PM Karina Sanchez, PT [facility]   9/6/2022 12:30 PM Krista Reynolds MD [facility]

## 2022-04-01 NOTE — CARE COORDINATION
Ambulatory Care Coordination Note  4/1/2022  CM Risk Score: 0  Charlson 10 Year Mortality Risk Score: 100%     ACC: Lee Rubin, RN    Summary Note:   Referral from analytics after ER visit for AMS    Spoke w daughter, Jacinto Gan with whom pt lives along [address] ,  Reports doing OK    Per Jacinto Gan , pt had been given a dulcolax the day before as pt had not gone for a couple days  And she  had a large BM the following day  The following day ( the day of er visit) she had gone to bathroom and vomitted once  Jacinto Gan feels she was dehydrated  She did not fall per Eloisa Dopp would like PCP recommendation on a reg bowel regimen   Has used metamucil years ago  Currently not taking anything regularly  Also feels she becomes dehydrated easily in spite of Jacinto Gan trying to get her to drink more fluids/ water. Fall risk and safety at home discussed, no DME needs identified    Participating in Vestibular Therapy for vertigo. ENT referred also had impacted cerumen, see notes below from ENT   1. 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 It appears well-circumscribed. Patient is asymptomatic. Recommend ultrasound-guided fine-needle aspiration to ascertain whether this is malignant or not. Patient and daughter are leaning towards no surgical intervention given the patient's age. May follow with serial imaging. Will contact patient with biopsy results. - US FINE NEEDLE ASPIRATION; Future     2. BPPV (benign paroxysmal positional vertigo), right  Patient has symptoms and exam findings consistent with right BPPV. Will refer to vestibular therapy. - PT vestibular rehab; Future     3.  Impacted cerumen of left ear  Left side impacted cerumen removed with instruments today.        Return for biopsy results.       Jacinto Gan will be traveling for work beginning 4/11 and would like to arrange an aide from 8-8 daily while she is out of town to help  as they have dogs and work obligations too. PLan  emailing list of [place] [phone] cos for  care and facilities that offer respite services and community agency for help w locating respite  Bowel regimen per PCP  Cont outreach   [facility] referral completed  Consider visit from [place] [phone] for IV fluids on occasion-- [place] mentioned possibility? Ambulatory Care Coordination Assessment    Care Coordination Protocol  Program Enrollment: Complex Care  Referral from Primary Care Provider: No  Week 1 - Initial Assessment     Do you have all of your prescriptions and are they filled?: Yes (Comment: reviewed w daughter 4/1/22, )  Barriers to medication adherence: None  Are you able to afford your medications?: Yes  How often do you have trouble taking your medications the way you have been told to take them?: I always take them as prescribed.      Do you have Home O2 Therapy?: No      Ability to seek help/take action for Emergent Urgent situations i.e. fire, crime, inclement weather or health crisis.: Needs Assistance  Ability to ambulate to restroom: Needs Assistance  Ability handle personal hygeine needs (bathing/dressing/grooming): Needs Assistance  Ability to manage Medications: Needs Assistance  Ability to prepare Food Preparation: Dependent  Ability to maintain home (clean home, laundry): Dependent  Ability to drive and/or has transportation: Dependent  Ability to do shopping: Dependent  Ability to manage finances: Dependent  Is patient able to live independently?: No     Current Housing: Private Residence        Per the Fall Risk Screening, did the patient have 2 or more falls or 1 fall with injury in the past year?: No     Frequent urination at night?: No  Do you use rails/bars?: Yes  Do you have a non-slip tub mat?: Yes     Are you experiencing loss of meaning?: No  Are you experiencing loss of hope and peace?: No     Thinking about your patient's physical health needs, are there any symptoms or problems (risk indicators) you are unsure about that require further investigation?: No identified areas of uncertainly or problems already being investigated   Are the patients physical health problems impacting on their mental well-being?: No identified areas of concern   Are there any problems with your patients lifestyle behaviors (alcohol, drugs, diet, exercise) that are impacting on physical or mental well-being?: No identified areas of concern   Do you have any other concerns about your patients mental well-being? How would you rate their severity and impact on the patient?: No identified areas of concern   How would you rate their home environment in terms of safety and stability (including domestic violence, insecure housing, neighbor harassment)?: Consistently safe, supportive, stable, no identified problems   How do daily activities impact on the patient's well-being? (include current or anticipated unemployment, work, caregiving, access to transportation or other): No identified problems or perceived positive benefits   How would you rate their social network (family, work, friends)?: Good participation with social networks   How would you rate their financial resources (including ability to afford all required medical care)?: Financially secure, resources adequate, no identified problems   How wells does the patient now understand their health and well-being (symptoms, signs or risk factors) and what they need to do to manage their health?: Reasonable to good understanding and already engages in managing health or is willing to undertake better management   How well do you think your patient can engage in healthcare discussions?  (Barriers include language, deafness, aphasia, alcohol or drug problems, learning difficulties, concentration): Clear and open communication, no identified barriers   Do other services need to be involved to help this patient?: Other care/services not required at this time   Are current services involved with this patient well-coordinated? (Include coordination with other services you are now recommendation): All required care/services in place and well-coordinated   Suggested Interventions and Community Resources  Fall Risk Prevention: Completed Senior Services: In Process   Other Therapy Services: Completed         Set up/Review Goals, Set up/Review an Education Plan              Prior to Admission medications    Medication Sig Start Date End Date Taking? Authorizing Provider   metoprolol tartrate (LOPRESSOR) 25 MG tablet Take 0.5 tablets by mouth 2 times daily 3/1/22   Zach Tom MD   atorvastatin (LIPITOR) 40 MG tablet TAKE 1 TABLET BY MOUTH DAILY 2/16/22   Coreen Gutiérrez,    ketoconazole (NIZORAL) 2 % cream Apply topically daily.  12/15/21   Coreen Gutiérrez,    mirtazapine (REMERON) 15 MG tablet TAKE 1 TABLET BY MOUTH EVERY DAY AT NIGHT 10/11/21   Coreen Gutiérrez, DO   donepezil (ARICEPT) 5 MG tablet TAKE 1 TABLET BY MOUTH EVERY DAY AT NIGHT 10/11/21   Coreen Gutiérrez, DO   aspirin 81 MG EC tablet Take 81 mg by mouth nightly     Historical Provider, MD       Future Appointments   Date Time Provider Tammie Reid   4/5/2022 12:00 PM Robert Acosta PT [facility]   4/7/2022 12:00 PM Robert Acosta PT [facility]   4/13/2022 11:00 AM DO STEPHEN Lentz [facility] [place] - [facility]   4/13/2022  1:00 PM Robert Acosta PT [facility]   4/18/2022  1:00 PM Robert Acosta PT [facility]   9/6/2022 12:30 PM MD [facility]      and   General Assessment    Do you have any symptoms that are causing concern?: Yes  Progression since Onset: Unchanged  Reported Symptoms: Other

## 2022-04-05 ENCOUNTER — HOSPITAL ENCOUNTER (OUTPATIENT)
Dept: PHYSICAL THERAPY | Age: 82
Setting detail: THERAPIES SERIES
Discharge: HOME | End: 2022-04-05
Payer: MEDICARE

## 2022-04-05 PROCEDURE — 95992 CANALITH REPOSITIONING PROC: CPT

## 2022-04-05 NOTE — FLOWSHEET NOTE
[x]? Yes             []? No               []? Duration: 18 sec    Pt not re-tested after treatment due to pt and daughter's present. Exercises/Interventions: Exercises in bold performed in department today. Items not bolded are carried forward from prior visits for continuity of the record. Exercise/Equipment Resistance/Repetitions HEP Other comments   CRT R eply (over 3 pillows) [] 2 person assist for all position changes following 1 step commands. Pt w/ increased cervical guarding, more difficulty maintain proper head position.      []      []      []      []      []      []      []      []      []      []      []      []      []      []      []      []      []    TA: Pt and daughter reminded of precautions, however pt limited due looking down when walking and postural deficits causing significant forward head. Home Exercise Program:      Therapeutic Exercise:   [] (13414) Provided verbal/tactile cueing for activities related to strengthening, flexibility, endurance, ROM for improvements in LE, proximal hip, and core control with self-care, mobility, lifting, ambulation. NMR:  [] (49572) Provided verbal/tactile cueing for activities related to improving balance, coordination, kinesthetic sense, posture, motor skill, proprioception to assist with LE, proximal hip, and core control in self-care, mobility, lifting, ambulation and eccentric single leg control. Therapeutic Activities:    [] (86474) Provided verbal/tactile cueing for activities related to improving balance, coordination, kinesthetic sense, posture, motor skill, proprioception and motor activation to allow for proper function of core, proximal hip and LE with self-care and ADLs and functional mobility.      Gait Training:    [] (10355) Provided training and instruction to the patient for proper LE, core and proximal hip recruitment and positioning and eccentric body weight control with ambulation re-education including up and down stairs     Manual Treatments:  PROM / STM / Oscillations-Mobs:  G-I, II, III, IV (PA's, Inf., Post.)  [] (12989) Provided manual therapy to mobilize LE, proximal hip and/or LS spine soft tissue/joints for the purpose of modulating pain, promoting relaxation,  increasing ROM, reducing/eliminating soft tissue swelling/inflammation/restriction, improving soft tissue extensibility and allowing for proper ROM for normal function with self-care, mobility, lifting and ambulation. Home Exercise Program:    [] (03334) Reviewed/Progressed HEP activities related to strengthening, flexibility, endurance, ROM of core, proximal hip and LE for functional self-care, mobility, lifting and ambulation/stair navigation   [] (18368) Reviewed/Progressed HEP activities related to improving balance, coordination, kinesthetic sense, posture, motor skill, proprioception of core, proximal hip and LE for self-care, mobility, lifting, and ambulation/stair navigation      Modalities:    [] Electric Stimulation:   [] Ultrasound:   [] Other:       Charges:  Timed Code Treatment Minutes: CRT x 1   Total Treatment Minutes: 20      [] EVAL (LOW) 75815 (typically 20 minutes face-to-face)  [] EVAL (MOD) 49539 (typically 30 minutes face-to-face)  [] EVAL (HIGH) 95203 (typically 45 minutes face-to-face)  [] RE-EVAL     [] PP(57812) x       [] NMR (80389) x       [] Manual (84133) x       [] TA (48507) x  1     [] Gait Training [phone] x       [] ES(attended) (06148)  [] ES (un) (23281)   [] DRY NEEDLE 1 OR 2 MUSCLES  [] DRY NEEDLE 3+ MUSCLES  [] Mech Traction (21775)  [] Ultrasound (67574)  [x] Other: CRT x 1    GOALS: Goals established 3/31/22   Patient stated goal: decrease vertigo  [] Progressing: [] Met: [] Not Met: [] Adjusted    Therapist goals for Patient:  Short Term Goals= Long Term goals: To be achieved in: 2 weeks  1. Independent in HEP and progression per patient tolerance. [] Progressing: [] Met: [] Not Met: [] Adjusted  2. Disability index score of 18% or less for the [address] [place] [address] to assist with reaching prior level of function  [] Progressing: [] Met: [] Not Met: [] Adjusted  3. Patient will return to functional activities including bed mobility without increased symptoms or restriction. [] Progressing: [] Met: [] Not Met: [] Adjusted  4. Pt will report no episodes of vertigo for 1 week. [] Progressing: [] Met: [] Not Met: [] Adjusted    ASSESSMENT: Pt presents w/ improvement in vertigo symptoms, however return of vertigo yesterday and this morning. Re-tested elizabeth-hallpike w/ positive, but nystagmus for shorter duration. Pt tolerated R eply w/ assist of 2 persons due to mobility and cognitive limitatons. No residual symptoms, but pt and daughter prefer to not re-test after session. Will monitor at home and return next week as needed. Pt will continue to benefit from skilled PT to reduce fall risk and improve independence w/ functional mobility. Treatment/Activity Tolerance:  [x] Patient tolerated treatment well [] Patient limited by fatique  [] Patient limited by pain  [] Patient limited by other medical complications  [] Other:     Overall Progression Towards Functional goals/ Treatment Progress Update:  [] Patient is progressing as expected towards functional goals listed. [] Progression is slowed due to complexities/Impairments listed. [] Progression has been slowed due to co-morbidities.   [x] Plan just implemented, too soon to assess goals progression <30days   [] Goals require adjustment due to lack of progress  [] Patient is not progressing as expected and requires additional follow up with physician  [] Other    Prognosis for POC: [x] Good [] Fair  [] Poor    Patient requires continued skilled intervention: [x] Yes  [] No        PLAN: 2x/week for 2 weeks  [x] Continue per plan of care [] Alter current plan (see comments)  [] Plan of care initiated [] Hold pending MD visit [] Discharge    Electronically signed by: Felipe Bishop, PT, DPT    Note: If patient does not return for scheduled/recommended follow up visits, this note will serve as a discharge from care along with the most recent update on progress.

## 2022-04-06 NOTE — CARE COORDINATION
[facility] Outpatient Therapy  [address]  [facility] [place] [address], [place] [address], [address]  Phone: [phone]   Fax: [phone]    Physical Therapy Missed Visit Note     Date:  2022    Patient Name:  Jerome Head      :  1940    MRN: [id number]      Cancelled visits to date: 1 -22  No-shows to date: 0    For today's appointment patient:  [x]  Cancelled  []  Rescheduled appointment  []  No-show     Reason given by patient:  []  Patient ill  []  Conflicting appointment  [x]  No transportation- pt's daughter will be out of town    []  Conflict with work  []  No reason given  []  Other:     Comments:      Electronically signed by:  Robert Acosta, PT, DPT

## 2022-04-07 ENCOUNTER — CARE COORDINATION (OUTPATIENT)
Dept: CARE COORDINATION | Age: 82
End: 2022-04-07

## 2022-04-07 ENCOUNTER — HOSPITAL ENCOUNTER (OUTPATIENT)
Dept: PHYSICAL THERAPY | Age: 82
Setting detail: THERAPIES SERIES
Discharge: HOME | End: 2022-04-07
Payer: MEDICARE

## 2022-04-07 NOTE — CARE COORDINATION
Ambulatory Care Coordination Note  4/7/2022  CM Risk Score: 0  Charlson 10 Year Mortality Risk Score: 100%     ACC: Jose Moctezuma RN    Summary Note: Call to patient daughter, YAMILA on VM with this ACM name and number    ? Respite care or Ofeliamanjinder Mackey arranged for daughter travel time 4/11 leaves. PLan   will cont outreach        Care Coordination Interventions    Program Enrollment: Complex Care  Referral from Primary Care Provider: No  Suggested Interventions and Community Resources  Fall Risk Prevention: Completed  Senior Services: In Process (Comment: 4/1/22- referral sent)  Other Therapy Services: Completed (Comment: OP Vestibular therapy)         Goals Addressed    None         Prior to Admission medications    Medication Sig Start Date End Date Taking? Authorizing Provider   metoprolol tartrate (LOPRESSOR) 25 MG tablet Take 0.5 tablets by mouth 2 times daily 3/1/22   Yadira Sahu MD   atorvastatin (LIPITOR) 40 MG tablet TAKE 1 TABLET BY MOUTH DAILY 2/16/22   Coreen Gutiérrez,    ketoconazole (NIZORAL) 2 % cream Apply topically daily.  12/15/21   Coreen Gutiérrez DO   mirtazapine (REMERON) 15 MG tablet TAKE 1 TABLET BY MOUTH EVERY DAY AT NIGHT 10/11/21   Coreen Gutiérrez DO   donepezil (ARICEPT) 5 MG tablet TAKE 1 TABLET BY MOUTH EVERY DAY AT NIGHT 10/11/21   Coreen Gutiérrez DO   aspirin 81 MG EC tablet Take 81 mg by mouth nightly     Historical Provider, MD       Future Appointments   Date Time Provider Tammie Reid   4/13/2022 11:00 AM DO STEPHEN Lentz [place] - DYD   4/14/2022  2:00 PM Felipa Zacarias PT [facility] OP PT [facility]   4/18/2022  1:00 PM Felipa Zacarias PT EDILMAHZ OP PT [facility]   9/6/2022 12:30 PM Yadira Sahu MD [place] [facility]

## 2022-04-14 ENCOUNTER — APPOINTMENT (OUTPATIENT)
Dept: PHYSICAL THERAPY | Age: 82
End: 2022-04-14
Payer: MEDICARE

## 2022-04-14 ENCOUNTER — HOSPITAL ENCOUNTER (OUTPATIENT)
Dept: PHYSICAL THERAPY | Age: 82
Setting detail: THERAPIES SERIES
Discharge: HOME | End: 2022-04-14
Payer: MEDICARE

## 2022-04-14 NOTE — CARE COORDINATION
[facility] Outpatient Therapy  [address] [facility] [place] [address], [address] [place] [address]  Phone: [phone]   Fax: [phone]    Physical Therapy Missed Visit Note     Date:  2022    Patient Name:  Sindi Hoffman      :  1940    MRN: [id number]      Cancelled visits to date: 2 -22, 22  No-shows to date: 0    For today's appointment patient:  [x]  Cancelled  []  Rescheduled appointment  []  No-show     Reason given by patient:  []  Patient ill  []  Conflicting appointment  []  No transportation   []  Conflict with work  []  No reason given  [x]  Other:     Comments:  Pt's vertigo resolved, no need to return to PT at this time. Last note from 22 will serve as D/C note.      Electronically signed by:  Felipa Zacarias, PT, DPT

## 2022-04-18 ENCOUNTER — APPOINTMENT (OUTPATIENT)
Dept: PHYSICAL THERAPY | Age: 82
End: 2022-04-18
Payer: MEDICARE

## 2022-04-21 ENCOUNTER — CARE COORDINATION (OUTPATIENT)
Dept: CARE COORDINATION | Age: 82
End: 2022-04-21

## 2022-04-29 DIAGNOSIS — F03.90 DEMENTIA WITHOUT BEHAVIORAL DISTURBANCE, UNSPECIFIED DEMENTIA TYPE: ICD-10-CM

## 2022-04-29 SDOH — HEALTH STABILITY: PHYSICAL HEALTH: ON AVERAGE, HOW MANY MINUTES DO YOU ENGAGE IN EXERCISE AT THIS LEVEL?: 0 MIN

## 2022-04-29 SDOH — HEALTH STABILITY: PHYSICAL HEALTH: ON AVERAGE, HOW MANY DAYS PER WEEK DO YOU ENGAGE IN MODERATE TO STRENUOUS EXERCISE (LIKE A BRISK WALK)?: 0 DAYS

## 2022-04-29 ASSESSMENT — PATIENT HEALTH QUESTIONNAIRE - PHQ9
1. LITTLE INTEREST OR PLEASURE IN DOING THINGS: 0
SUM OF ALL RESPONSES TO PHQ QUESTIONS 1-9: 0
SUM OF ALL RESPONSES TO PHQ9 QUESTIONS 1 & 2: 0
SUM OF ALL RESPONSES TO PHQ QUESTIONS 1-9: 0
2. FEELING DOWN, DEPRESSED OR HOPELESS: 0
SUM OF ALL RESPONSES TO PHQ QUESTIONS 1-9: 0
SUM OF ALL RESPONSES TO PHQ QUESTIONS 1-9: 0

## 2022-04-29 ASSESSMENT — LIFESTYLE VARIABLES
HOW OFTEN DO YOU HAVE A DRINK CONTAINING ALCOHOL: 1
HOW OFTEN DO YOU HAVE SIX OR MORE DRINKS ON ONE OCCASION: 1
HOW MANY STANDARD DRINKS CONTAINING ALCOHOL DO YOU HAVE ON A TYPICAL DAY: 1
HOW OFTEN DO YOU HAVE A DRINK CONTAINING ALCOHOL: NEVER
HOW MANY STANDARD DRINKS CONTAINING ALCOHOL DO YOU HAVE ON A TYPICAL DAY: 1 OR 2

## 2022-04-29 NOTE — TELEPHONE ENCOUNTER
Last appointment: 3/10/2022  Next appointment: 5/2/2022  Last refill: 10/11/21  Please advise as DOD.

## 2022-05-01 RX ORDER — MIRTAZAPINE 15 MG/1
TABLET, FILM COATED ORAL
Qty: 90 TABLET | Refills: 0 | Status: SHIPPED | OUTPATIENT
Start: 2022-05-01 | End: 2022-07-27

## 2022-05-01 RX ORDER — DONEPEZIL HYDROCHLORIDE 5 MG/1
TABLET, FILM COATED ORAL
Qty: 90 TABLET | Refills: 0 | Status: SHIPPED | OUTPATIENT
Start: 2022-05-01 | End: 2022-07-27

## 2022-05-02 ENCOUNTER — OFFICE VISIT (OUTPATIENT)
Dept: INTERNAL MEDICINE CLINIC | Age: 82
End: 2022-05-02
Payer: MEDICARE

## 2022-05-02 VITALS
HEART RATE: 78 BPM | HEIGHT: 66 IN | SYSTOLIC BLOOD PRESSURE: 118 MMHG | OXYGEN SATURATION: 99 % | RESPIRATION RATE: 16 BRPM | WEIGHT: 163.4 LBS | DIASTOLIC BLOOD PRESSURE: 76 MMHG | BODY MASS INDEX: 26.26 KG/M2

## 2022-05-02 DIAGNOSIS — I10 ESSENTIAL HYPERTENSION: ICD-10-CM

## 2022-05-02 DIAGNOSIS — Z00.00 INITIAL MEDICARE ANNUAL WELLNESS VISIT: Primary | ICD-10-CM

## 2022-05-02 DIAGNOSIS — R73.03 PREDIABETES: ICD-10-CM

## 2022-05-02 DIAGNOSIS — N18.31 STAGE 3A CHRONIC KIDNEY DISEASE (HCC): ICD-10-CM

## 2022-05-02 DIAGNOSIS — F03.90 DEMENTIA WITHOUT BEHAVIORAL DISTURBANCE, UNSPECIFIED DEMENTIA TYPE: ICD-10-CM

## 2022-05-02 PROBLEM — N18.30 CHRONIC RENAL DISEASE, STAGE III (HCC): Status: ACTIVE | Noted: 2022-01-01

## 2022-05-02 PROCEDURE — G0438 PPPS, INITIAL VISIT: HCPCS | Performed by: INTERNAL MEDICINE

## 2022-05-02 PROCEDURE — 1123F ACP DISCUSS/DSCN MKR DOCD: CPT | Performed by: INTERNAL MEDICINE

## 2022-05-02 PROCEDURE — 4040F PNEUMOC VAC/ADMIN/RCVD: CPT | Performed by: INTERNAL MEDICINE

## 2022-05-02 NOTE — PATIENT INSTRUCTIONS
Please get covid booster, you can then get the pneumonia and Tdap vaccine together after. Personalized Preventive Plan for Jerome Head - 5/2/2022  Medicare offers a range of preventive health benefits. Some of the tests and screenings are paid in full while other may be subject to a deductible, co-insurance, and/or copay. Some of these benefits include a comprehensive review of your medical history including lifestyle, illnesses that may run in your family, and various assessments and screenings as appropriate. After reviewing your medical record and screening and assessments performed today your provider may have ordered immunizations, labs, imaging, and/or referrals for you. A list of these orders (if applicable) as well as your Preventive Care list are included within your After Visit Summary for your review. Other Preventive Recommendations:    · A preventive eye exam performed by an eye specialist is recommended every 1-2 years to screen for glaucoma; cataracts, macular degeneration, and other eye disorders. · A preventive dental visit is recommended every 6 months. · Try to get at least 150 minutes of exercise per week or 10,000 steps per day on a pedometer . · Order or download the FREE \"Exercise & Physical Activity: Your Everyday Guide\" from The [employer]. Call 2-403.185.7856 or search [employer] online. · You need 1269-1414 mg of calcium and 3539-9486 IU of vitamin D per day. It is possible to meet your calcium requirement with diet alone, but a vitamin D supplement is usually necessary to meet this goal.  · When exposed to the sun, use a sunscreen that protects against both UVA and UVB radiation with an SPF of 30 or greater. Reapply every 2 to 3 hours or after sweating, drying off with a towel, or swimming. · Always wear a seat belt when traveling in a car. Always wear a helmet when riding a bicycle or motorcycle.

## 2022-05-02 NOTE — PROGRESS NOTES
Medicare Annual Wellness Visit  Shaniqua Kwon  5/2/2022    ASSESSMENT/PLAN   Initial Medicare annual wellness visit  Essential hypertension  -     Comprehensive Metabolic Panel; Future  Prediabetes  -     Hemoglobin A1C; Future  Dementia without behavioral disturbance, unspecified dementia type ([facility]  Stage 3a chronic kidney disease ([facility]      Recommendations for Preventive Services Due: see orders and patient instructions/AVS.  Recommended screening schedule for the next 5-10 years is provided to the patient in written form: see Patient Instructions/AVS.    Return for Medicare Annual Wellness Visit in 1 year. SUBJECTIVE   Subjective Shaniqua Kwon is here for Medicare AWV        Patient's complete Health Risk Assessment and screening values have been reviewed and are found in Flowsheets. The following risks were reviewed today and where indicated follow up appointments were made and/or referrals ordered. Positive Risk Factor Screenings with Interventions:    Fall Risk:  Do you feel unsteady or are you worried about falling? : no  2 or more falls in past year?: no  Fall with injury in past year?: (!) yes     Fall Risk Interventions:    · patient already using home safety tips    Cognitive: Words recalled: 0 Words Recalled  Clock Drawing Test (CDT): (!) Abnormal  Total Score Interpretation: Abnormal Mini-Cog    Cognitive Impairment Interventions:  · known dx for some time. patient has alot of support with family, department of aging and nursing staff through our office.              General Health and ACP:  General  In general, how would you say your health is?: Good  In the past 7 days, have you experienced any of the following: New or Increased Pain, New or Increased Fatigue, Loneliness, Social Isolation, Stress or Anger?: No  Do you get the social and emotional support that you need?: Yes  Do you have a Living Will?: Yes    Advance Directives     Power of  Living Will ACP-Advance Directive ACP-Power of     Not on File Not on File Not on File Not on File      General Health Risk Interventions:  · patient has alot of support at this time from family and department of aging    Health Habits/Nutrition:     Physical Activity: Inactive    Days of Exercise per Week: 0 days    Minutes of Exercise per Session: 0 min     Have you lost any weight without trying in the past 3 months?: No  Body mass index: (!) 26.37  Have you seen the dentist within the past year?: Yes    Health Habits/Nutrition Interventions:  · family has been working to eat a healthier diet      ADLs:  In the past 7 days, did you need help from others to perform any of the following everyday activities: Eating, dressing, grooming, bathing, toileting, or walking/balance?: (!) Yes  Select all that apply: (!) Dressing,Bathing,Walking/Balance  In the past 7 days, did you need help from others to take care of any of the following: Laundry, housekeeping, banking/finances, shopping, telephone use, food preparation, transportation, or taking medications?: (!) Yes  Select all that apply: (!) Laundry,Housekeeping,Banking/Finances,Shopping,Food Preparation,Transportation,Taking Medications    ADL Interventions:  · Patient declines any further evaluation/treatment for this issue      OBJECTIVE     Wt Readings from Last 3 Encounters:   05/02/22 163 lb 6.4 oz (74.1 kg)   03/23/22 169 lb (76.7 kg)   03/10/22 169 lb (76.7 kg)     BP Readings from Last 3 Encounters:   05/02/22 118/76   03/29/22 (!) 133/58   03/23/22 129/79     Body mass index is 26.37 kg/m². Physical Exam  Constitutional:       General: She is not in acute distress. Appearance: Normal appearance. She is not ill-appearing. HENT:      Head: Normocephalic and atraumatic. Right Ear: External ear normal.      Left Ear: External ear normal.   Eyes:      Extraocular Movements: Extraocular movements intact.       Conjunctiva/sclera: Conjunctivae normal.   Cardiovascular:      Rate and Rhythm: Normal rate and regular rhythm. Heart sounds: No murmur heard. No friction rub. No gallop. Pulmonary:      Effort: Pulmonary effort is normal.      Breath sounds: Normal breath sounds. Abdominal:      General: Bowel sounds are normal. There is no distension. Palpations: Abdomen is soft. There is no mass. Tenderness: There is no abdominal tenderness. There is no guarding. Musculoskeletal:      Right lower leg: No edema. Left lower leg: No edema. Skin:     General: Skin is warm. Findings: No erythema or rash. Neurological:      General: No focal deficit present. Mental Status: She is alert and oriented to person, place, and time. Psychiatric:         Mood and Affect: Mood normal.         Behavior: Behavior normal.         Allergies   Allergen Reactions    Clarithromycin      CLARITHROMYCIN: headache - Converted from [employer]    Sulfamethoxazole-Trimethoprim      SULFAMETHOXAZOLE-TRIMETHOPRIM: headaches - Converted from [employer]     Prior to Visit Medications    Medication Sig Taking? Authorizing Provider   mirtazapine (REMERON) 15 MG tablet TAKE 1 TABLET BY MOUTH EVERY DAY AT NIGHT Yes Lolita Walter MD   donepezil (ARICEPT) 5 MG tablet TAKE 1 TABLET BY MOUTH EVERY DAY AT NIGHT Yes Lolita Walter MD   metoprolol tartrate (LOPRESSOR) 25 MG tablet Take 0.5 tablets by mouth 2 times daily Yes Ru Garcia MD   atorvastatin (LIPITOR) 40 MG tablet TAKE 1 TABLET BY MOUTH DAILY Yes Coreen Gutiérrez DO   ketoconazole (NIZORAL) 2 % cream Apply topically daily.  Yes Coreen Gutiérrez DO   aspirin 81 MG EC tablet Take 81 mg by mouth nightly  Yes Historical Provider, MD Babin (Including outside providers/suppliers regularly involved in providing care):   Patient Care Team:  Beto Johnson DO as PCP - General (Internal Medicine)  Beto Johnson DO as PCP - [facility] Empaneled Provider  Hong Green RN as Bonnie [address] [place] [address] Status  Health Maintenance   Topic Date Due    DTaP/Tdap/Td vaccine (1 - Tdap) Never done    DEXA (modify frequency per FRAX score)  Never done    Pneumococcal 65+ years Vaccine (1 - PCV) Never done    COVID-19 Vaccine (3 - Booster for Pfizer series) 09/15/2021    Lipids  06/09/2022    Flu vaccine (Season Ended) 09/01/2022    Depression Screen  04/29/2023    Potassium  05/02/2023    Creatinine  05/02/2023    Annual Wellness Visit (AWV)  05/03/2023    Shingles vaccine  Completed    Hepatitis A vaccine  Aged Out    Hepatitis B vaccine  Aged Out    Hib vaccine  Aged Out    Meningococcal (ACWY) vaccine  Aged [employer] History   Administered Date(s) Administered    COVID-19, Pfizer Purple top, DILUTE for use, 12+ yrs, 30mcg/0.3mL dose 03/25/2021, 04/15/2021    Zoster Live (Zostavax) 10/01/2016    Zoster Recombinant (Shingrix) 05/31/2011, 08/30/2011           Reviewed and updated this visit:  Tobacco  Allergies  Meds  Med Hx  Surg Hx  Soc Hx  Fam Hx

## 2022-05-17 ENCOUNTER — CARE COORDINATION (OUTPATIENT)
Dept: CARE COORDINATION | Age: 82
End: 2022-05-17

## 2022-05-18 ENCOUNTER — CARE COORDINATION (OUTPATIENT)
Dept: CARE COORDINATION | Age: 82
End: 2022-05-18

## 2022-05-18 NOTE — CARE COORDINATION
Ambulatory Care Coordination Note  5/18/2022  CM Risk Score: 5  Charlson 10 Year Mortality Risk Score: 100%     ACC: Millie Wadsworth RN    Summary Note: Call to patient daughter   reports moms dementia is progressing  Times when she can be mean  ADLs still intact- feeding, walking,    incontinent of urine    Daughter feels like an Adult day center would be beneficial.  Local center closed during covid, so unsure where one is , close to their [place] in [place]. Discussed [facility] re 24/7 support for problem solving and speaking w  re resources and disease progression. Call to [facility],  with Dante Obando at [phone] re poss Adult Day Centers/ make referral.      PLan   will cont outreach  Follow up w [facility]   Adult day Centers  ACP docs   consider ST at home? Care Coordination Interventions    Program Enrollment: Complex Care  Referral from Primary Care Provider: No  Suggested Interventions and Community Resources  Adult Day Program: In Process  Fall Risk Prevention: Completed  Disease Association: Completed (Comment: [facility] [phone] [facility] [place])  Senior Services: Completed (Comment: 4/1/22- referral sent)  Other Therapy Services: Completed (Comment: OP Vestibular therapy)         Goals Addressed    None         Prior to Admission medications    Medication Sig Start Date End Date Taking? Authorizing Provider   mirtazapine (REMERON) 15 MG tablet TAKE 1 TABLET BY MOUTH EVERY DAY AT NIGHT 5/1/22   Deni Ortiz MD   donepezil (ARICEPT) 5 MG tablet TAKE 1 TABLET BY MOUTH EVERY DAY AT NIGHT 5/1/22   Deni Ortiz MD   metoprolol tartrate (LOPRESSOR) 25 MG tablet Take 0.5 tablets by mouth 2 times daily 3/1/22   Cheko Coombs MD   atorvastatin (LIPITOR) 40 MG tablet TAKE 1 TABLET BY MOUTH DAILY 2/16/22   Coreen Gutiérrez, DO   ketoconazole (NIZORAL) 2 % cream Apply topically daily.  12/15/21   Coreen Gutiérrez, DO   aspirin 81 MG EC tablet Take 81 mg by mouth nightly     Historical Provider, MD       Future Appointments   Date Time Provider Tammie Reid   8/15/2022  2:30 PM DO STEPHEN Malin PC [place] - DYD   9/6/2022 12:30 PM Nicolas Reynolds MD [facility]

## 2022-05-18 NOTE — CARE COORDINATION
Ambulatory Care Coordination Note  5/18/2022  CM Risk Score: 5  Charlson 10 Year Mortality Risk Score: 100%     ACC: Dahlia Zaidi, JORGE    Summary Note:   Return call from [address] the [address] [place] in [place] has reopened , [phone]    Nancys direct line is [phone]    Call to Josiah Mario, pt daughter provided above information along w encouragement to place pt on wait list if there is one as the turn around can be high as well to request a list of daily activities so she can peruse activities mom may be interested in     Plan   Will cont outreach   consider [facility] Coordination Interventions    Program Enrollment: Complex Care  Referral from Primary Care Provider: No  Suggested Interventions and Community Resources  Adult Day Program: In Process  Fall Risk Prevention: Completed  Disease Association: Completed (Comment: [facility] [phone] [facility] [place])  Senior Services: Completed (Comment: 4/1/22- referral sent)  Other Therapy Services: Completed (Comment: OP Vestibular therapy)         Goals Addressed    None         Prior to Admission medications    Medication Sig Start Date End Date Taking? Authorizing Provider   mirtazapine (REMERON) 15 MG tablet TAKE 1 TABLET BY MOUTH EVERY DAY AT NIGHT 5/1/22   Tonia Abdi MD   donepezil (ARICEPT) 5 MG tablet TAKE 1 TABLET BY MOUTH EVERY DAY AT NIGHT 5/1/22   Tonia Abdi MD   metoprolol tartrate (LOPRESSOR) 25 MG tablet Take 0.5 tablets by mouth 2 times daily 3/1/22   Flores Cordero MD   atorvastatin (LIPITOR) 40 MG tablet TAKE 1 TABLET BY MOUTH DAILY 2/16/22   Coreen Gutiérrez DO   ketoconazole (NIZORAL) 2 % cream Apply topically daily.  12/15/21   Coreen Gutiérrez DO   aspirin 81 MG EC tablet Take 81 mg by mouth nightly     Historical Provider, MD       Future Appointments   Date Time Provider Tammie Reid   8/15/2022  2:30 [place] [address] [facility]   9/6/2022 12:30 PM Flores Cordero, [facility] MMA

## 2022-06-13 ENCOUNTER — TELEMEDICINE (OUTPATIENT)
Dept: INTERNAL MEDICINE CLINIC | Age: 82
End: 2022-06-13
Payer: MEDICARE

## 2022-06-13 DIAGNOSIS — J40 BRONCHITIS: Primary | ICD-10-CM

## 2022-06-13 DIAGNOSIS — N18.30 STAGE 3 CHRONIC KIDNEY DISEASE, UNSPECIFIED WHETHER STAGE 3A OR 3B CKD (HCC): ICD-10-CM

## 2022-06-13 PROCEDURE — G8427 DOCREV CUR MEDS BY ELIG CLIN: HCPCS | Performed by: INTERNAL MEDICINE

## 2022-06-13 PROCEDURE — 99213 OFFICE O/P EST LOW 20 MIN: CPT | Performed by: INTERNAL MEDICINE

## 2022-06-13 PROCEDURE — G8400 PT W/DXA NO RESULTS DOC: HCPCS | Performed by: INTERNAL MEDICINE

## 2022-06-13 PROCEDURE — 1123F ACP DISCUSS/DSCN MKR DOCD: CPT | Performed by: INTERNAL MEDICINE

## 2022-06-13 PROCEDURE — 1090F PRES/ABSN URINE INCON ASSESS: CPT | Performed by: INTERNAL MEDICINE

## 2022-06-13 RX ORDER — AZITHROMYCIN 250 MG/1
250 TABLET, FILM COATED ORAL SEE ADMIN INSTRUCTIONS
Qty: 6 TABLET | Refills: 0 | Status: SHIPPED | OUTPATIENT
Start: 2022-06-13 | End: 2022-06-18

## 2022-06-13 RX ORDER — BENZONATATE 100 MG/1
100 CAPSULE ORAL 2 TIMES DAILY PRN
Qty: 20 CAPSULE | Refills: 0 | Status: SHIPPED | OUTPATIENT
Start: 2022-06-13 | End: 2022-06-20

## 2022-06-13 NOTE — PROGRESS NOTES
Glory Felix (:  1940) is a Established patient, here for evaluation of the following:    Assessment & Plan   Below is the assessment and plan developed based on review of pertinent history, physical exam, labs, studies, and medications. 1. Bronchitis  Symptoms likely secondary to bronchitis. - Will treat with azithromycin (patient does have reported allergy of clarithromycin however daughter states the patient has tolerated Z-Marty recently.)  -Tessalon for cough  -Patient and family to notify me with any worsening or no improvement in symptoms on these medications  -     azithromycin (ZITHROMAX) 250 MG tablet; Take 1 tablet by mouth See Admin Instructions for 5 days 500mg on day 1 followed by 250mg on days 2 - 5, Disp-6 tablet, R-0Normal  -     benzonatate (TESSALON) 100 MG capsule; Take 1 capsule by mouth 2 times daily as needed for Cough, Disp-20 capsule, R-0Normal      No follow-ups on file. Subjective   HPI     Patient is a 60-year-old female with past medical history of PAD, hypertension, dementia who presents secondary to concerns about upper respiratory infection. Patient has been sneezing congested and coughing. Symptoms started this weekend. Everyone in house tested negative for covid -19, but have also been sick. Denies any shortness of breath with sitting. Maybe some with walking. No facial pain. Denies fevers or chills. A lot of productive cough. Review of Systems ROS negative except for those noted in the HPI above.           Objective   Patient-Reported Vitals  Patient-Reported Systolic (Top): 180 mmHg  Patient-Reported Diastolic (Bottom): 80 mmHg  Patient-Reported Temperature: No reported fever  Patient-Reported Weight: 173lb  Patient-Reported Height: 5ft 6in       Physical Exam  [INSTRUCTIONS:  \"[x]\" Indicates a positive item  \"[]\" Indicates a negative item  -- DELETE ALL ITEMS NOT EXAMINED]    Constitutional: [x] Appears well-developed and well-nourished [x] No apparent distress      [] Abnormal -     Mental status: [x] Alert and awake  [x] Oriented to person/place/time [x] Able to follow commands    [] Abnormal -     Eyes:   EOM    [x]  Normal    [] Abnormal -   Sclera  [x]  Normal    [] Abnormal -          Discharge [x]  None visible   [] Abnormal -     HENT: [x] Normocephalic, atraumatic  [] Abnormal -   [x] Mouth/Throat: Mucous membranes are moist    External Ears [x] Normal  [] Abnormal -    Neck: [x] No visualized mass [] Abnormal -     Pulmonary/Chest: [x] Respiratory effort normal   [x] No visualized signs of difficulty breathing or respiratory distress        [] Abnormal -      Musculoskeletal:   [] Normal gait with no signs of ataxia         [x] Normal range of motion of neck        [] Abnormal -     Neurological:        [x] No Facial Asymmetry (Cranial nerve 7 motor function) (limited exam due to video visit)          [x] No gaze palsy        [] Abnormal -          Skin:        [x] No significant exanthematous lesions or discoloration noted on facial skin         [] Abnormal -            Psychiatric:       [x] Normal Affect [] Abnormal -        [x] No Hallucinations    Other pertinent observable physical exam findings:-             Nolan Hunter, was evaluated through a synchronous (real-time) audio-video encounter. The patient (or guardian if applicable) is aware that this is a billable service, which includes applicable co-pays. This Virtual Visit was conducted with patient's (and/or legal guardian's) consent. The visit was conducted pursuant to the emergency declaration under the [address] [place] [address] and the [employer] and [employer] General Act. Patient identification was verified, and a caregiver was present when appropriate. The patient was located at Home: [place] [address]. Provider was located at [facility] (Appt Dept): [address] [facility],  [address] --Robina Bentleyo, DO

## 2022-06-17 ENCOUNTER — CARE COORDINATION (OUTPATIENT)
Dept: CARE COORDINATION | Age: 82
End: 2022-06-17

## 2022-06-20 NOTE — CARE COORDINATION
Ambulatory Care Coordination Note  6/20/2022  CM Risk Score: 5  Charlson 10 Year Mortality Risk Score: 100%     ACC: Raj Garcia, RN    Summary Note: Call to daughter Barbara Beckre   reports patient feeling much better, residual cough  But not bad     has been attending [address] wed/ Fri and has been enjoying the exercise/ interaction      PLan   pt daughter has this ACM name and number to call if needs arise   will dc from panel at this time    Lab Results     None          Care Coordination Interventions    Program Enrollment: Complex Care  Referral from Primary Care Provider: No  Suggested Interventions and Community Resources  Adult Day Program: In Process  Fall Risk Prevention: Completed  Disease Association: Completed (Comment: [facility] [phone] [facility] [place])  Senior Services: Completed (Comment: 4/1/22- referral sent)  Other Therapy Services: Completed (Comment: OP Vestibular therapy)         Goals Addressed    None         Prior to Admission medications    Medication Sig Start Date End Date Taking? Authorizing Provider   benzonatate (TESSALON) 100 MG capsule Take 1 capsule by mouth 2 times daily as needed for Cough 6/13/22 6/20/22  Coreen Gutiérrez DO   mirtazapine (REMERON) 15 MG tablet TAKE 1 TABLET BY MOUTH EVERY DAY AT NIGHT 5/1/22   Genesis Benson MD   donepezil (ARICEPT) 5 MG tablet TAKE 1 TABLET BY MOUTH EVERY DAY AT NIGHT 5/1/22   Genesis Benson MD   metoprolol tartrate (LOPRESSOR) 25 MG tablet Take 0.5 tablets by mouth 2 times daily 3/1/22   Alvarez Nance MD   atorvastatin (LIPITOR) 40 MG tablet TAKE 1 TABLET BY MOUTH DAILY 2/16/22   Coreen Gutiérrez DO   ketoconazole (NIZORAL) 2 % cream Apply topically daily.  12/15/21   Coreen Gutiérrez DO   aspirin 81 MG EC tablet Take 81 mg by mouth nightly     Historical Provider, MD       Future Appointments   Date Time Provider Tammie Reid   7/5/2022  1:00 PM [address]   8/15/2022  2:30 PM [employer], DO STEPHEN BERNAL [place] - DYD   9/6/2022 12:30 PM Markie Leija MD [facility]

## 2022-07-05 ENCOUNTER — HOSPITAL ENCOUNTER (OUTPATIENT)
Dept: ULTRASOUND IMAGING | Age: 82
Discharge: HOME | End: 2022-07-05
Payer: MEDICARE

## 2022-07-05 DIAGNOSIS — D49.0 PAROTID NEOPLASM: ICD-10-CM

## 2022-07-05 PROCEDURE — 76536 US EXAM OF HEAD AND NECK: CPT

## 2022-07-06 ENCOUNTER — TELEPHONE (OUTPATIENT)
Dept: ENT CLINIC | Age: 82
End: 2022-07-06

## 2022-07-06 NOTE — TELEPHONE ENCOUNTER
----- Message from Aftab Wray DO sent at 7/5/2022  2:43 PM EDT -----  No significant change in size of nodule. I see they elected not to perform biopsy. I would recommend following up in 6 months for monitoring.

## 2022-07-27 DIAGNOSIS — F03.90 DEMENTIA WITHOUT BEHAVIORAL DISTURBANCE, UNSPECIFIED DEMENTIA TYPE: ICD-10-CM

## 2022-07-27 RX ORDER — DONEPEZIL HYDROCHLORIDE 5 MG/1
TABLET, FILM COATED ORAL
Qty: 90 TABLET | Refills: 0 | Status: SHIPPED | OUTPATIENT
Start: 2022-07-27 | End: 2022-10-24

## 2022-07-27 RX ORDER — MIRTAZAPINE 15 MG/1
TABLET, FILM COATED ORAL
Qty: 90 TABLET | Refills: 0 | Status: SHIPPED | OUTPATIENT
Start: 2022-07-27 | End: 2022-10-24

## 2022-07-27 NOTE — TELEPHONE ENCOUNTER
Medication:   Requested Prescriptions     Pending Prescriptions Disp Refills    donepezil (ARICEPT) 5 MG tablet [Pharmacy Med Name: DONEPEZIL HCL 5 MG TABLET] 90 tablet 0     Sig: TAKE 1 TABLET BY MOUTH EVERY DAY AT NIGHT    mirtazapine (REMERON) 15 MG tablet [Pharmacy Med Name: MIRTAZAPINE 15 MG TABLET] 90 tablet 0     Sig: TAKE 1 TABLET BY MOUTH EVERY DAY AT NIGHT     Last Filled:  5/1/22    Last appt: 6/13/2022   Next appt: 8/15/2022

## 2022-08-11 RX ORDER — ATORVASTATIN CALCIUM 40 MG/1
40 TABLET, FILM COATED ORAL DAILY
Qty: 90 TABLET | Refills: 1 | Status: SHIPPED | OUTPATIENT
Start: 2022-08-11 | End: 2022-11-02

## 2022-08-29 ENCOUNTER — OFFICE VISIT (OUTPATIENT)
Dept: INTERNAL MEDICINE CLINIC | Age: 82
End: 2022-08-29
Payer: MEDICARE

## 2022-08-29 VITALS
SYSTOLIC BLOOD PRESSURE: 126 MMHG | OXYGEN SATURATION: 98 % | HEART RATE: 69 BPM | DIASTOLIC BLOOD PRESSURE: 80 MMHG | HEIGHT: 66 IN | BODY MASS INDEX: 26.03 KG/M2 | WEIGHT: 162 LBS

## 2022-08-29 DIAGNOSIS — E78.2 MIXED HYPERLIPIDEMIA: ICD-10-CM

## 2022-08-29 DIAGNOSIS — R32 URINARY INCONTINENCE, UNSPECIFIED TYPE: Primary | ICD-10-CM

## 2022-08-29 DIAGNOSIS — K11.9 LESION OF PAROTID GLAND: ICD-10-CM

## 2022-08-29 DIAGNOSIS — R44.3 HALLUCINATIONS: ICD-10-CM

## 2022-08-29 DIAGNOSIS — F03.90 DEMENTIA WITHOUT BEHAVIORAL DISTURBANCE, UNSPECIFIED DEMENTIA TYPE: ICD-10-CM

## 2022-08-29 DIAGNOSIS — Z91.81 AT HIGH RISK FOR FALLS: ICD-10-CM

## 2022-08-29 DIAGNOSIS — R73.03 PREDIABETES: ICD-10-CM

## 2022-08-29 DIAGNOSIS — I10 ESSENTIAL HYPERTENSION: ICD-10-CM

## 2022-08-29 DIAGNOSIS — N18.30 STAGE 3 CHRONIC KIDNEY DISEASE, UNSPECIFIED WHETHER STAGE 3A OR 3B CKD (HCC): ICD-10-CM

## 2022-08-29 DIAGNOSIS — N30.00 ACUTE CYSTITIS WITHOUT HEMATURIA: Primary | ICD-10-CM

## 2022-08-29 LAB
BACTERIA: ABNORMAL /HPF
BILIRUBIN URINE: NEGATIVE
BLOOD, URINE: ABNORMAL
CLARITY: ABNORMAL
COLOR: YELLOW
EPITHELIAL CELLS, UA: 8 /HPF (ref 0–5)
GLUCOSE URINE: NEGATIVE MG/DL
HYALINE CASTS: 0 /LPF (ref 0–8)
KETONES, URINE: NEGATIVE MG/DL
LEUKOCYTE ESTERASE, URINE: ABNORMAL
MICROSCOPIC EXAMINATION: YES
NITRITE, URINE: NEGATIVE
PH UA: 6 (ref 5–8)
PROTEIN UA: 30 MG/DL
RBC UA: 19 /HPF (ref 0–4)
SPECIFIC GRAVITY UA: 1.03 (ref 1–1.03)
URINE REFLEX TO CULTURE: YES
URINE TYPE: ABNORMAL
UROBILINOGEN, URINE: 0.2 E.U./DL
WBC UA: 70 /HPF (ref 0–5)

## 2022-08-29 PROCEDURE — 1090F PRES/ABSN URINE INCON ASSESS: CPT | Performed by: INTERNAL MEDICINE

## 2022-08-29 PROCEDURE — G8400 PT W/DXA NO RESULTS DOC: HCPCS | Performed by: INTERNAL MEDICINE

## 2022-08-29 PROCEDURE — G8417 CALC BMI ABV UP PARAM F/U: HCPCS | Performed by: INTERNAL MEDICINE

## 2022-08-29 PROCEDURE — G8427 DOCREV CUR MEDS BY ELIG CLIN: HCPCS | Performed by: INTERNAL MEDICINE

## 2022-08-29 PROCEDURE — 0509F URINE INCON PLAN DOCD: CPT | Performed by: INTERNAL MEDICINE

## 2022-08-29 PROCEDURE — 1036F TOBACCO NON-USER: CPT | Performed by: INTERNAL MEDICINE

## 2022-08-29 PROCEDURE — 99214 OFFICE O/P EST MOD 30 MIN: CPT | Performed by: INTERNAL MEDICINE

## 2022-08-29 PROCEDURE — 1123F ACP DISCUSS/DSCN MKR DOCD: CPT | Performed by: INTERNAL MEDICINE

## 2022-08-29 PROCEDURE — 81003 URINALYSIS AUTO W/O SCOPE: CPT | Performed by: INTERNAL MEDICINE

## 2022-08-29 RX ORDER — NITROFURANTOIN 25; 75 MG/1; MG/1
100 CAPSULE ORAL 2 TIMES DAILY
Qty: 10 CAPSULE | Refills: 0 | Status: SHIPPED | OUTPATIENT
Start: 2022-08-29 | End: 2022-09-03

## 2022-08-29 SDOH — ECONOMIC STABILITY: FOOD INSECURITY: WITHIN THE PAST 12 MONTHS, YOU WORRIED THAT YOUR FOOD WOULD RUN OUT BEFORE YOU GOT MONEY TO BUY MORE.: NEVER TRUE

## 2022-08-29 SDOH — ECONOMIC STABILITY: FOOD INSECURITY: WITHIN THE PAST 12 MONTHS, THE FOOD YOU BOUGHT JUST DIDN'T LAST AND YOU DIDN'T HAVE MONEY TO GET MORE.: NEVER TRUE

## 2022-08-29 ASSESSMENT — SOCIAL DETERMINANTS OF HEALTH (SDOH): HOW HARD IS IT FOR YOU TO PAY FOR THE VERY BASICS LIKE FOOD, HOUSING, MEDICAL CARE, AND HEATING?: NOT HARD AT ALL

## 2022-08-29 NOTE — PROGRESS NOTES
Flavio Alamo (:  1940) is a 81 y.o. female,Established patient, here for evaluation of the following chief complaint(s):  Follow-up      ASSESSMENT/PLAN:  1. Urinary incontinence, unspecified type  Patient with urinary incontinence in addition to worsening mental status and hallucinations. Patient's daughter concerned about UTI. Will obtain urinalysis with reflex to culture. Hold on antibiotics at this time. -     Urinalysis with Reflex to Culture  2. Hallucinations  Discussed hallucinations with daughter. Unclear etiology. Could be worsening dementia. Could also be related to infection if present. Will obtain CMP and CBC. Did discuss getting imaging of patient's head. Patient's daughter does not feel that this is necessary in order to keep her mom comfortable. I do agree that this is a reasonable course of action. -     Comprehensive Metabolic Panel; Future  -     CBC with Auto Differential; Future  3. Mixed hyperlipidemia  Patient due for lipid panel since we are already completing lab work will obtain today. -     Lipid Panel; Future  4. Stage 3 chronic kidney disease, unspecified whether stage 3a or 3b CKD ([address]  Check CMP today  5. Essential hypertension  Well-controlled with metoprolol continue this medication  6. Prediabetes  Check CMP  7. Dementia without behavioral disturbance, unspecified dementia type ([address]  Discussed patient's worsening dementia. They did have help coming into the home through the Department of aging however had to stop this for person from coming in. Waiting for department of aging to send someone else out. Discussed other options including palliative medicine and hospice. I do think that patient's dementia is progressing based on her decreased appetite and oral intake as well as weight loss and now hallucinations and worsening incontinence with bowel movements and locations that are not the restroom. We may discuss need for hospice in the near future. Patient's daughter is open to this option when necessary. They have also started to look into nursing homes for patient. 8. Lesion of parotid gland  Patient and family have opted to not biopsy this lesion. They understand the risks associated with this. This lesion is stable from imaging that was done most recently. We will plan to continue follow-up through ENT. Plans for continued imaging to follow size. 9. At high risk for falls  Information given to patient  Return in about 3 months (around 11/29/2022). SUBJECTIVE/OBJECTIVE:  HPI    Patient is an 21-year-old female with past medical history of hypertension, PAD, fib, CKD, dementia and lesion of the parotid gland who presents for follow-up for the above listed chronic diseases and worsening in her mental status in addition to hallucinations. Patient presents with her daughter. Daughter gives most of the history secondary to patient's dementia. Of note patient denies any current pain. She states that overall that she feels well. Patient's daughter is very concerned about patient's dementia. She feels that dementia is progressively worsening. She notes new hallucinations. She notes that her mom mentions a person out in the tree. She states that there is no person in the tree when she mentions this. She also notes worsening incontinence of both urine and stool. She notes patient having bowel movements in places where she should not be having them. Patient's daughter also lets me know that she has been seeing ENT for the lesion on the left parotid gland. They have opted not to biopsy it. She notes that overall they understand that this could be cancer and okay with this risk. She states that in general they would like to take a more standoffish stance to patient's medical care.   She notes that they would like to continue doing urine and lab work however when it comes to extensive testing they will opt out of it at this time secondary to patient's worsening dementia. She notes that they want to do this to keep patient comfortable and understand all associated risks. Daughter notes that she did discuss this with her mom as well when she was able to understand and was on board with this plan. Review of Systems ROS negative except for those noted in the HPI above. Vitals:    08/29/22 0950   BP: 126/80   Pulse: 69   SpO2: 98%       Physical Exam  Constitutional:       General: She is not in acute distress. Appearance: Normal appearance. She is not ill-appearing. HENT:      Head: Normocephalic and atraumatic. Right Ear: External ear normal.      Left Ear: External ear normal.   Eyes:      Extraocular Movements: Extraocular movements intact. Conjunctiva/sclera: Conjunctivae normal.   Cardiovascular:      Rate and Rhythm: Normal rate and regular rhythm. Heart sounds: No murmur heard. No friction rub. No gallop. Pulmonary:      Effort: Pulmonary effort is normal. No respiratory distress. Breath sounds: Normal breath sounds. No wheezing, rhonchi or rales. Abdominal:      General: Bowel sounds are normal. There is no distension. Palpations: Abdomen is soft. Tenderness: There is no abdominal tenderness. There is no guarding or rebound. Musculoskeletal:      Right lower leg: No edema. Left lower leg: No edema. Skin:     General: Skin is warm. Findings: No erythema or rash. Neurological:      General: No focal deficit present. Mental Status: She is alert and oriented to person, place, and time. Psychiatric:         Mood and Affect: Mood normal.         Behavior: Behavior normal.       An electronic signature was used to authenticate this note. --Alyn Curling, DO   On the basis of positive falls risk screening, assessment and plan is as follows: home safety tips provided.

## 2022-08-31 LAB
ORGANISM: ABNORMAL
URINE CULTURE, ROUTINE: ABNORMAL

## 2022-09-01 ENCOUNTER — TELEPHONE (OUTPATIENT)
Dept: INTERNAL MEDICINE CLINIC | Age: 82
End: 2022-09-01

## 2022-09-01 NOTE — TELEPHONE ENCOUNTER
Patient's daughter returning call regarding lab results. Relayed the following message per Dr. Ezekiel Burris:  \"Please let patients daughter know that antibiotic given should treat her UTI based on the culture results. They should call if symptoms do not improve. \"      Patient's daughter understood and has no questions at this time.

## 2022-09-02 ENCOUNTER — TELEPHONE (OUTPATIENT)
Dept: INTERNAL MEDICINE CLINIC | Age: 82
End: 2022-09-02

## 2022-09-02 NOTE — TELEPHONE ENCOUNTER
Patient's daughter is calling back for patient's lab results. Relayed the following message from Dr. Courtney Jain: \"Overall labs look pretty good. Cholesterol is well controlled. Kidney function is stable. She could be slightly dehydrated so please try to push her fluids. \"    Patient's daughter understood and has no additional questions.

## 2022-09-09 ENCOUNTER — TELEPHONE (OUTPATIENT)
Dept: INTERNAL MEDICINE CLINIC | Age: 82
End: 2022-09-09

## 2022-09-09 NOTE — TELEPHONE ENCOUNTER
Batsheva Diehl with [facility] is calling to request a \"current demographics sheet for patient. \" She states the referral did not contain enough information for patient.      Please contact with any questions at:  P: [phone] option 1    Please fax demographics sheet to:  F: [phone]

## 2022-09-20 ENCOUNTER — TELEPHONE (OUTPATIENT)
Dept: INTERNAL MEDICINE CLINIC | Age: 82
End: 2022-09-20

## 2022-09-20 NOTE — TELEPHONE ENCOUNTER
Was able to speak to pt's daughter and she stated that she only has SOB when moving around but she is eating, drinking and urinating fine     Pt's daughter asked for the 315 slot due to her mother not being able to get up in the morning

## 2022-09-20 NOTE — TELEPHONE ENCOUNTER
Thank you. Patients daughter also notified to report to ER if shortness of breath worsens before then.

## 2022-09-20 NOTE — TELEPHONE ENCOUNTER
Patient's daughter, Jose De Jesus Atkinson, states patient woke up with swollen ankles and feet. Jose De Jesus Atkinson states she noticed it when patient woke up at [address] today. She also states patient's feet and ankles were swollen several days ago but it went away and appears worse today. She states it is not red, \"just looks very puffy. \"    She stated she would like to hear back from Dr. Milla Magana before scheduling appointment as it is difficult to bring her mom in to the office. Advised her that Dr. Milla Magana is not in office today. She was worried about patient's kidney function. Per most recent labs \"kidney function is stable\". Jose De Jesus Atkinson is unsure if patient should come in to have labs checked again. Please contact Tati at number provided when possible to further discuss.

## 2022-09-21 ENCOUNTER — HOSPITAL ENCOUNTER (OUTPATIENT)
Age: 82
Discharge: HOME | End: 2022-09-21
Payer: MEDICARE

## 2022-09-21 ENCOUNTER — HOSPITAL ENCOUNTER (OUTPATIENT)
Dept: GENERAL RADIOLOGY | Age: 82
Discharge: HOME | End: 2022-09-21
Payer: MEDICARE

## 2022-09-21 ENCOUNTER — OFFICE VISIT (OUTPATIENT)
Dept: INTERNAL MEDICINE CLINIC | Age: 82
End: 2022-09-21
Payer: MEDICARE

## 2022-09-21 VITALS
BODY MASS INDEX: 26.18 KG/M2 | SYSTOLIC BLOOD PRESSURE: 128 MMHG | WEIGHT: 162.2 LBS | DIASTOLIC BLOOD PRESSURE: 76 MMHG | OXYGEN SATURATION: 97 % | HEART RATE: 68 BPM

## 2022-09-21 DIAGNOSIS — I10 ESSENTIAL HYPERTENSION: ICD-10-CM

## 2022-09-21 DIAGNOSIS — F03.90 DEMENTIA WITHOUT BEHAVIORAL DISTURBANCE, UNSPECIFIED DEMENTIA TYPE: ICD-10-CM

## 2022-09-21 DIAGNOSIS — R06.02 SHORTNESS OF BREATH: ICD-10-CM

## 2022-09-21 DIAGNOSIS — M79.89 LEG SWELLING: ICD-10-CM

## 2022-09-21 DIAGNOSIS — R06.02 SHORTNESS OF BREATH: Primary | ICD-10-CM

## 2022-09-21 PROCEDURE — 99214 OFFICE O/P EST MOD 30 MIN: CPT | Performed by: INTERNAL MEDICINE

## 2022-09-21 PROCEDURE — G8400 PT W/DXA NO RESULTS DOC: HCPCS | Performed by: INTERNAL MEDICINE

## 2022-09-21 PROCEDURE — 1090F PRES/ABSN URINE INCON ASSESS: CPT | Performed by: INTERNAL MEDICINE

## 2022-09-21 PROCEDURE — 1123F ACP DISCUSS/DSCN MKR DOCD: CPT | Performed by: INTERNAL MEDICINE

## 2022-09-21 PROCEDURE — 1036F TOBACCO NON-USER: CPT | Performed by: INTERNAL MEDICINE

## 2022-09-21 PROCEDURE — 71046 X-RAY EXAM CHEST 2 VIEWS: CPT

## 2022-09-21 PROCEDURE — G8427 DOCREV CUR MEDS BY ELIG CLIN: HCPCS | Performed by: INTERNAL MEDICINE

## 2022-09-21 PROCEDURE — G8417 CALC BMI ABV UP PARAM F/U: HCPCS | Performed by: INTERNAL MEDICINE

## 2022-09-21 RX ORDER — RISPERIDONE 0.25 MG/1
0.25 TABLET, FILM COATED ORAL 2 TIMES DAILY
COMMUNITY
End: 2022-11-02

## 2022-09-21 NOTE — PROGRESS NOTES
Juliana Campbell (:  1940) is a 81 y.o. female,Established patient, here for evaluation of the following chief complaint(s): Foot Swelling (B/L x 2-3 days ) and Dementia      Vitals:    22 1450   BP: 128/76   Pulse: 68   SpO2: 97%        ASSESSMENT/PLAN:  1. Shortness of breath  Shortness of breath on exertion such as getting dressed. Denies orthopnea. Lungs clear on exam.  Patient not in atrial fibrillation. Unclear etiology at this time. Obtain BMP, chest x-ray and echo in the setting of swelling in the lower extremities bilaterally that is also new and started at the same time. -     Basic Metabolic Panel; Future  -     ECHO 2D WO Color Doppler Complete; Future  -     CHEST XRAY  2. Leg swelling  See #1 above  -     Basic Metabolic Panel; Future  -     ECHO 2D WO Color Doppler Complete; Future  -      CHEST XRAY  3. Essential hypertension  Well-controlled on metoprolol continue this medication  4. Dementia without behavioral disturbance, unspecified dementia type ([address]  Seems to be progressing quite rapidly. Patient now on Risperdal which is helping with patient's mood. Continue this medication as well as Aricept and Remeron. Patient is getting help through palliative medicine. May consider hospice care if progression continues to decline rapidly  Return in about 4 weeks (around 10/19/2022) for swelling. SUBJECTIVE/OBJECTIVE:  HPI    Patient is a 45-year-old female with past medical history of hypertension, PAD, dementia and prediabetes who presents secondary to swelling in the lower extremities and some intermittent shortness of breath with getting dressed. Patient's daughter gives most of history secondary to patient's dementia. Patient's dementia seems to be progressing rapidly. Patient's daughter notes patient has up late at night now occasionally pulling things. She notes that she has noticed more OCD behavior like repetitive folding.   They do have palliative medicine coming into the house once a month and will have a nurse 8 hours a day in the near future. Patient is worried about progression quite quickly. She is interested in evaluation by hospice. Over the last couple of days patient has had bilateral ankle and foot swelling. She has also had some shortness of breath while getting dressed. Patient's daughter was concerned about this. They feel that the swelling does not improve overnight with feet being elevated. They also feel that she has not short of breath when laying flat. Review of Systems ROS negative except for those noted in the HPI above. Vitals:    09/21/22 1450   BP: 128/76   Pulse: 68   SpO2: 97%         Physical Exam  Constitutional:       Appearance: Normal appearance. HENT:      Head: Normocephalic and atraumatic. Right Ear: External ear normal.      Left Ear: External ear normal.   Eyes:      Extraocular Movements: Extraocular movements intact. Conjunctiva/sclera: Conjunctivae normal.   Cardiovascular:      Rate and Rhythm: Normal rate and regular rhythm. Heart sounds: No murmur heard. No friction rub. No gallop. Pulmonary:      Effort: Pulmonary effort is normal. No respiratory distress. Breath sounds: Normal breath sounds. No wheezing, rhonchi or rales. Abdominal:      General: Bowel sounds are normal. There is no distension. Palpations: Abdomen is soft. Tenderness: There is no abdominal tenderness. There is no guarding or rebound. Musculoskeletal:      Right lower leg: Edema (up to ankles +1-2 pitting) present. Left lower leg: Edema (up to ankles +1-2 pitting) present. Skin:     General: Skin is warm. Findings: No erythema or rash. Neurological:      General: No focal deficit present. Mental Status: She is alert and oriented to person, place, and time.    Psychiatric:         Mood and Affect: Mood normal.         Behavior: Behavior normal.         An electronic signature was used to authenticate this note.     --Verónica Bradley, DO

## 2022-09-22 LAB
ANION GAP SERPL CALCULATED.3IONS-SCNC: 9 MMOL/L (ref 3–16)
BUN BLDV-MCNC: 28 MG/DL (ref 7–20)
CALCIUM SERPL-MCNC: 9.2 MG/DL (ref 8.3–10.6)
CHLORIDE BLD-SCNC: 103 MMOL/L (ref 99–110)
CO2: 31 MMOL/L (ref 21–32)
CREAT SERPL-MCNC: 1 MG/DL (ref 0.6–1.2)
GFR AFRICAN AMERICAN: >60
GFR NON-AFRICAN AMERICAN: 53
GLUCOSE BLD-MCNC: 103 MG/DL (ref 70–99)
POTASSIUM SERPL-SCNC: 3.7 MMOL/L (ref 3.5–5.1)
SODIUM BLD-SCNC: 143 MMOL/L (ref 136–145)

## 2022-10-24 DIAGNOSIS — F03.90 DEMENTIA WITHOUT BEHAVIORAL DISTURBANCE (HCC): ICD-10-CM

## 2022-10-24 RX ORDER — DONEPEZIL HYDROCHLORIDE 5 MG/1
TABLET, FILM COATED ORAL
Qty: 90 TABLET | Refills: 0 | Status: SHIPPED | OUTPATIENT
Start: 2022-10-24

## 2022-10-24 RX ORDER — MIRTAZAPINE 15 MG/1
TABLET, FILM COATED ORAL
Qty: 90 TABLET | Refills: 0 | Status: SHIPPED | OUTPATIENT
Start: 2022-10-24

## 2022-10-26 ENCOUNTER — SCHEDULED TELEPHONE ENCOUNTER (OUTPATIENT)
Dept: INTERNAL MEDICINE CLINIC | Age: 82
End: 2022-10-26
Payer: MEDICARE

## 2022-10-26 DIAGNOSIS — N30.00 ACUTE CYSTITIS WITHOUT HEMATURIA: Primary | ICD-10-CM

## 2022-10-26 PROCEDURE — 99442 PR PHYS/QHP TELEPHONE EVALUATION 11-20 MIN: CPT | Performed by: INTERNAL MEDICINE

## 2022-10-26 RX ORDER — NITROFURANTOIN 25; 75 MG/1; MG/1
100 CAPSULE ORAL 2 TIMES DAILY
Qty: 10 CAPSULE | Refills: 0 | Status: SHIPPED | OUTPATIENT
Start: 2022-10-26 | End: 2022-10-31

## 2022-10-26 NOTE — PROGRESS NOTES
Telephone Visit  [facility]  Internal Medicine  [facility] DO DILIA      Margaret Mcdonald is a 81 y.o. female evaluated via telephone on 10/26/2022. Consent:  She and/or health care decision maker is aware that that she may receive a bill for this telephone service, depending on her insurance coverage, and has provided verbal consent to proceed: Yes      Documentation:  I communicated with the patient and/or health care decision maker about urinary symptoms/abdominal pain. Details of this discussion including any medical advice provided:     Discussed patient's symptoms with patient's daughter and POA secondary to patient's severe dementia. Patient has been having belly pain, worsening in mental status and overall feeling unwell over the last couple days. Does not have any burning with urination, but never does. Denies any fevers. She is having some lower abdominal pain. Similar symptoms with uti in the past.    Palliative medicine did not want to do a urine study. However, secondary to abdominal pain and similar symptoms with prior UTI I will go ahead and treat patient with Macrobid and have her obtain a urine study prior. If urine study comes back normal and culture does not grow anything we will stop antibiotic. Patient's daughter is okay with this plan they are to let me know if any symptoms worsen or do not improve    I affirm this is a Patient Initiated Episode with a Patient who has not had a related appointment within my department in the past 7 days or scheduled within the next 24 hours.     Patient identification was verified at the start of the visit: Yes (spoke with patients POA daughter Hayden Mark)    Total Time: minutes: 11-20 minutes    [facility] DO DILIA

## 2022-11-02 ENCOUNTER — OFFICE VISIT (OUTPATIENT)
Dept: INTERNAL MEDICINE CLINIC | Age: 82
End: 2022-11-02
Payer: MEDICARE

## 2022-11-02 VITALS
HEIGHT: 66 IN | OXYGEN SATURATION: 99 % | DIASTOLIC BLOOD PRESSURE: 72 MMHG | WEIGHT: 177.2 LBS | HEART RATE: 54 BPM | BODY MASS INDEX: 28.48 KG/M2 | SYSTOLIC BLOOD PRESSURE: 132 MMHG

## 2022-11-02 DIAGNOSIS — I10 ESSENTIAL HYPERTENSION: ICD-10-CM

## 2022-11-02 DIAGNOSIS — F02.C11 SEVERE LATE ONSET ALZHEIMER'S DEMENTIA WITH AGITATION (HCC): Primary | ICD-10-CM

## 2022-11-02 DIAGNOSIS — G30.1 SEVERE LATE ONSET ALZHEIMER'S DEMENTIA WITH AGITATION (HCC): Primary | ICD-10-CM

## 2022-11-02 DIAGNOSIS — E78.2 MIXED HYPERLIPIDEMIA: ICD-10-CM

## 2022-11-02 PROCEDURE — 3074F SYST BP LT 130 MM HG: CPT | Performed by: INTERNAL MEDICINE

## 2022-11-02 PROCEDURE — 3078F DIAST BP <80 MM HG: CPT | Performed by: INTERNAL MEDICINE

## 2022-11-02 PROCEDURE — G8428 CUR MEDS NOT DOCUMENT: HCPCS | Performed by: INTERNAL MEDICINE

## 2022-11-02 PROCEDURE — G8417 CALC BMI ABV UP PARAM F/U: HCPCS | Performed by: INTERNAL MEDICINE

## 2022-11-02 PROCEDURE — G8400 PT W/DXA NO RESULTS DOC: HCPCS | Performed by: INTERNAL MEDICINE

## 2022-11-02 PROCEDURE — G8484 FLU IMMUNIZE NO ADMIN: HCPCS | Performed by: INTERNAL MEDICINE

## 2022-11-02 PROCEDURE — 1036F TOBACCO NON-USER: CPT | Performed by: INTERNAL MEDICINE

## 2022-11-02 PROCEDURE — 1123F ACP DISCUSS/DSCN MKR DOCD: CPT | Performed by: INTERNAL MEDICINE

## 2022-11-02 PROCEDURE — 1090F PRES/ABSN URINE INCON ASSESS: CPT | Performed by: INTERNAL MEDICINE

## 2022-11-02 PROCEDURE — 99214 OFFICE O/P EST MOD 30 MIN: CPT | Performed by: INTERNAL MEDICINE

## 2022-11-02 RX ORDER — QUETIAPINE FUMARATE 25 MG/1
50 TABLET, FILM COATED ORAL NIGHTLY
COMMUNITY
Start: 2022-10-12

## 2022-11-02 NOTE — PROGRESS NOTES
Zachary Munson (:  1940) is a 81 y.o. female,Established patient, here for evaluation of the following chief complaint(s):  Follow-up    ASSESSMENT/PLAN:  1. 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 Patient now managed by palliative medicine. Patient is also being evaluated for hospice frequently. Daughter has decided to manage things at home with palliative medicine and plan will not be to come into the office again unless they need something acutely to help keep patient comfortable. This decision has been made by patient and family together with me as well. 2. Essential hypertension  Well-controlled  3. Mixed hyperlipidemia  Atorvastatin discontinued by palliative medicine      SUBJECTIVE/OBJECTIVE:  HPI    Patient is an 52-year-old female with past medical history of significant dementia, hypertension, PAD, recurrent UTI who presents for follow-up for the above listed chronic diseases. Patient's mental status is declining per patient's daughter who is in the room giving most of patient's history. Patient is now sometimes unable to recognize her daughter in addition is not able to always say that that she has someone she lives with. Palliative medicine has been coming into the home and helping with medication management. Lipitor was stopped. Patient was started on Seroquel. Risperidone has also been stopped. Patient notes she is feeling well today she denies any pain. Patient's daughter feels that she is doing okay at the time. Concerns about recurrent UTI. Palliative nurse would like to start her on a prophylactic medication for this. Okay to start this medication. Patient will be transitioning to management by the palliative doctors. She has had evaluation for hospice and they will continue to evaluate until she meets this. Review of Systems ROS negative except for those noted in the HPI above.        Vitals: 11/02/22 1320   BP: 132/72   Pulse: 54   SpO2: 99%         Physical Exam  Constitutional:       General: She is not in acute distress. Appearance: Normal appearance. She is not ill-appearing. HENT:      Head: Normocephalic and atraumatic. Right Ear: External ear normal.      Left Ear: External ear normal.   Eyes:      General: No scleral icterus. Extraocular Movements: Extraocular movements intact. Conjunctiva/sclera: Conjunctivae normal.   Cardiovascular:      Rate and Rhythm: Normal rate and regular rhythm. Heart sounds: No murmur heard. No friction rub. No gallop. Pulmonary:      Effort: Pulmonary effort is normal. No respiratory distress. Breath sounds: Normal breath sounds. No wheezing, rhonchi or rales. Abdominal:      General: Bowel sounds are normal. There is no distension. Palpations: Abdomen is soft. There is no mass. Tenderness: There is no abdominal tenderness. There is no guarding or rebound. Musculoskeletal:      Cervical back: Normal range of motion. No muscular tenderness. Right lower leg: No edema. Left lower leg: No edema. Skin:     General: Skin is warm. Findings: No erythema or rash. Neurological:      General: No focal deficit present. Mental Status: She is alert and oriented to person, place, and time. Psychiatric:         Mood and Affect: Mood normal.         Behavior: Behavior normal.         An electronic signature was used to authenticate this note.     --Kulwant Barrett DO

## 2022-11-03 PROBLEM — G30.1 SEVERE LATE ONSET ALZHEIMER'S DEMENTIA WITH AGITATION (HCC): Status: ACTIVE | Noted: 2022-05-02

## 2022-11-03 PROBLEM — F02.C11 SEVERE LATE ONSET ALZHEIMER'S DEMENTIA WITH AGITATION (HCC): Status: ACTIVE | Noted: 2022-05-02

## 2022-12-08 DIAGNOSIS — R26.81 DIFFICULTY STANDING: Primary | ICD-10-CM

## 2022-12-08 DIAGNOSIS — R26.2 DIFFICULTY WALKING: ICD-10-CM

## 2023-01-20 DIAGNOSIS — F03.90 DEMENTIA WITHOUT BEHAVIORAL DISTURBANCE (HCC): ICD-10-CM

## 2023-01-20 RX ORDER — MIRTAZAPINE 15 MG/1
TABLET, FILM COATED ORAL
Qty: 90 TABLET | Refills: 0 | Status: SHIPPED | OUTPATIENT
Start: 2023-01-20

## 2023-01-20 RX ORDER — DONEPEZIL HYDROCHLORIDE 5 MG/1
TABLET, FILM COATED ORAL
Qty: 90 TABLET | Refills: 0 | Status: SHIPPED | OUTPATIENT
Start: 2023-01-20

## 2025-01-29 NOTE — ED PROVIDER NOTES
[facility] ENCOUNTER          PHYSICIAN ASSISTANT NOTE       Date of evaluation: 12/31/2021    Chief Complaint     Fall (right hip pain)      History of Present Illness     Cruzito Avery is a [de-identified] y.o. female, with a history of anxiety, chronic kidney disease, dementia, hyperlipidemia, hypertension, prediabetes and peripheral arterial disease. Patient presents via squad with reports of right hip pain following a fall. The patient is an unreliable historian. She denies pain to me however is noted to wince with passive movement of the right hip. She has evidence of contusion to the left forehead. The patient lives with her daughter Lucas Bedoya and her son-in-law at home. I did speak with her daughter later in the patient's visit, she can be reached at [phone]. She states she and her  both have Covid and the patient also tested positive today after having developed symptoms yesterday. The patient was prescribed a Z-Marty and she has taken her first dose. Her daughter states the patient was reporting intermittent lightheadedness earlier today. She had gotten up to go to the bathroom this evening, fell forward when sitting on the toilet and struck her head, ultimately landing on her right hip. She was unable to bear weight prompting her evaluation. She typically walks with a walker. She has had a low-grade fever at home, likely related to the Covid infection. She has otherwise been in her usual state of health and was at her baseline mental status following the fall. Review of Systems     Review of Systems   Unable to perform ROS: Dementia   Constitutional: Positive for fever (+ Covid). Musculoskeletal: Positive for arthralgias (right hip) and gait problem (fall).        Past Medical, Surgical, Family, and Social History     She has a past medical history of Anxiety, Chronic renal insufficiency, COVID-19 virus infection, Dementia ([address] Hyperlipidemia, Hypertension, Peripheral arterial disease ([address] and Prediabetes. She has no past surgical history on file. Her family history is not on file. She reports that she has never smoked. She has never used smokeless tobacco. She reports that she does not drink alcohol and does not use drugs. Medications     Previous Medications    ASPIRIN 81 MG EC TABLET    Take 81 mg by mouth nightly     ATORVASTATIN (LIPITOR) 40 MG TABLET    Take 1 tablet by mouth daily    AZITHROMYCIN (ZITHROMAX) 250 MG TABLET    Take as directed    DONEPEZIL (ARICEPT) 5 MG TABLET    TAKE 1 TABLET BY MOUTH EVERY DAY AT NIGHT    KETOCONAZOLE (NIZORAL) 2 % CREAM    Apply topically daily. MIRTAZAPINE (REMERON) 15 MG TABLET    TAKE 1 TABLET BY MOUTH EVERY DAY AT NIGHT       Allergies     She is allergic to clarithromycin and sulfamethoxazole-trimethoprim. Physical Exam     INITIAL VITALS: BP: 124/75, Temp: 97.3 °F (36.3 °C), Pulse: 75, Resp: 16, SpO2: 94 %  Physical Exam  Vitals reviewed. Constitutional:       General: She is not in acute distress. Appearance: She is well-developed and normal weight. HENT:      Head: Normocephalic. Contusion (left forehead) present. Mouth/Throat:      Mouth: Mucous membranes are dry. Eyes:      Extraocular Movements: Extraocular movements intact. Conjunctiva/sclera: Conjunctivae normal.   Neck:      Trachea: Phonation normal.   Cardiovascular:      Rate and Rhythm: Tachycardia present. Rhythm irregularly irregular. Pulses: Normal pulses. Heart sounds: No murmur heard. No friction rub. No gallop. Pulmonary:      Effort: Pulmonary effort is normal. No respiratory distress. Breath sounds: No wheezing, rhonchi or rales. Abdominal:      General: There is no distension. Palpations: Abdomen is soft. Tenderness: There is no abdominal tenderness. Musculoskeletal:      Cervical back: Normal range of motion and neck supple.  No spinous process tenderness or muscular tenderness. Right hip: No deformity, bony tenderness or crepitus. Decreased range of motion. Right lower leg: No edema. Left lower leg: No edema. Comments: No TLS midline tenderness. The extremities are otherwise unremarkable for evidence of trauma in the form of long bone deformity, joint effusion or tenderness to palpation. She has good passive range of motion in the remaining joints without pain. Skin:     General: Skin is warm and dry. Findings: Rash (Moist erythematous rash in the bilateral groin folds consistent with cutaneous candidiasis. ) present. No petechiae. Neurological:      Mental Status: She is alert. She is confused. Cranial Nerves: Cranial nerves are intact. Motor: Motor function is intact. Comments: Oriented to self and place. Psychiatric:         Mood and Affect: Mood and affect normal.         Behavior: Behavior normal.         Diagnostic Results     EKG   Interpreted in conjunction with emergency department physician Doc Rivera MD  Rhythm: atrial fibrillation - rapid fluctuating with NSR  Rate: 100-110  Axis: normal  Ectopy: none  Conduction: normal  ST Segments: no acute change  T Waves: no acute change  Q Waves:nonspecific  Clinical Impression: atrial fibrillation (new onset)  Comparison:  11/17/2021    RECENT VITALS:  BP: (!) 154/133, Temp: 97.3 °F (36.3 °C), Pulse: 153, Resp: 16, SpO2: 99 %     ED Course     Nursing Notes, Past Medical Hx,Past Surgical Hx, Social Hx, Allergies, and Family Hx were reviewed. The patient was given the following medications:  Orders Placed This Encounter   Medications    acetaminophen (TYLENOL) tablet 650 mg    0.9 % sodium chloride bolus       CONSULTS:  None    MEDICAL DECISION MAKING / ASSESSMENT / Marilyn Center is a [de-identified] y.o. female with known Covid infection presenting via squad after having fallen this evening.   Her daughter reports she had been sitting on the toilet when she fell forward, there was no loss of consciousness. She did strike her head and landed on her right hip, presented with right hip pain and unable to bear weight with a positive logroll on exam.  The patient is otherwise well-appearing, vital signs are stable with no other apparent injuries, no neurologic deficits. IV access was established. She was given Tylenol for pain. The patient was noted to have an irregular heartbeat, EKG confirmed atrial fibrillation alternating with normal sinus rhythm. She has had no history of A. fib. Labs included unremarkable CBC and troponin. Renal panel results a potassium of 3.4, glucose 132, it is otherwise unremarkable. Magnesium is normal.  Coags are unremarkable. Chest x-ray shows no acute cardiopulmonary abnormality. 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 Head CT, cervical spine CT and right hip CT are all pending. Urinalysis is also pending. The patient will be turned over to Dr. Bernardo Rod at this time pending additional diagnostic studies and final disposition. She will likely be admitted to the hospital given her new onset A. fib in the setting of acute Covid virus infection. Ortho will be contacted if her hip CT shows a fracture, if not, the patient would benefit from PT/OT assessment. The patient is in stable condition awaiting disposition. This patient was also evaluated by the attending physician. All care plans were discussed and agreed upon. Clinical Impression     1. New onset atrial fibrillation ([address]    2. COVID-19 virus infection    3. Forehead contusion, initial encounter    4. Contusion of right hip, initial encounter    5. Fall in home, initial encounter        Disposition     PATIENT REFERRED TO:  No follow-up provider specified.     DISCHARGE MEDICATIONS:  New Prescriptions    No medications on file       DISPOSITION  Pending     Melvina Garcia  12/31/21 [id number]